# Patient Record
Sex: FEMALE | Race: WHITE | NOT HISPANIC OR LATINO
[De-identification: names, ages, dates, MRNs, and addresses within clinical notes are randomized per-mention and may not be internally consistent; named-entity substitution may affect disease eponyms.]

---

## 2017-01-04 ENCOUNTER — APPOINTMENT (OUTPATIENT)
Dept: COLORECTAL SURGERY | Facility: CLINIC | Age: 62
End: 2017-01-04

## 2017-01-09 ENCOUNTER — OTHER (OUTPATIENT)
Age: 62
End: 2017-01-09

## 2017-01-11 ENCOUNTER — APPOINTMENT (OUTPATIENT)
Dept: COLORECTAL SURGERY | Facility: CLINIC | Age: 62
End: 2017-01-11

## 2017-01-11 VITALS
DIASTOLIC BLOOD PRESSURE: 81 MMHG | SYSTOLIC BLOOD PRESSURE: 123 MMHG | BODY MASS INDEX: 25.52 KG/M2 | HEIGHT: 63 IN | WEIGHT: 144 LBS | TEMPERATURE: 97.5 F | HEART RATE: 84 BPM | RESPIRATION RATE: 14 BRPM

## 2017-01-24 ENCOUNTER — APPOINTMENT (OUTPATIENT)
Dept: FAMILY MEDICINE | Facility: CLINIC | Age: 62
End: 2017-01-24

## 2017-02-08 ENCOUNTER — APPOINTMENT (OUTPATIENT)
Dept: COLORECTAL SURGERY | Facility: CLINIC | Age: 62
End: 2017-02-08

## 2017-03-10 ENCOUNTER — APPOINTMENT (OUTPATIENT)
Dept: FAMILY MEDICINE | Facility: CLINIC | Age: 62
End: 2017-03-10

## 2017-03-10 VITALS
WEIGHT: 159 LBS | SYSTOLIC BLOOD PRESSURE: 125 MMHG | BODY MASS INDEX: 28.17 KG/M2 | HEART RATE: 70 BPM | DIASTOLIC BLOOD PRESSURE: 82 MMHG | HEIGHT: 63 IN

## 2017-03-10 DIAGNOSIS — K64.8 RESIDUAL HEMORRHOIDAL SKIN TAGS: ICD-10-CM

## 2017-03-10 DIAGNOSIS — K64.4 RESIDUAL HEMORRHOIDAL SKIN TAGS: ICD-10-CM

## 2017-06-20 ENCOUNTER — APPOINTMENT (OUTPATIENT)
Dept: FAMILY MEDICINE | Facility: CLINIC | Age: 62
End: 2017-06-20

## 2017-06-20 VITALS
WEIGHT: 149 LBS | BODY MASS INDEX: 26.4 KG/M2 | HEART RATE: 65 BPM | DIASTOLIC BLOOD PRESSURE: 70 MMHG | SYSTOLIC BLOOD PRESSURE: 120 MMHG | OXYGEN SATURATION: 98 % | HEIGHT: 63 IN

## 2017-06-20 DIAGNOSIS — H92.02 OTALGIA, LEFT EAR: ICD-10-CM

## 2017-06-22 LAB — BACTERIA UR CULT: NORMAL

## 2017-07-26 LAB
BILIRUB UR QL STRIP: NEGATIVE
GLUCOSE UR-MCNC: NEGATIVE
HCG UR QL: 0.2 EU/DL
HGB UR QL STRIP.AUTO: NORMAL
KETONES UR-MCNC: NEGATIVE
LEUKOCYTE ESTERASE UR QL STRIP: NORMAL
NITRITE UR QL STRIP: NEGATIVE
PH UR STRIP: 7
PROT UR STRIP-MCNC: NEGATIVE
SP GR UR STRIP: 1.02

## 2017-08-25 ENCOUNTER — OTHER (OUTPATIENT)
Age: 62
End: 2017-08-25

## 2017-09-12 ENCOUNTER — APPOINTMENT (OUTPATIENT)
Dept: FAMILY MEDICINE | Facility: CLINIC | Age: 62
End: 2017-09-12
Payer: MEDICARE

## 2017-09-12 VITALS
TEMPERATURE: 98.2 F | OXYGEN SATURATION: 99 % | WEIGHT: 145 LBS | HEIGHT: 63 IN | HEART RATE: 58 BPM | SYSTOLIC BLOOD PRESSURE: 120 MMHG | DIASTOLIC BLOOD PRESSURE: 70 MMHG | BODY MASS INDEX: 25.69 KG/M2

## 2017-09-12 PROCEDURE — 99214 OFFICE O/P EST MOD 30 MIN: CPT

## 2017-09-12 RX ORDER — CIPROFLOXACIN HYDROCHLORIDE 500 MG/1
500 TABLET, FILM COATED ORAL
Qty: 20 | Refills: 0 | Status: DISCONTINUED | COMMUNITY
Start: 2017-06-20 | End: 2017-09-12

## 2017-09-13 ENCOUNTER — MEDICATION RENEWAL (OUTPATIENT)
Age: 62
End: 2017-09-13

## 2017-09-15 ENCOUNTER — MEDICATION RENEWAL (OUTPATIENT)
Age: 62
End: 2017-09-15

## 2017-09-27 ENCOUNTER — MEDICATION RENEWAL (OUTPATIENT)
Age: 62
End: 2017-09-27

## 2017-11-06 ENCOUNTER — MEDICATION RENEWAL (OUTPATIENT)
Age: 62
End: 2017-11-06

## 2017-11-07 ENCOUNTER — RESULT REVIEW (OUTPATIENT)
Age: 62
End: 2017-11-07

## 2017-11-08 ENCOUNTER — OTHER (OUTPATIENT)
Age: 62
End: 2017-11-08

## 2017-11-15 ENCOUNTER — MEDICATION RENEWAL (OUTPATIENT)
Age: 62
End: 2017-11-15

## 2017-11-28 ENCOUNTER — APPOINTMENT (OUTPATIENT)
Dept: FAMILY MEDICINE | Facility: CLINIC | Age: 62
End: 2017-11-28
Payer: MEDICARE

## 2017-11-28 VITALS
SYSTOLIC BLOOD PRESSURE: 114 MMHG | WEIGHT: 146 LBS | HEIGHT: 63 IN | TEMPERATURE: 97.8 F | BODY MASS INDEX: 25.87 KG/M2 | OXYGEN SATURATION: 98 % | DIASTOLIC BLOOD PRESSURE: 70 MMHG | HEART RATE: 61 BPM

## 2017-11-28 PROCEDURE — 99214 OFFICE O/P EST MOD 30 MIN: CPT

## 2017-11-28 RX ORDER — AZITHROMYCIN 250 MG/1
250 TABLET, FILM COATED ORAL
Qty: 1 | Refills: 0 | Status: DISCONTINUED | COMMUNITY
Start: 2017-09-15 | End: 2017-11-28

## 2017-12-05 ENCOUNTER — MEDICATION RENEWAL (OUTPATIENT)
Age: 62
End: 2017-12-05

## 2018-01-19 ENCOUNTER — APPOINTMENT (OUTPATIENT)
Dept: FAMILY MEDICINE | Facility: CLINIC | Age: 63
End: 2018-01-19
Payer: MEDICARE

## 2018-01-19 VITALS
DIASTOLIC BLOOD PRESSURE: 70 MMHG | HEART RATE: 80 BPM | HEIGHT: 63 IN | WEIGHT: 148 LBS | BODY MASS INDEX: 26.22 KG/M2 | OXYGEN SATURATION: 98 % | SYSTOLIC BLOOD PRESSURE: 118 MMHG

## 2018-01-19 PROCEDURE — 99214 OFFICE O/P EST MOD 30 MIN: CPT | Mod: 25

## 2018-01-19 PROCEDURE — 90688 IIV4 VACCINE SPLT 0.5 ML IM: CPT

## 2018-01-19 PROCEDURE — G0008: CPT

## 2018-01-19 RX ORDER — AZITHROMYCIN 250 MG/1
250 TABLET, FILM COATED ORAL
Qty: 1 | Refills: 0 | Status: DISCONTINUED | COMMUNITY
Start: 2017-11-28 | End: 2018-01-19

## 2018-02-14 ENCOUNTER — TRANSCRIPTION ENCOUNTER (OUTPATIENT)
Age: 63
End: 2018-02-14

## 2018-04-10 ENCOUNTER — APPOINTMENT (OUTPATIENT)
Dept: FAMILY MEDICINE | Facility: CLINIC | Age: 63
End: 2018-04-10

## 2018-04-30 ENCOUNTER — MEDICATION RENEWAL (OUTPATIENT)
Age: 63
End: 2018-04-30

## 2018-05-15 ENCOUNTER — APPOINTMENT (OUTPATIENT)
Dept: FAMILY MEDICINE | Facility: CLINIC | Age: 63
End: 2018-05-15

## 2018-06-12 ENCOUNTER — APPOINTMENT (OUTPATIENT)
Dept: FAMILY MEDICINE | Facility: CLINIC | Age: 63
End: 2018-06-12
Payer: MEDICARE

## 2018-06-12 VITALS
HEART RATE: 72 BPM | OXYGEN SATURATION: 98 % | DIASTOLIC BLOOD PRESSURE: 70 MMHG | WEIGHT: 146 LBS | SYSTOLIC BLOOD PRESSURE: 116 MMHG | BODY MASS INDEX: 25.87 KG/M2 | HEIGHT: 63 IN

## 2018-06-12 DIAGNOSIS — Z86.69 PERSONAL HISTORY OF OTHER DISEASES OF THE NERVOUS SYSTEM AND SENSE ORGANS: ICD-10-CM

## 2018-06-12 PROCEDURE — 99214 OFFICE O/P EST MOD 30 MIN: CPT

## 2018-06-12 RX ORDER — ALBUTEROL SULFATE 90 UG/1
108 (90 BASE) AEROSOL, METERED RESPIRATORY (INHALATION)
Qty: 1 | Refills: 1 | Status: COMPLETED | COMMUNITY
Start: 2018-01-19 | End: 2018-06-12

## 2018-06-12 RX ORDER — MESALAMINE 1000 MG/1
1000 SUPPOSITORY RECTAL
Qty: 30 | Refills: 3 | Status: COMPLETED | COMMUNITY
Start: 2017-03-10 | End: 2018-06-12

## 2018-06-12 NOTE — PHYSICAL EXAM
[No Acute Distress] : no acute distress [Well Nourished] : well nourished [Well Developed] : well developed [Well-Appearing] : well-appearing [EOMI] : extraocular movements intact [Normal Oropharynx] : the oropharynx was normal [No JVD] : no jugular venous distention [Supple] : supple [No Lymphadenopathy] : no lymphadenopathy [No Respiratory Distress] : no respiratory distress  [Clear to Auscultation] : lungs were clear to auscultation bilaterally [No Accessory Muscle Use] : no accessory muscle use [Normal Rate] : normal rate  [Regular Rhythm] : with a regular rhythm [Normal S1, S2] : normal S1 and S2 [No Edema] : there was no peripheral edema [Soft] : abdomen soft [Non Tender] : non-tender [Non-distended] : non-distended [No HSM] : no HSM [No CVA Tenderness] : no CVA  tenderness [Grossly Normal Strength/Tone] : grossly normal strength/tone [No Rash] : no rash [Normal Gait] : normal gait [Coordination Grossly Intact] : coordination grossly intact [No Focal Deficits] : no focal deficits [Normal Affect] : the affect was normal [Normal Mood] : the mood was normal [Normal Insight/Judgement] : insight and judgment were intact

## 2018-06-12 NOTE — HISTORY OF PRESENT ILLNESS
[FreeTextEntry1] : follow up on Vit D def/Hypothyroidism  [de-identified] : 61 yo female presents to office presents to office for 3M checkup on Vit D def/Asthma/Migraine H/A  and Hypothyroidism.   pt feels well today no new complaints.  \par \par \par no CP/SOB at rest   +SOB c > 2 flights of stairs no dizziness palpitations no N/V/D +BM qd NL no bloody/black stools \par no urinary complaints \par \par

## 2018-06-29 ENCOUNTER — APPOINTMENT (OUTPATIENT)
Dept: FAMILY MEDICINE | Facility: CLINIC | Age: 63
End: 2018-06-29
Payer: MEDICARE

## 2018-06-29 VITALS
BODY MASS INDEX: 26.5 KG/M2 | TEMPERATURE: 98.1 F | DIASTOLIC BLOOD PRESSURE: 74 MMHG | WEIGHT: 144 LBS | SYSTOLIC BLOOD PRESSURE: 114 MMHG | HEIGHT: 62 IN | OXYGEN SATURATION: 98 % | HEART RATE: 91 BPM

## 2018-06-29 DIAGNOSIS — J18.0 BRONCHOPNEUMONIA, UNSPECIFIED ORGANISM: ICD-10-CM

## 2018-06-29 PROCEDURE — 99214 OFFICE O/P EST MOD 30 MIN: CPT

## 2018-06-29 RX ORDER — PREDNISONE 50 MG/1
50 TABLET ORAL
Qty: 5 | Refills: 0 | Status: COMPLETED | COMMUNITY
Start: 2018-02-14

## 2018-06-29 NOTE — REVIEW OF SYSTEMS
[Fatigue] : fatigue [Negative] : Heme/Lymph [FreeTextEntry2] : see HPI  [FreeTextEntry4] : see HPI  [FreeTextEntry6] : see HPI

## 2018-06-29 NOTE — PHYSICAL EXAM
[No Acute Distress] : no acute distress [Well Nourished] : well nourished [Well Developed] : well developed [EOMI] : extraocular movements intact [No JVD] : no jugular venous distention [Supple] : supple [No Lymphadenopathy] : no lymphadenopathy [Normal Rate] : normal rate  [Regular Rhythm] : with a regular rhythm [Normal S1, S2] : normal S1 and S2 [No Edema] : there was no peripheral edema [No CVA Tenderness] : no CVA  tenderness [Grossly Normal Strength/Tone] : grossly normal strength/tone [No Rash] : no rash [Normal Gait] : normal gait [Coordination Grossly Intact] : coordination grossly intact [No Focal Deficits] : no focal deficits [Normal Affect] : the affect was normal [Normal Mood] : the mood was normal [Normal Insight/Judgement] : insight and judgment were intact [de-identified] : +PND post pharynx  [de-identified] : +wheezes/rales RUL/RML

## 2018-06-29 NOTE — ASSESSMENT
[FreeTextEntry1] : pt states unable to tolerate oral corticosteroids secondary to trigger of migraine  \par \par cont Singulair 10mg hs \par Stressed need for q6-8h abuterol neb tx's \par \par see med orders \par \par see STAT radiology orders \par \par \par

## 2018-06-29 NOTE — HISTORY OF PRESENT ILLNESS
[FreeTextEntry8] : cough/congestion/body aches x2wks\par \par \par 2 week hx of loose cough, non-productive, +nasal congestion c clear rhinorrhea  no fever no chills +sweats +body aches +fatigue \par no sore throat no ear pain B/L \par \par self medicated c Cipro 500mg QD x 6 days  (pt states felt an improvement at that time approx "60%") that she completed 1 week ago \par \par

## 2018-07-13 ENCOUNTER — APPOINTMENT (OUTPATIENT)
Dept: FAMILY MEDICINE | Facility: CLINIC | Age: 63
End: 2018-07-13

## 2018-07-24 ENCOUNTER — MEDICATION RENEWAL (OUTPATIENT)
Age: 63
End: 2018-07-24

## 2018-08-07 ENCOUNTER — MEDICATION RENEWAL (OUTPATIENT)
Age: 63
End: 2018-08-07

## 2018-08-07 ENCOUNTER — APPOINTMENT (OUTPATIENT)
Dept: FAMILY MEDICINE | Facility: CLINIC | Age: 63
End: 2018-08-07
Payer: MEDICARE

## 2018-08-07 VITALS
SYSTOLIC BLOOD PRESSURE: 116 MMHG | BODY MASS INDEX: 26.13 KG/M2 | OXYGEN SATURATION: 98 % | WEIGHT: 142 LBS | DIASTOLIC BLOOD PRESSURE: 78 MMHG | HEIGHT: 62 IN | HEART RATE: 84 BPM

## 2018-08-07 DIAGNOSIS — T75.3XXA MOTION SICKNESS, INITIAL ENCOUNTER: ICD-10-CM

## 2018-08-07 PROCEDURE — 99214 OFFICE O/P EST MOD 30 MIN: CPT

## 2018-08-07 RX ORDER — CIPROFLOXACIN HYDROCHLORIDE 500 MG/1
500 TABLET, FILM COATED ORAL
Qty: 20 | Refills: 0 | Status: COMPLETED | COMMUNITY
Start: 2018-06-29 | End: 2018-08-07

## 2018-08-07 NOTE — PHYSICAL EXAM
[No Acute Distress] : no acute distress [Well Nourished] : well nourished [Well Developed] : well developed [EOMI] : extraocular movements intact [Normal Outer Ear/Nose] : the outer ears and nose were normal in appearance [Normal Oropharynx] : the oropharynx was normal [No JVD] : no jugular venous distention [Supple] : supple [No Lymphadenopathy] : no lymphadenopathy [No Respiratory Distress] : no respiratory distress  [Clear to Auscultation] : lungs were clear to auscultation bilaterally [No Accessory Muscle Use] : no accessory muscle use [Normal Rate] : normal rate  [Regular Rhythm] : with a regular rhythm [Normal S1, S2] : normal S1 and S2 [No Edema] : there was no peripheral edema [Soft] : abdomen soft [Non Tender] : non-tender [Non-distended] : non-distended [No HSM] : no HSM [Normal Posterior Cervical Nodes] : no posterior cervical lymphadenopathy [Normal Anterior Cervical Nodes] : no anterior cervical lymphadenopathy [No CVA Tenderness] : no CVA  tenderness [Grossly Normal Strength/Tone] : grossly normal strength/tone [No Rash] : no rash [Normal Gait] : normal gait [Coordination Grossly Intact] : coordination grossly intact [No Focal Deficits] : no focal deficits [Normal Affect] : the affect was normal [Normal Mood] : the mood was normal [Normal Insight/Judgement] : insight and judgment were intact

## 2018-08-07 NOTE — HISTORY OF PRESENT ILLNESS
[FreeTextEntry1] : Patient here to discuss medication  [de-identified] : 63 yo female scheduled to go on 7 day cruise on 8/18/18.  pt states has history of motion sickness, requests Sopalamine patch. \par pt feels well today no current complaints

## 2018-10-16 ENCOUNTER — APPOINTMENT (OUTPATIENT)
Dept: FAMILY MEDICINE | Facility: CLINIC | Age: 63
End: 2018-10-16

## 2018-11-02 ENCOUNTER — APPOINTMENT (OUTPATIENT)
Dept: FAMILY MEDICINE | Facility: CLINIC | Age: 63
End: 2018-11-02
Payer: MEDICARE

## 2018-11-02 VITALS
HEIGHT: 62 IN | OXYGEN SATURATION: 99 % | SYSTOLIC BLOOD PRESSURE: 118 MMHG | DIASTOLIC BLOOD PRESSURE: 72 MMHG | HEART RATE: 77 BPM | TEMPERATURE: 98.1 F | WEIGHT: 146 LBS | BODY MASS INDEX: 26.87 KG/M2

## 2018-11-02 DIAGNOSIS — R42 DIZZINESS AND GIDDINESS: ICD-10-CM

## 2018-11-02 DIAGNOSIS — H53.8 OTHER VISUAL DISTURBANCES: ICD-10-CM

## 2018-11-02 PROCEDURE — 90686 IIV4 VACC NO PRSV 0.5 ML IM: CPT

## 2018-11-02 PROCEDURE — G0008: CPT

## 2018-11-02 PROCEDURE — 99214 OFFICE O/P EST MOD 30 MIN: CPT | Mod: 25

## 2018-11-02 RX ORDER — CIPROFLOXACIN HYDROCHLORIDE 500 MG/1
500 TABLET, FILM COATED ORAL TWICE DAILY
Qty: 20 | Refills: 0 | Status: DISCONTINUED | COMMUNITY
Start: 2018-08-07 | End: 2018-11-02

## 2018-11-02 NOTE — PHYSICAL EXAM
[No Acute Distress] : no acute distress [Well Nourished] : well nourished [Well Developed] : well developed [Well-Appearing] : well-appearing [EOMI] : extraocular movements intact [Normal Outer Ear/Nose] : the outer ears and nose were normal in appearance [Supple] : supple [No Respiratory Distress] : no respiratory distress  [Clear to Auscultation] : lungs were clear to auscultation bilaterally [No Accessory Muscle Use] : no accessory muscle use [Normal Rate] : normal rate  [Regular Rhythm] : with a regular rhythm [Normal S1, S2] : normal S1 and S2 [No Edema] : there was no peripheral edema [Non Tender] : non-tender [Normal Posterior Cervical Nodes] : no posterior cervical lymphadenopathy [Normal Anterior Cervical Nodes] : no anterior cervical lymphadenopathy [No CVA Tenderness] : no CVA  tenderness [Grossly Normal Strength/Tone] : grossly normal strength/tone [No Rash] : no rash [Normal Gait] : normal gait [Coordination Grossly Intact] : coordination grossly intact [No Focal Deficits] : no focal deficits [Normal Affect] : the affect was normal [Normal Mood] : the mood was normal [Normal Insight/Judgement] : insight and judgment were intact

## 2018-11-02 NOTE — HISTORY OF PRESENT ILLNESS
[FreeTextEntry1] : follow up on anxiety/hypothyroidism/vit d def [de-identified] : 62 yo female presents to office for f/u on KATHERINE/Hypothyroidism/Vit D def.    pt reports CP c increased anxiety only,   reports increased migraine frequency "atleast 1x/week"  +blurred vision associated c H/A only +dizziness associated c H/A only,  increased frequency of vertigo like symptoms \par \par no change in bowel habits no change in urinary habits

## 2018-11-07 ENCOUNTER — MEDICATION RENEWAL (OUTPATIENT)
Age: 63
End: 2018-11-07

## 2018-11-16 ENCOUNTER — TRANSCRIPTION ENCOUNTER (OUTPATIENT)
Age: 63
End: 2018-11-16

## 2019-01-31 ENCOUNTER — RESULT CHARGE (OUTPATIENT)
Age: 64
End: 2019-01-31

## 2019-02-01 ENCOUNTER — NON-APPOINTMENT (OUTPATIENT)
Age: 64
End: 2019-02-01

## 2019-02-01 ENCOUNTER — APPOINTMENT (OUTPATIENT)
Dept: FAMILY MEDICINE | Facility: CLINIC | Age: 64
End: 2019-02-01
Payer: MEDICARE

## 2019-02-01 VITALS
WEIGHT: 148 LBS | HEIGHT: 62 IN | DIASTOLIC BLOOD PRESSURE: 80 MMHG | OXYGEN SATURATION: 80 % | HEART RATE: 78 BPM | BODY MASS INDEX: 27.23 KG/M2 | SYSTOLIC BLOOD PRESSURE: 122 MMHG

## 2019-02-01 DIAGNOSIS — Z00.00 ENCOUNTER FOR GENERAL ADULT MEDICAL EXAMINATION W/OUT ABNORMAL FINDINGS: ICD-10-CM

## 2019-02-01 DIAGNOSIS — K63.5 POLYP OF COLON: ICD-10-CM

## 2019-02-01 PROCEDURE — G0402 INITIAL PREVENTIVE EXAM: CPT

## 2019-02-01 PROCEDURE — G0403: CPT

## 2019-02-01 RX ORDER — SCOPOLAMINE 1.5 MG/1
1 PATCH, EXTENDED RELEASE TRANSDERMAL
Qty: 1 | Refills: 3 | Status: COMPLETED | COMMUNITY
Start: 2018-08-07 | End: 2019-02-01

## 2019-02-01 NOTE — HEALTH RISK ASSESSMENT
[Patient reported colonoscopy was abnormal] : Patient reported colonoscopy was abnormal [With Family] : lives with family [# of Members in Household ___] :  household currently consist of [unfilled] member(s) [On disability] : on disability [High School] : high school [] :  [# Of Children ___] : has [unfilled] children [Feels Safe at Home] : Feels safe at home [Fully functional (bathing, dressing, toileting, transferring, walking, feeding)] : Fully functional (bathing, dressing, toileting, transferring, walking, feeding) [Fully functional (using the telephone, shopping, preparing meals, housekeeping, doing laundry, using] : Fully functional and needs no help or supervision to perform IADLs (using the telephone, shopping, preparing meals, housekeeping, doing laundry, using transportation, managing medications and managing finances) [Reports changes in hearing] : Reports no changes in hearing [Reports changes in vision] : Reports no changes in vision [Reports changes in dental health] : Reports no changes in dental health [Smoke Detector] : smoke detector [Carbon Monoxide Detector] : carbon monoxide detector [Guns at Home] : no guns at home [Seat Belt] :  uses seat belt [Sunscreen] : uses sunscreen [PapSmearDate] : 01/16  [PapSmearComments] : [facility] [place]  [BoneDensityDate] : N/A  [BoneDensityComments] : pt c hx of spinal fusion unable to perforn DEXA scan  [ColonoscopyDate] : 01/16  [ColonoscopyComments] : +polyps  [Discussed at today's visit] : Advance Directives Discussed at today's visit [FreeTextEntry4] : pt states will discuss with family in near future

## 2019-02-01 NOTE — HISTORY OF PRESENT ILLNESS
[FreeTextEntry1] : Patient here for Annual Medicare Exam [de-identified] : Welcome to  Medicare Wellness Exam \par as above  feels well

## 2019-02-01 NOTE — ASSESSMENT
[FreeTextEntry1] : see med orders \par \par see lab orders \par \par EKG performed: SR at 60 bpm, normal axis, no ST/T changes  \par \par

## 2019-02-01 NOTE — PHYSICAL EXAM
[No Acute Distress] : no acute distress [Well Nourished] : well nourished [Well Developed] : well developed [Well-Appearing] : well-appearing [EOMI] : extraocular movements intact [Normal Outer Ear/Nose] : the outer ears and nose were normal in appearance [No JVD] : no jugular venous distention [Supple] : supple [No Lymphadenopathy] : no lymphadenopathy [No Respiratory Distress] : no respiratory distress  [Clear to Auscultation] : lungs were clear to auscultation bilaterally [No Accessory Muscle Use] : no accessory muscle use [Normal Rate] : normal rate  [Regular Rhythm] : with a regular rhythm [No Carotid Bruits] : no carotid bruits [No Abdominal Bruit] : a ~M bruit was not heard ~T in the abdomen [No Edema] : there was no peripheral edema [No Palpable Aorta] : no palpable aorta

## 2019-02-01 NOTE — HEALTH RISK ASSESSMENT
[Patient reported colonoscopy was abnormal] : Patient reported colonoscopy was abnormal [With Family] : lives with family [# of Members in Household ___] :  household currently consist of [unfilled] member(s) [On disability] : on disability [High School] : high school [] :  [# Of Children ___] : has [unfilled] children [Feels Safe at Home] : Feels safe at home [Fully functional (bathing, dressing, toileting, transferring, walking, feeding)] : Fully functional (bathing, dressing, toileting, transferring, walking, feeding) [Fully functional (using the telephone, shopping, preparing meals, housekeeping, doing laundry, using] : Fully functional and needs no help or supervision to perform IADLs (using the telephone, shopping, preparing meals, housekeeping, doing laundry, using transportation, managing medications and managing finances) [Reports changes in hearing] : Reports no changes in hearing [Reports changes in vision] : Reports no changes in vision [Reports changes in dental health] : Reports no changes in dental health [Smoke Detector] : smoke detector [Carbon Monoxide Detector] : carbon monoxide detector [Guns at Home] : no guns at home [Seat Belt] :  uses seat belt [Sunscreen] : uses sunscreen [PapSmearDate] : 01/16  [PapSmearComments] : [facility] [place]  [BoneDensityDate] : N/A  [BoneDensityComments] : pt c hx of spinal fusion unable to perforn DEXA scan  [ColonoscopyDate] : 01/16  [ColonoscopyComments] : +polyps  [Discussed at today's visit] : Advance Directives Discussed at today's visit [FreeTextEntry4] : pt states will discuss with family in near future

## 2019-02-01 NOTE — HISTORY OF PRESENT ILLNESS
[FreeTextEntry1] : Patient here for Annual Medicare Exam [de-identified] : Welcome to  Medicare Wellness Exam \par as above  feels well

## 2019-02-18 ENCOUNTER — OTHER (OUTPATIENT)
Age: 64
End: 2019-02-18

## 2019-03-15 ENCOUNTER — OTHER (OUTPATIENT)
Age: 64
End: 2019-03-15

## 2019-04-03 ENCOUNTER — APPOINTMENT (OUTPATIENT)
Dept: FAMILY MEDICINE | Facility: CLINIC | Age: 64
End: 2019-04-03
Payer: MEDICARE

## 2019-04-03 VITALS
HEIGHT: 62 IN | OXYGEN SATURATION: 99 % | SYSTOLIC BLOOD PRESSURE: 110 MMHG | DIASTOLIC BLOOD PRESSURE: 70 MMHG | TEMPERATURE: 97.9 F | WEIGHT: 146 LBS | HEART RATE: 71 BPM | BODY MASS INDEX: 26.87 KG/M2

## 2019-04-03 DIAGNOSIS — Z87.09 PERSONAL HISTORY OF OTHER DISEASES OF THE RESPIRATORY SYSTEM: ICD-10-CM

## 2019-04-03 PROCEDURE — 99214 OFFICE O/P EST MOD 30 MIN: CPT

## 2019-04-03 NOTE — ASSESSMENT
[FreeTextEntry1] : pt states unable to tolerate PO steroids,  strongly advised pt to take abx as rx'ed, cont Singulair 10mg hs, \par \par increase Symbicort 160/4.5 from 2 puffs QD to 2 puffs BID, and start Albuterol Neb Tx's 3x/day effective immediately\par \par see med orders \par \par

## 2019-04-03 NOTE — PHYSICAL EXAM
[No Acute Distress] : no acute distress [Well Nourished] : well nourished [Well Developed] : well developed [EOMI] : extraocular movements intact [No JVD] : no jugular venous distention [Normal Rate] : normal rate  [Regular Rhythm] : with a regular rhythm [Normal S1, S2] : normal S1 and S2 [No Edema] : there was no peripheral edema [Non-distended] : non-distended [No CVA Tenderness] : no CVA  tenderness [Grossly Normal Strength/Tone] : grossly normal strength/tone [No Rash] : no rash [Normal Gait] : normal gait [Coordination Grossly Intact] : coordination grossly intact [No Focal Deficits] : no focal deficits [Normal Affect] : the affect was normal [Normal Mood] : the mood was normal [Normal Insight/Judgement] : insight and judgment were intact [de-identified] : +PND post pharynx, minimally dull TMs B  [de-identified] : coarse BS  c diffuse wheezes/rales B/L

## 2019-04-03 NOTE — HISTORY OF PRESENT ILLNESS
[FreeTextEntry8] : patient c/o cold/ cough and Left ear pain \par \par 64 yo female c 1 week hx of loose cough, +sore throat, +L ear pain, +sinus/facial head pressure. \par no N/V/D no abd pain +sweats  +nasal congestion c clear rhinorrhea \par \par reports some relief c OTC Advil Cold & Sinus and Tessalon Perles

## 2019-04-04 ENCOUNTER — RX RENEWAL (OUTPATIENT)
Age: 64
End: 2019-04-04

## 2019-04-05 ENCOUNTER — OTHER (OUTPATIENT)
Age: 64
End: 2019-04-05

## 2019-04-18 ENCOUNTER — MEDICATION RENEWAL (OUTPATIENT)
Age: 64
End: 2019-04-18

## 2019-04-24 ENCOUNTER — RX RENEWAL (OUTPATIENT)
Age: 64
End: 2019-04-24

## 2019-07-09 ENCOUNTER — TRANSCRIPTION ENCOUNTER (OUTPATIENT)
Age: 64
End: 2019-07-09

## 2019-07-12 ENCOUNTER — NON-APPOINTMENT (OUTPATIENT)
Age: 64
End: 2019-07-12

## 2019-07-12 ENCOUNTER — MEDICATION RENEWAL (OUTPATIENT)
Age: 64
End: 2019-07-12

## 2019-07-12 ENCOUNTER — APPOINTMENT (OUTPATIENT)
Dept: FAMILY MEDICINE | Facility: CLINIC | Age: 64
End: 2019-07-12
Payer: MEDICARE

## 2019-07-12 VITALS
WEIGHT: 148 LBS | SYSTOLIC BLOOD PRESSURE: 112 MMHG | HEART RATE: 70 BPM | TEMPERATURE: 98.1 F | HEIGHT: 62 IN | DIASTOLIC BLOOD PRESSURE: 70 MMHG | BODY MASS INDEX: 27.23 KG/M2 | OXYGEN SATURATION: 97 %

## 2019-07-12 PROCEDURE — 93000 ELECTROCARDIOGRAM COMPLETE: CPT

## 2019-07-12 PROCEDURE — 99214 OFFICE O/P EST MOD 30 MIN: CPT | Mod: 25

## 2019-07-12 RX ORDER — AZITHROMYCIN 250 MG/1
250 TABLET, FILM COATED ORAL
Qty: 1 | Refills: 0 | Status: COMPLETED | COMMUNITY
Start: 2019-04-05 | End: 2019-07-12

## 2019-07-12 NOTE — HISTORY OF PRESENT ILLNESS
[FreeTextEntry1] : follow up on anxiety/hypothyroidism/vit d def, needs BW script [de-identified] : 64 yo female for f/u on KATHERINE/hypothyroidism/Vit D def/Asthma\par as above,  c/o PND,   Denies CP/SOB c walking from  parking lot to office entrance   +ve SOB c stair-climbing x 3 weeks,  no dizziness \par no palpitations \par \par no N/V/D +BM qd NL no bloody/black stools no urinary complaints

## 2019-07-12 NOTE — PHYSICAL EXAM
[No Acute Distress] : no acute distress [Well Nourished] : well nourished [Well-Appearing] : well-appearing [Well Developed] : well developed [No JVD] : no jugular venous distention [Normal Outer Ear/Nose] : the outer ears and nose were normal in appearance [EOMI] : extraocular movements intact [No Lymphadenopathy] : no lymphadenopathy [Supple] : supple [No Respiratory Distress] : no respiratory distress  [No Accessory Muscle Use] : no accessory muscle use [Regular Rhythm] : with a regular rhythm [Normal Rate] : normal rate  [Clear to Auscultation] : lungs were clear to auscultation bilaterally [Normal S1, S2] : normal S1 and S2 [No Carotid Bruits] : no carotid bruits [Non-distended] : non-distended [No CVA Tenderness] : no CVA  tenderness [No Edema] : there was no peripheral edema [No Rash] : no rash [Grossly Normal Strength/Tone] : grossly normal strength/tone [Coordination Grossly Intact] : coordination grossly intact [No Focal Deficits] : no focal deficits [Normal Gait] : normal gait [Normal Insight/Judgement] : insight and judgment were intact [Normal Mood] : the mood was normal [Normal Affect] : the affect was normal [de-identified] : +murmur

## 2019-07-12 NOTE — ASSESSMENT
[FreeTextEntry1] : Cardio consult c Dr. Malhotra strongly advised \par \par see lab orders \par \par see med orders \par \par EKG performed:  \par

## 2019-07-29 NOTE — REVIEW OF SYSTEMS
89F, PMHx hypothyroidism, hyperlipidemia, Osteoporosis, HTN, macular degeneration and glaucoma of the left eye presented to ED s/p fall. She stated she tripped and fell on the street, falling onto her L side. - HT, -LOC,  + ASA. She was c/o L arm, L hip/leg pain. She was found to have left superior and inferior pubic ramus fracture, left lateral epicondyle fracture, focal hematoma along the left inner pelvic sidewall and Thickening of the left urinary bladder wall. CT cysto showed  No extraluminal contrast to suggest bladder rupture.  Hemoglobin has been stable. Orhtopedics saw her and recomended: ace wrap to elbow, WBAT both UE, WBAT to pelvic fx with walker, follow as out patient. During hospital stay patient has had elevated creatinine to 1.8 which was treatex 89F, PMHx hypothyroidism, hyperlipidemia, Osteoporosis, HTN, macular degeneration and glaucoma of the left eye presented to ED s/p fall. She stated she tripped and fell on the street, falling onto her L side. - HT, -LOC,  + ASA. She was c/o L arm, L hip/leg pain. She was found to have left superior and inferior pubic ramus fracture, left lateral epicondyle fracture, focal hematoma along the left inner pelvic sidewall and Thickening of the left urinary bladder wall. CT cysto showed  No extraluminal contrast to suggest bladder rupture.  Hemoglobin has been stable. Orthopedics saw her and recommended: ace wrap to elbow, WBAT both UE, WBAT to pelvic fx with walker, follow as out patient. During hospital stay patient has had elevated creatinine to 1.8 which was treated    Case discussed with Dr. oHgue and trauma team. patient cleared from trauma and needs transfer to medicine for further work up of elevated creatinine  Spoke with attending Dr. Cooper for transfer of patient to her/his service. Aware and accepts  Patient signed out to medicine resident Dr. Jung covering 4B  F/u up on creatine, outpatient follow with orthopedics 89F, PMHx hypothyroidism, hyperlipidemia, Osteoporosis, HTN, macular degeneration and glaucoma of the left eye presented to ED s/p fall. She stated she tripped and fell on the street, falling onto her L side. - HT, -LOC,  + ASA. She was c/o L arm, L hip/leg pain. She was found to have left superior and inferior pubic ramus fracture, left lateral epicondyle fracture, focal hematoma along the left inner pelvic sidewall and Thickening of the left urinary bladder wall. CT cysto showed  No extraluminal contrast to suggest bladder rupture.  Hemoglobin has been stable. Orthopedics saw her and recommended: ace wrap to elbow, WBAT both UE, WBAT to pelvic fx with walker, follow as out patient. During hospital stay patient has had elevated creatinine to 1.8 which was treated with IVF but and went down to 1.4, and then went up again to 1.6    Case discussed with Dr. Hogue and trauma team. patient cleared from trauma and needs transfer to medicine for further work up of elevated creatinine  Spoke with attending Dr. Cooper for transfer of patient to her/his service. Aware and accepts  Patient signed out to medicine resident Dr. Jung covering 4B  F/u up on creatinine, outpatient follow with orthopedics [Headache] : headache [Dizziness] : dizziness [Negative] : Heme/Lymph [de-identified] : see HPI

## 2019-08-06 ENCOUNTER — RX RENEWAL (OUTPATIENT)
Age: 64
End: 2019-08-06

## 2019-09-04 ENCOUNTER — APPOINTMENT (OUTPATIENT)
Dept: COLORECTAL SURGERY | Facility: CLINIC | Age: 64
End: 2019-09-04
Payer: MEDICARE

## 2019-09-04 DIAGNOSIS — K64.5 PERIANAL VENOUS THROMBOSIS: ICD-10-CM

## 2019-09-04 PROCEDURE — 99214 OFFICE O/P EST MOD 30 MIN: CPT

## 2019-09-04 NOTE — HISTORY OF PRESENT ILLNESS
[FreeTextEntry1] : 63-year-old female who presents for followup. She has a history of thrombosed external hemorrhoid managed conservatively and this developed following her colonoscopy. She has a history of colon polyps on colonoscopy in 2016 and is due for another one but scared of pursuing this. She is on a daily stool softener and uses witch hazel which helps. No rectal bleeding. She has some lower abdominal pain which she feels is more in the abdominal wall and improves with abdominal wall support. She has upcoming procedure for kidney stones.\par

## 2019-09-04 NOTE — PHYSICAL EXAM
[Exam Deferred] : exam was deferred [Respiratory Effort] : normal respiratory effort [Calm] : calm [de-identified] : soft, non distended, non tender, RLQ incision and lower midline incision, no hernia [de-identified] : in no distress [de-identified] : normocephalic, atraumatic [de-identified] : moves all extremities [de-identified] : warm and dry [de-identified] : alert and oriented x3

## 2019-09-09 ENCOUNTER — RESULT REVIEW (OUTPATIENT)
Age: 64
End: 2019-09-09

## 2019-09-10 ENCOUNTER — APPOINTMENT (OUTPATIENT)
Dept: FAMILY MEDICINE | Facility: CLINIC | Age: 64
End: 2019-09-10
Payer: MEDICARE

## 2019-09-10 VITALS
DIASTOLIC BLOOD PRESSURE: 64 MMHG | BODY MASS INDEX: 26.5 KG/M2 | OXYGEN SATURATION: 98 % | HEART RATE: 68 BPM | HEIGHT: 62 IN | WEIGHT: 144 LBS | SYSTOLIC BLOOD PRESSURE: 102 MMHG

## 2019-09-10 PROCEDURE — 90670 PCV13 VACCINE IM: CPT

## 2019-09-10 PROCEDURE — G0009: CPT

## 2019-09-10 PROCEDURE — 99214 OFFICE O/P EST MOD 30 MIN: CPT | Mod: 25

## 2019-09-10 NOTE — HISTORY OF PRESENT ILLNESS
[FreeTextEntry1] : pt c/o pelvic/groin pain/pressure x 3 months pt saw GYN but all results came back (-)  [de-identified] : 64 yo female presents to office c c/o recurrent urinary frequency, urgency, and decreased urinary output.  pt states Urologist advised that her symptoms "are not from her kidneys.'  \par pt scheduled for ESWL on 10/7/19 c Dr. Solorio secondary to R renal calculi  at [facility] in [place].    pt UTD c Cardiac w/u, stress test performed today 9/10/19 results pending.  Echo to be scheduled  \par \par \par

## 2019-09-10 NOTE — PHYSICAL EXAM
[No Acute Distress] : no acute distress [Well Nourished] : well nourished [Well Developed] : well developed [Well-Appearing] : well-appearing [EOMI] : extraocular movements intact [Normal Outer Ear/Nose] : the outer ears and nose were normal in appearance [No JVD] : no jugular venous distention [No Respiratory Distress] : no respiratory distress  [Clear to Auscultation] : lungs were clear to auscultation bilaterally [No Accessory Muscle Use] : no accessory muscle use [Normal S1, S2] : normal S1 and S2 [Regular Rhythm] : with a regular rhythm [No Abdominal Bruit] : a ~M bruit was not heard ~T in the abdomen [No Edema] : there was no peripheral edema

## 2019-09-13 ENCOUNTER — APPOINTMENT (OUTPATIENT)
Dept: UROGYNECOLOGY | Facility: CLINIC | Age: 64
End: 2019-09-13
Payer: MEDICARE

## 2019-09-13 VITALS
DIASTOLIC BLOOD PRESSURE: 67 MMHG | WEIGHT: 144 LBS | TEMPERATURE: 97.8 F | HEART RATE: 63 BPM | OXYGEN SATURATION: 94 % | BODY MASS INDEX: 26.5 KG/M2 | SYSTOLIC BLOOD PRESSURE: 106 MMHG | HEIGHT: 62 IN

## 2019-09-13 DIAGNOSIS — Z83.1 FAMILY HISTORY OF OTHER INFECTIOUS AND PARASITIC DISEASES: ICD-10-CM

## 2019-09-13 DIAGNOSIS — Z86.39 PERSONAL HISTORY OF OTHER ENDOCRINE, NUTRITIONAL AND METABOLIC DISEASE: ICD-10-CM

## 2019-09-13 DIAGNOSIS — Z82.5 FAMILY HISTORY OF ASTHMA AND OTHER CHRONIC LOWER RESPIRATORY DISEASES: ICD-10-CM

## 2019-09-13 DIAGNOSIS — K59.00 CONSTIPATION, UNSPECIFIED: ICD-10-CM

## 2019-09-13 DIAGNOSIS — Z87.42 PERSONAL HISTORY OF OTHER DISEASES OF THE FEMALE GENITAL TRACT: ICD-10-CM

## 2019-09-13 DIAGNOSIS — Z84.1 FAMILY HISTORY OF DISORDERS OF KIDNEY AND URETER: ICD-10-CM

## 2019-09-13 DIAGNOSIS — R10.2 PELVIC AND PERINEAL PAIN: ICD-10-CM

## 2019-09-13 DIAGNOSIS — Z83.49 FAMILY HISTORY OF OTHER ENDOCRINE, NUTRITIONAL AND METABOLIC DISEASES: ICD-10-CM

## 2019-09-13 DIAGNOSIS — Z86.018 PERSONAL HISTORY OF OTHER BENIGN NEOPLASM: ICD-10-CM

## 2019-09-13 DIAGNOSIS — R32 UNSPECIFIED URINARY INCONTINENCE: ICD-10-CM

## 2019-09-13 DIAGNOSIS — N36.2 URETHRAL CARUNCLE: ICD-10-CM

## 2019-09-13 DIAGNOSIS — Z83.3 FAMILY HISTORY OF DIABETES MELLITUS: ICD-10-CM

## 2019-09-13 DIAGNOSIS — N39.46 MIXED INCONTINENCE: ICD-10-CM

## 2019-09-13 DIAGNOSIS — F17.210 NICOTINE DEPENDENCE, CIGARETTES, UNCOMPLICATED: ICD-10-CM

## 2019-09-13 DIAGNOSIS — R33.9 RETENTION OF URINE, UNSPECIFIED: ICD-10-CM

## 2019-09-13 PROCEDURE — 99204 OFFICE O/P NEW MOD 45 MIN: CPT

## 2019-09-13 PROCEDURE — 51701 INSERT BLADDER CATHETER: CPT

## 2019-09-13 NOTE — ASSESSMENT
[FreeTextEntry1] : Araceli is a pleasant 63 yo P5, PSHx includes C/S, KATIE/BSO, appendectomy, cholecystectomy, lithotripsy, presents with bothersome lower abdominal pressure, and less bothersome SANDRA. On exam, her empty supine CST was neg, and she did not have positive urethral hypermobility. Her straight-cath PVR volume was normal and was sent for UA, UCx, cytology. On pelvic exam, there were no appreciable masses or lesions. Cuff was intact and she had atrophic vaginal and urethral changes including a caruncle. Pelvic floor muscle contraction strength was present but weak. There was no levator or pelvic floor musculature banding, tightness, or tenderness. POPQ exam did not demonstrate clinically significant pelvic organ prolapse.\par \par I discussed that with lack of pelvic organ prolapse on exam, and lack of uterus / tubes / ovaries, unlikely pressure is urogyn in nature. Based on prior pelvic surgeries, I would recommend cysto to eval bladder to ensure no foreign body, but she already gets cystos per urology and is planning lithotripsy next month. I will f/o today's UA, UCx, cytol. She is having normal bowel movements without strainign with colace use, so I recommend she continue routine colonoscopy / CRS followup, and continues care with PMD and specialists prn. She understood.\par \par We discussed atrophic changes. Topical estrogen vs vaginal moisturizer discussed. R/B/A reviewed. SEs such as MI / stroke / VTE / estrog-dep cancer discussed, rare, and she would like to try - Rx sent to pharmacy.\par \par She is less bothered by SANDRA at present and would like to eval the abd pressure first and obtain her lithotripsy per urol on 10/7/19, then reassess. Nevertheless, SANDRA was reviewed briefly. The etiology of ETELVINA was discussed. Management options including observation, behavioral modifications, medication, pessary, Impressa insert, periurethral bulking via cystoscopy, and surgery with midurethral sling were reviewed. The etiology of OAB was discussed. Management options including observation, behavioral modifications (dietary changes, monitoring fluid intake, bladder training, timed voids, use of pads/protective garments), kegels, PT, medications, PTNS, SNS, and bladder Botox were all reviewed. She will observe for now.\par \par All questions answered.\par \par Plan:\par [] f/u UA, UCx, urine cytology\par [] start topical vaginal estrogen for atrophy, urethral caruncle, freq urge irritation\par [] RTO prn - to address SANDRA if and when desired\par

## 2019-09-13 NOTE — HISTORY OF PRESENT ILLNESS
[FreeTextEntry1] : About 9 months of diffuse lower abdominal pelvic pressure and pain, feels it near the anterior abd wall, not affected by positional changes, no aggravating or alleviating factors, constant, not related to bowel mvoemetns which she reports are normal - prior constipation is controlled with colace use now. No bulge or protrusion from the vagina, no vaginal pressure symptoms however +suprapubic pressure as noted above. Does not see a bulge from abd wall. No vaginal bleeding, sexually active, no issue besides dryness. Leakage of urine with cough sneeze and urgency, rare and small amounts, wears a liner for protection, present for years and less bothersome. No gross hematuria, no flank pain, known hx and current renal stones scheduled for next lithotripsy with urology on 10/7/19. Urology has done cysto, per patient normal wtihout pathology noted. \par \par Underwent abd sono in 2018 - bilateral kidneys normal without hydronephrosis, bilateral nonobstructing renal stones, PVR < 100ml, normal bilateral UO jets.\par \par PSH includes KATIE/BSO, , appendectomy, lithotripsy, cholecystectomy (midline vertical infraumbilical to suprapubic scar)\par Smoker

## 2019-09-13 NOTE — PROCEDURE
[Straight Catheterization] : insertion of a straight catheter [Urinary Retention] : urinary retention [Hematuria] : hematuria [Stress Incontinence] : stress incontinence [Urgent Incontinence] : urgent incontinence [Urinary Frequency] : urinary frequency [Patient] : the patient [___ Fr Straight Tip] : a [unfilled] in [place] straight tip catheter [None] : there were no complications with the catheter insertion [Clear] : clear [Culture] : culture [Cytology] : cytology [Urinalysis] : urinalysis [No Complications] : no complications [Tolerated Well] : the patient tolerated the procedure well [1] : 1 [Post procedure instructions and information given] : Post procedure instructions and information were given and reviewed with patient. [FreeTextEntry1] : catheterized to obtain uncontaminated specimen

## 2019-09-13 NOTE — PHYSICAL EXAM
[No Acute Distress] : in no acute distress [Oriented x3] : oriented to person, place, and time [No Edema] : ~T edema was not present [Symmetrical] : the neck was ~L symmetrical [Soft, Nontender] : the abdomen was soft and nontender [None] : no CVA tenderness [Warm and Dry] : was warm and dry to touch [Normal Gait] : gait was normal [Labia Majora] : were normal [Labia Minora] : were normal [Bartholin's Gland] : both Bartholin's glands were normal  [Atrophy] : atrophy [Normal] : was normal [No Bleeding] : there was no active vaginal bleeding [2] : 2 [Aa ____] : Aa [unfilled] [C ____] : C [unfilled] [Ba ____] : Ba [unfilled] [PB ____] : PB [unfilled] [GH ____] : GH [unfilled] [Ap ____] : Ap [unfilled] [TVL ____] : TVL  [unfilled] [Bp ____] : Bp [unfilled] [] : 0 [Absent] : absent [Soft] :  the cervix was soft [Post Void Residual ____ml] : post void residual via catheterization was [unfilled] ml [Exam Deferred] : was deferred [Cough] : no cough [Tenderness] : ~T no ~M abdominal tenderness observed [Distended] : not distended [Inguinal LAD] : no adenopathy was noted in the inguinal lymph nodes [FreeTextEntry3] : no urethral hyermobility, +atrophic, +caruncle [FreeTextEntry4] : narrowed atrophic introitus; cuff intact; no mass / lesion / cyst [de-identified] : CST neg

## 2019-09-13 NOTE — CHIEF COMPLAINT
[Questionnaire Received] : Patient questionnaire received [Poor Bladder Control] : poor bladder control [Urine Frequency] : urine frequency [Poor/Slow Urine Flow] : poor/slow urine flow

## 2019-09-13 NOTE — OB HISTORY
[Vaginal ___] : [unfilled] vaginal delivery(s) [ ___] : [unfilled]  section delivery(s) [Sexually Active] : sexually active [FreeTextEntry1] : largest baby 6 lbs

## 2019-09-16 ENCOUNTER — RESULT REVIEW (OUTPATIENT)
Age: 64
End: 2019-09-16

## 2019-09-16 LAB
APPEARANCE: CLEAR
BACTERIA UR CULT: NORMAL
BACTERIA: NEGATIVE
BILIRUBIN URINE: NEGATIVE
BLOOD URINE: ABNORMAL
COLOR: YELLOW
GLUCOSE QUALITATIVE U: NEGATIVE
HYALINE CASTS: 3 /LPF
KETONES URINE: NEGATIVE
LEUKOCYTE ESTERASE URINE: ABNORMAL
MICROSCOPIC-UA: NORMAL
NITRITE URINE: NEGATIVE
PH URINE: 6
PROTEIN URINE: ABNORMAL
RED BLOOD CELLS URINE: 6 /HPF
SPECIFIC GRAVITY URINE: 1.02
SQUAMOUS EPITHELIAL CELLS: 1 /HPF
UROBILINOGEN URINE: NORMAL
WHITE BLOOD CELLS URINE: 25 /HPF

## 2019-09-17 LAB — URINE CYTOLOGY: NORMAL

## 2019-09-20 ENCOUNTER — APPOINTMENT (OUTPATIENT)
Dept: OBGYN | Facility: CLINIC | Age: 64
End: 2019-09-20
Payer: MEDICARE

## 2019-09-20 VITALS
HEIGHT: 62 IN | BODY MASS INDEX: 26.87 KG/M2 | DIASTOLIC BLOOD PRESSURE: 70 MMHG | SYSTOLIC BLOOD PRESSURE: 120 MMHG | WEIGHT: 146 LBS

## 2019-09-20 LAB
BILIRUB UR QL STRIP: NORMAL
GLUCOSE UR-MCNC: NORMAL
HCG UR QL: 0.2 EU/DL
HGB UR QL STRIP.AUTO: NORMAL
KETONES UR-MCNC: NORMAL
LEUKOCYTE ESTERASE UR QL STRIP: NORMAL
NITRITE UR QL STRIP: NORMAL
PH UR STRIP: 5
PROT UR STRIP-MCNC: 30
SP GR UR STRIP: 1.02

## 2019-09-20 PROCEDURE — 81003 URINALYSIS AUTO W/O SCOPE: CPT | Mod: QW

## 2019-09-20 PROCEDURE — 99202 OFFICE O/P NEW SF 15 MIN: CPT

## 2019-09-20 RX ORDER — CHOLECALCIFEROL (VITAMIN D3) 50 MCG
50 MCG TABLET ORAL
Qty: 90 | Refills: 3 | Status: DISCONTINUED | COMMUNITY
Start: 2017-03-10 | End: 2019-09-20

## 2019-09-20 NOTE — HISTORY OF PRESENT ILLNESS
[Last Mammogram ___] : Last Mammogram was [unfilled] [Last Colonoscopy ___] : Last colonoscopy [unfilled] [Last Pap ___] : Last cervical pap smear was [unfilled] [Definite:  ___ (Date)] : the last menstrual period was [unfilled] [Sexually Active] : is sexually active [Male ___] : [unfilled] male

## 2019-09-20 NOTE — PHYSICAL EXAM
[Labia Majora] : labia major [Labia Minora] : labia minora [Normal] : clitoris [Adnexa Absent] : absent bilaterally [Absent] : was absent

## 2019-09-20 NOTE — REVIEW OF SYSTEMS
[Incontinence] : incontinence [Urgency] : urgency [Frequency] : frequency [Abdominal Pain] : abdominal pain [Headache] : headache [Sleep Disturbances] : sleep disturbances [Anxiety] : anxiety

## 2019-09-24 LAB — HPV HIGH+LOW RISK DNA PNL CVX: NOT DETECTED

## 2019-09-26 LAB — CYTOLOGY CVX/VAG DOC THIN PREP: ABNORMAL

## 2019-10-02 ENCOUNTER — APPOINTMENT (OUTPATIENT)
Dept: FAMILY MEDICINE | Facility: CLINIC | Age: 64
End: 2019-10-02
Payer: MEDICARE

## 2019-10-02 VITALS
WEIGHT: 145 LBS | BODY MASS INDEX: 26.68 KG/M2 | DIASTOLIC BLOOD PRESSURE: 70 MMHG | OXYGEN SATURATION: 98 % | SYSTOLIC BLOOD PRESSURE: 118 MMHG | HEART RATE: 70 BPM | HEIGHT: 62 IN

## 2019-10-02 VITALS — SYSTOLIC BLOOD PRESSURE: 105 MMHG | DIASTOLIC BLOOD PRESSURE: 65 MMHG

## 2019-10-02 PROCEDURE — 99214 OFFICE O/P EST MOD 30 MIN: CPT

## 2019-10-02 RX ORDER — BENZONATATE 200 MG/1
200 CAPSULE ORAL 3 TIMES DAILY
Qty: 30 | Refills: 1 | Status: DISCONTINUED | COMMUNITY
Start: 2019-04-05 | End: 2019-10-02

## 2019-10-02 RX ORDER — ESTRADIOL 0.1 MG/G
0.1 CREAM VAGINAL
Qty: 1 | Refills: 2 | Status: DISCONTINUED | COMMUNITY
Start: 2019-09-13 | End: 2019-10-02

## 2019-10-02 RX ORDER — IBUPROFEN 200 MG/1
TABLET, COATED ORAL
Refills: 0 | Status: COMPLETED | COMMUNITY
End: 2019-10-02

## 2019-10-02 NOTE — PHYSICAL EXAM
[No Acute Distress] : no acute distress [Well Nourished] : well nourished [Well Developed] : well developed [Well-Appearing] : well-appearing [EOMI] : extraocular movements intact [Normal Outer Ear/Nose] : the outer ears and nose were normal in appearance [No JVD] : no jugular venous distention [No Lymphadenopathy] : no lymphadenopathy [Supple] : supple [No Respiratory Distress] : no respiratory distress  [No Accessory Muscle Use] : no accessory muscle use [Clear to Auscultation] : lungs were clear to auscultation bilaterally [Normal Rate] : normal rate  [Regular Rhythm] : with a regular rhythm [Normal S1, S2] : normal S1 and S2 [No Carotid Bruits] : no carotid bruits [No Edema] : there was no peripheral edema [No Palpable Aorta] : no palpable aorta [Non-distended] : non-distended [No CVA Tenderness] : no CVA  tenderness [Grossly Normal Strength/Tone] : grossly normal strength/tone [No Rash] : no rash [Coordination Grossly Intact] : coordination grossly intact [No Focal Deficits] : no focal deficits [Normal Gait] : normal gait [Normal Affect] : the affect was normal [Normal Mood] : the mood was normal [Normal Insight/Judgement] : insight and judgment were intact

## 2019-11-08 ENCOUNTER — APPOINTMENT (OUTPATIENT)
Dept: FAMILY MEDICINE | Facility: CLINIC | Age: 64
End: 2019-11-08
Payer: MEDICARE

## 2019-11-08 VITALS
OXYGEN SATURATION: 97 % | HEART RATE: 65 BPM | SYSTOLIC BLOOD PRESSURE: 104 MMHG | WEIGHT: 151 LBS | BODY MASS INDEX: 27.79 KG/M2 | DIASTOLIC BLOOD PRESSURE: 64 MMHG | TEMPERATURE: 97.8 F | HEIGHT: 62 IN

## 2019-11-08 PROCEDURE — 99214 OFFICE O/P EST MOD 30 MIN: CPT | Mod: 25

## 2019-11-08 PROCEDURE — G0008: CPT

## 2019-11-08 PROCEDURE — 90686 IIV4 VACC NO PRSV 0.5 ML IM: CPT

## 2019-11-08 NOTE — PHYSICAL EXAM
[No Acute Distress] : no acute distress [Well Nourished] : well nourished [Well Developed] : well developed [EOMI] : extraocular movements intact [Well-Appearing] : well-appearing [No JVD] : no jugular venous distention [No Respiratory Distress] : no respiratory distress  [Normal Outer Ear/Nose] : the outer ears and nose were normal in appearance [Normal Rate] : normal rate  [No Accessory Muscle Use] : no accessory muscle use [Clear to Auscultation] : lungs were clear to auscultation bilaterally [Regular Rhythm] : with a regular rhythm [No Edema] : there was no peripheral edema [Normal S1, S2] : normal S1 and S2 [Normal Anterior Cervical Nodes] : no anterior cervical lymphadenopathy [Normal Posterior Cervical Nodes] : no posterior cervical lymphadenopathy [Non-distended] : non-distended [No CVA Tenderness] : no CVA  tenderness [Grossly Normal Strength/Tone] : grossly normal strength/tone [No Rash] : no rash [Coordination Grossly Intact] : coordination grossly intact [Normal Gait] : normal gait [No Focal Deficits] : no focal deficits [Normal Mood] : the mood was normal [Normal Affect] : the affect was normal [Normal Insight/Judgement] : insight and judgment were intact

## 2019-11-08 NOTE — ASSESSMENT
[FreeTextEntry1] : cont'd Urology f/u \par \par pt to reschedule Colposcopy post 12/4/19  \par \par see immunization orders
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
## 2019-11-11 ENCOUNTER — MED ADMIN CHARGE (OUTPATIENT)
Age: 64
End: 2019-11-11

## 2019-11-13 ENCOUNTER — EMERGENCY (EMERGENCY)
Facility: HOSPITAL | Age: 64
LOS: 1 days | Discharge: HOME | End: 2019-11-13
Attending: STUDENT IN AN ORGANIZED HEALTH CARE EDUCATION/TRAINING PROGRAM | Admitting: EMERGENCY MEDICINE
Payer: MEDICARE

## 2019-11-13 VITALS
HEIGHT: 60 IN | DIASTOLIC BLOOD PRESSURE: 63 MMHG | OXYGEN SATURATION: 100 % | WEIGHT: 145.95 LBS | SYSTOLIC BLOOD PRESSURE: 131 MMHG | HEART RATE: 71 BPM | RESPIRATION RATE: 18 BRPM | TEMPERATURE: 97 F

## 2019-11-13 VITALS
RESPIRATION RATE: 17 BRPM | DIASTOLIC BLOOD PRESSURE: 63 MMHG | OXYGEN SATURATION: 99 % | HEART RATE: 72 BPM | SYSTOLIC BLOOD PRESSURE: 135 MMHG | TEMPERATURE: 98 F

## 2019-11-13 LAB
ALBUMIN SERPL ELPH-MCNC: 3.7 G/DL — SIGNIFICANT CHANGE UP (ref 3.3–5)
ALP SERPL-CCNC: 88 U/L — SIGNIFICANT CHANGE UP (ref 40–120)
ALT FLD-CCNC: 24 U/L — SIGNIFICANT CHANGE UP (ref 12–78)
ANION GAP SERPL CALC-SCNC: 7 MMOL/L — SIGNIFICANT CHANGE UP (ref 5–17)
APPEARANCE UR: ABNORMAL
AST SERPL-CCNC: 20 U/L — SIGNIFICANT CHANGE UP (ref 15–37)
BACTERIA # UR AUTO: ABNORMAL
BASOPHILS # BLD AUTO: 0.04 K/UL — SIGNIFICANT CHANGE UP (ref 0–0.2)
BASOPHILS NFR BLD AUTO: 0.4 % — SIGNIFICANT CHANGE UP (ref 0–2)
BILIRUB SERPL-MCNC: 0.2 MG/DL — SIGNIFICANT CHANGE UP (ref 0.2–1.2)
BILIRUB UR-MCNC: NEGATIVE — SIGNIFICANT CHANGE UP
BUN SERPL-MCNC: 17 MG/DL — SIGNIFICANT CHANGE UP (ref 7–23)
CALCIUM SERPL-MCNC: 9.4 MG/DL — SIGNIFICANT CHANGE UP (ref 8.5–10.1)
CHLORIDE SERPL-SCNC: 110 MMOL/L — HIGH (ref 96–108)
CO2 SERPL-SCNC: 25 MMOL/L — SIGNIFICANT CHANGE UP (ref 22–31)
COLOR SPEC: YELLOW — SIGNIFICANT CHANGE UP
COMMENT - URINE: SIGNIFICANT CHANGE UP
CREAT SERPL-MCNC: 0.8 MG/DL — SIGNIFICANT CHANGE UP (ref 0.5–1.3)
DIFF PNL FLD: ABNORMAL
EOSINOPHIL # BLD AUTO: 0.04 K/UL — SIGNIFICANT CHANGE UP (ref 0–0.5)
EOSINOPHIL NFR BLD AUTO: 0.4 % — SIGNIFICANT CHANGE UP (ref 0–6)
EPI CELLS # UR: ABNORMAL
GLUCOSE SERPL-MCNC: 119 MG/DL — HIGH (ref 70–99)
GLUCOSE UR QL: NEGATIVE — SIGNIFICANT CHANGE UP
HCT VFR BLD CALC: 40.1 % — SIGNIFICANT CHANGE UP (ref 34.5–45)
HGB BLD-MCNC: 13.4 G/DL — SIGNIFICANT CHANGE UP (ref 11.5–15.5)
IMM GRANULOCYTES NFR BLD AUTO: 0.4 % — SIGNIFICANT CHANGE UP (ref 0–1.5)
KETONES UR-MCNC: ABNORMAL
LACTATE SERPL-SCNC: 1.6 MMOL/L — SIGNIFICANT CHANGE UP (ref 0.7–2)
LEUKOCYTE ESTERASE UR-ACNC: ABNORMAL
LYMPHOCYTES # BLD AUTO: 1.6 K/UL — SIGNIFICANT CHANGE UP (ref 1–3.3)
LYMPHOCYTES # BLD AUTO: 14 % — SIGNIFICANT CHANGE UP (ref 13–44)
MCHC RBC-ENTMCNC: 29.9 PG — SIGNIFICANT CHANGE UP (ref 27–34)
MCHC RBC-ENTMCNC: 33.4 GM/DL — SIGNIFICANT CHANGE UP (ref 32–36)
MCV RBC AUTO: 89.5 FL — SIGNIFICANT CHANGE UP (ref 80–100)
MONOCYTES # BLD AUTO: 0.4 K/UL — SIGNIFICANT CHANGE UP (ref 0–0.9)
MONOCYTES NFR BLD AUTO: 3.5 % — SIGNIFICANT CHANGE UP (ref 2–14)
NEUTROPHILS # BLD AUTO: 9.27 K/UL — HIGH (ref 1.8–7.4)
NEUTROPHILS NFR BLD AUTO: 81.3 % — HIGH (ref 43–77)
NITRITE UR-MCNC: NEGATIVE — SIGNIFICANT CHANGE UP
NRBC # BLD: 0 /100 WBCS — SIGNIFICANT CHANGE UP (ref 0–0)
PH UR: 6.5 — SIGNIFICANT CHANGE UP (ref 5–8)
PLATELET # BLD AUTO: 240 K/UL — SIGNIFICANT CHANGE UP (ref 150–400)
POTASSIUM SERPL-MCNC: 3.6 MMOL/L — SIGNIFICANT CHANGE UP (ref 3.5–5.3)
POTASSIUM SERPL-SCNC: 3.6 MMOL/L — SIGNIFICANT CHANGE UP (ref 3.5–5.3)
PROT SERPL-MCNC: 7.9 G/DL — SIGNIFICANT CHANGE UP (ref 6–8.3)
PROT UR-MCNC: 25 MG/DL
RBC # BLD: 4.48 M/UL — SIGNIFICANT CHANGE UP (ref 3.8–5.2)
RBC # FLD: 13.1 % — SIGNIFICANT CHANGE UP (ref 10.3–14.5)
RBC CASTS # UR COMP ASSIST: >50 /HPF (ref 0–4)
SODIUM SERPL-SCNC: 142 MMOL/L — SIGNIFICANT CHANGE UP (ref 135–145)
SP GR SPEC: 1.01 — SIGNIFICANT CHANGE UP (ref 1.01–1.02)
UROBILINOGEN FLD QL: NEGATIVE — SIGNIFICANT CHANGE UP
WBC # BLD: 11.39 K/UL — HIGH (ref 3.8–10.5)
WBC # FLD AUTO: 11.39 K/UL — HIGH (ref 3.8–10.5)
WBC UR QL: ABNORMAL

## 2019-11-13 PROCEDURE — 85027 COMPLETE CBC AUTOMATED: CPT

## 2019-11-13 PROCEDURE — 74176 CT ABD & PELVIS W/O CONTRAST: CPT

## 2019-11-13 PROCEDURE — 80053 COMPREHEN METABOLIC PANEL: CPT

## 2019-11-13 PROCEDURE — 96375 TX/PRO/DX INJ NEW DRUG ADDON: CPT

## 2019-11-13 PROCEDURE — 96374 THER/PROPH/DIAG INJ IV PUSH: CPT

## 2019-11-13 PROCEDURE — 74176 CT ABD & PELVIS W/O CONTRAST: CPT | Mod: 26

## 2019-11-13 PROCEDURE — 87086 URINE CULTURE/COLONY COUNT: CPT

## 2019-11-13 PROCEDURE — 83605 ASSAY OF LACTIC ACID: CPT

## 2019-11-13 PROCEDURE — 81001 URINALYSIS AUTO W/SCOPE: CPT

## 2019-11-13 PROCEDURE — 87040 BLOOD CULTURE FOR BACTERIA: CPT

## 2019-11-13 PROCEDURE — 96361 HYDRATE IV INFUSION ADD-ON: CPT

## 2019-11-13 PROCEDURE — 36415 COLL VENOUS BLD VENIPUNCTURE: CPT

## 2019-11-13 PROCEDURE — 96376 TX/PRO/DX INJ SAME DRUG ADON: CPT

## 2019-11-13 PROCEDURE — 99285 EMERGENCY DEPT VISIT HI MDM: CPT

## 2019-11-13 PROCEDURE — 99284 EMERGENCY DEPT VISIT MOD MDM: CPT | Mod: 25

## 2019-11-13 RX ORDER — HYDROMORPHONE HYDROCHLORIDE 2 MG/ML
0.5 INJECTION INTRAMUSCULAR; INTRAVENOUS; SUBCUTANEOUS ONCE
Refills: 0 | Status: DISCONTINUED | OUTPATIENT
Start: 2019-11-13 | End: 2019-11-13

## 2019-11-13 RX ORDER — TAMSULOSIN HYDROCHLORIDE 0.4 MG/1
1 CAPSULE ORAL
Qty: 5 | Refills: 0
Start: 2019-11-13 | End: 2019-11-17

## 2019-11-13 RX ORDER — CIPROFLOXACIN LACTATE 400MG/40ML
1 VIAL (ML) INTRAVENOUS
Qty: 14 | Refills: 0
Start: 2019-11-13 | End: 2019-11-19

## 2019-11-13 RX ORDER — SODIUM CHLORIDE 9 MG/ML
1000 INJECTION INTRAMUSCULAR; INTRAVENOUS; SUBCUTANEOUS ONCE
Refills: 0 | Status: COMPLETED | OUTPATIENT
Start: 2019-11-13 | End: 2019-11-13

## 2019-11-13 RX ORDER — ONDANSETRON 8 MG/1
4 TABLET, FILM COATED ORAL ONCE
Refills: 0 | Status: COMPLETED | OUTPATIENT
Start: 2019-11-13 | End: 2019-11-13

## 2019-11-13 RX ORDER — ONDANSETRON 8 MG/1
1 TABLET, FILM COATED ORAL
Qty: 20 | Refills: 0
Start: 2019-11-13 | End: 2019-11-17

## 2019-11-13 RX ORDER — IBUPROFEN 200 MG
1 TABLET ORAL
Qty: 15 | Refills: 0
Start: 2019-11-13 | End: 2019-11-17

## 2019-11-13 RX ADMIN — HYDROMORPHONE HYDROCHLORIDE 0.5 MILLIGRAM(S): 2 INJECTION INTRAMUSCULAR; INTRAVENOUS; SUBCUTANEOUS at 06:54

## 2019-11-13 RX ADMIN — HYDROMORPHONE HYDROCHLORIDE 0.5 MILLIGRAM(S): 2 INJECTION INTRAMUSCULAR; INTRAVENOUS; SUBCUTANEOUS at 06:39

## 2019-11-13 RX ADMIN — SODIUM CHLORIDE 1000 MILLILITER(S): 9 INJECTION INTRAMUSCULAR; INTRAVENOUS; SUBCUTANEOUS at 07:39

## 2019-11-13 RX ADMIN — SODIUM CHLORIDE 1000 MILLILITER(S): 9 INJECTION INTRAMUSCULAR; INTRAVENOUS; SUBCUTANEOUS at 06:39

## 2019-11-13 RX ADMIN — HYDROMORPHONE HYDROCHLORIDE 0.5 MILLIGRAM(S): 2 INJECTION INTRAMUSCULAR; INTRAVENOUS; SUBCUTANEOUS at 08:12

## 2019-11-13 RX ADMIN — ONDANSETRON 4 MILLIGRAM(S): 8 TABLET, FILM COATED ORAL at 06:38

## 2019-11-13 NOTE — ED PROVIDER NOTE - PATIENT PORTAL LINK FT
You can access the FollowMyHealth Patient Portal offered by [facility] by registering at the following website: [email or web address]. By joining [employer]’s FollowMyHealth portal, you will also be able to view your health information using other applications (apps) compatible with our system.

## 2019-11-13 NOTE — ED PROVIDER NOTE - CLINICAL SUMMARY MEDICAL DECISION MAKING FREE TEXT BOX
64 year old female with history of recent lithotripsy p/w right flank pain n/v/chills.  Labs, CT, analgesia, hydrate, consult urology

## 2019-11-13 NOTE — ED ADULT TRIAGE NOTE - CHIEF COMPLAINT QUOTE
Pt. complaining of right sided lower abdominal pain radiating to flank. Pt. reported lithotripsy 10/7/19 with Dr. Mcguire. Pt. complaining of nausea, vomiting, diarrhea and chills. Pt. denied fevers.

## 2019-11-13 NOTE — ED ADULT NURSE NOTE - OBJECTIVE STATEMENT
64 yr old female presents to the ED with c/o right sided abdominal pain. A+O x 4.  at the bedside. Recent (last month) Pt reports she had a lithotripsy done last month. + BS in all quadrants. Denies fever, headache, recent sick travel, CP, sob, changes in urinary/bowel patterns, hematuria. Pt reports chills, NV, and back pain radiating to the anterior abdomen.

## 2019-11-13 NOTE — ED ADULT NURSE NOTE - NSIMPLEMENTINTERV_GEN_ALL_ED
Implemented All Universal Safety Interventions:  [place] to call system. Call bell, personal items and telephone within reach. Instruct patient to call for assistance. Room bathroom lighting operational. Non-slip footwear when patient is off stretcher. Physically safe environment: no spills, clutter or unnecessary equipment. Stretcher in lowest position, wheels locked, appropriate side rails in place.

## 2019-11-13 NOTE — ED PROVIDER NOTE - NOTES
Awaiting return call If UA clean and pain controlled in ED, patient can likely go home.  If UA positive, please call back to discuss plan.  Keep patient NPO

## 2019-11-13 NOTE — ED ADULT NURSE REASSESSMENT NOTE - NS ED NURSE REASSESS COMMENT FT1
Report received from Tessie PU[facility] md aware of vitals. safety maintained. pt mentating at baseline. no change in neuro status.  pt placed in position of comfort, given warm blankets. safety maintained. will continue to monitor.

## 2019-11-13 NOTE — ED PROVIDER NOTE - OBJECTIVE STATEMENT
64 year old female with a history of asthma, kidney stones, migraines, hypothyroid presents with right flank pain.  Patient had a lithotripsy performed on 10/7 with Dr. Moreira.  Since then, she's been passing kidney stones.  3 days ago, she developed right flank and groin pain and felt like a stone was about to pass.  However, the stone has not passed and the pain has worsened.  At 6pm yesterday she began to vomit > 10 episodes and have chills.  She also reports dark urine, at times bloody.  No fever.  She has been taking Advil without relief.  PMD Dr. Yarely Jarrett, Urology Dr. Moreira

## 2019-11-13 NOTE — ED ADULT NURSE NOTE - PSH
Asthma     Delivery    History of Appendectomy    Kidney Calculus- Lithotripsy x 3    S/P Hysterectomy    S/P Laparoscopic Cholecystectomy    S/P Total Thyroidectomy

## 2019-11-13 NOTE — ED PROVIDER NOTE - GASTROINTESTINAL, MLM
Abdomen soft, right flank tenderness and  RLQ tenderness without rebound or guarding, +BS, +right CVA tenderness .

## 2019-11-13 NOTE — ED PROVIDER NOTE - DISCHARGE DATE
Problem: Communication  Goal: The ability to communicate needs accurately and effectively will improve  Outcome: PROGRESSING AS EXPECTED  Discussed POC and pt verbalizes understanding. IV antibiotics and pain management.     Problem: Pain Management  Goal: Pain level will decrease to patient's comfort goal  Outcome: PROGRESSING AS EXPECTED  Pain meds given per MAR. Discussed rest periods.       13-Nov-2019

## 2019-11-14 LAB
CULTURE RESULTS: SIGNIFICANT CHANGE UP
SPECIMEN SOURCE: SIGNIFICANT CHANGE UP

## 2019-11-18 LAB
CULTURE RESULTS: SIGNIFICANT CHANGE UP
CULTURE RESULTS: SIGNIFICANT CHANGE UP
SPECIMEN SOURCE: SIGNIFICANT CHANGE UP
SPECIMEN SOURCE: SIGNIFICANT CHANGE UP

## 2019-12-06 ENCOUNTER — OTHER (OUTPATIENT)
Age: 64
End: 2019-12-06

## 2019-12-06 DIAGNOSIS — Z87.442 PERSONAL HISTORY OF URINARY CALCULI: ICD-10-CM

## 2019-12-07 NOTE — HISTORY OF PRESENT ILLNESS
[No Pertinent Cardiac History] : no history of aortic stenosis, atrial fibrillation, coronary artery disease, recent myocardial infarction, or implantable device/pacemaker [Asthma] : asthma [COPD] : COPD [Smoker] : smoker [No Adverse Anesthesia Reaction] : no adverse anesthesia reaction in self or family member [(Patient denies any chest pain, claudication, dyspnea on exertion, orthopnea, palpitations or syncope)] : Patient denies any chest pain, claudication, dyspnea on exertion, orthopnea, palpitations or syncope [Aortic Stenosis] : no aortic stenosis [Atrial Fibrillation] : no atrial fibrillation [Implantable Device/Pacemaker] : no implantable device/pacemaker [Coronary Artery Disease] : no coronary artery disease [Recent Myocardial Infarction] : no recent myocardial infarction [Chronic Anticoagulation] : no chronic anticoagulation [Sleep Apnea] : no sleep apnea [FreeTextEntry1] :  [facility]  [Chronic Kidney Disease] : no chronic kidney disease [Diabetes] : no diabetes [FreeTextEntry3] : Dr. Javier Moreira [FreeTextEntry4] : 65 yo female presents to office for medical clearance.  Scheduled for ESWL on 10/7/19 c Dr. Moreira at [facility] in [place], [place]  [phone]\par and (F) [phone].   no f/c/s no CP/SOB c activity no dizziness no palpitations.  Cardiac clearance performed c Dr. Malhotra [phone]. \par no N/V/D no bloody/black stools \par no urinary discomfort at this time \par \par NO hx of adverse reaction to anesthesia \par POSITIVE Shellfish allergy  \par NO hx of blood transfusion  \par NO dentures    [FreeTextEntry2] : 10/7/19 [FreeTextEntry6] : +smoker 5-6 cigs/day \par mild COPD as per PA/Lat CXR 9/26/19

## 2019-12-07 NOTE — ASSESSMENT
[Patient Optimized for Surgery] : Patient optimized for surgery [As per surgery] : as per surgery [FreeTextEntry4] : Medically optimized for proposed procedure.   Cardiac clearance c Dr. Malhotra \par \par Addendum Note:  63 yo female medically optimized for proposed ureteral stent removal on 12/9/19 c Dr. Moreira based on above physical exam and \par labs performed on 12/6/19.

## 2019-12-29 ENCOUNTER — EMERGENCY (EMERGENCY)
Facility: HOSPITAL | Age: 64
LOS: 1 days | Discharge: HOME | End: 2019-12-29
Attending: EMERGENCY MEDICINE | Admitting: EMERGENCY MEDICINE
Payer: COMMERCIAL

## 2019-12-29 VITALS
TEMPERATURE: 98 F | WEIGHT: 145.06 LBS | HEIGHT: 60 IN | SYSTOLIC BLOOD PRESSURE: 124 MMHG | OXYGEN SATURATION: 98 % | HEART RATE: 67 BPM | RESPIRATION RATE: 16 BRPM | DIASTOLIC BLOOD PRESSURE: 73 MMHG

## 2019-12-29 VITALS
SYSTOLIC BLOOD PRESSURE: 112 MMHG | TEMPERATURE: 98 F | HEART RATE: 59 BPM | OXYGEN SATURATION: 99 % | RESPIRATION RATE: 16 BRPM | DIASTOLIC BLOOD PRESSURE: 71 MMHG

## 2019-12-29 PROCEDURE — 99284 EMERGENCY DEPT VISIT MOD MDM: CPT | Mod: 25

## 2019-12-29 PROCEDURE — 72040 X-RAY EXAM NECK SPINE 2-3 VW: CPT

## 2019-12-29 PROCEDURE — 72110 X-RAY EXAM L-2 SPINE 4/>VWS: CPT

## 2019-12-29 PROCEDURE — 72110 X-RAY EXAM L-2 SPINE 4/>VWS: CPT | Mod: 26

## 2019-12-29 PROCEDURE — 99283 EMERGENCY DEPT VISIT LOW MDM: CPT

## 2019-12-29 PROCEDURE — 71046 X-RAY EXAM CHEST 2 VIEWS: CPT | Mod: 26

## 2019-12-29 PROCEDURE — 71046 X-RAY EXAM CHEST 2 VIEWS: CPT

## 2019-12-29 PROCEDURE — 72040 X-RAY EXAM NECK SPINE 2-3 VW: CPT | Mod: 26
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
## 2019-12-29 NOTE — ED PROVIDER NOTE - PATIENT PORTAL LINK FT
You can access the FollowMyHealth Patient Portal offered by [facility] by registering at the following website: [email or web address]. By joining [employer]’s FollowMyHealth portal, you will also be able to view your health information using other applications (apps) compatible with our system.

## 2019-12-29 NOTE — ED PROVIDER NOTE - PHYSICAL EXAMINATION
· CONSTITUTIONAL: Well appearing, well nourished, awake, alert, oriented to person, place, time/situation and in no apparent distress. non-toxic, well appearing.   · ENMT: Airway patent, Nasal mucosa clear. Mouth with normal mucosa. Throat has no vesicles, no oropharyngeal exudates and uvula is midline. MM moist.  no external signs of head trauma.  · EYES: Clear bilaterally, pupils equal, round and reactive to light. Extra-ocular muscles intact.  · CARDIAC: Normal rate, regular rhythm.  Heart sounds S1, S2.  No murmurs, rubs or gallops.  · RESPIRATORY: Breath sounds clear and equal bilaterally. nl resp effort.  No Wheeze / Rhonci / Rales.  · GASTROINTESTINAL: Abdomen soft, non-tender, no guarding. non-distended. no hsm. no CVA tenderness. no acute signs of truncal trauma.  · GENITOURINARY:  Bladder: non-tender / non-distended  · MUSCULOSKELETAL: Spine appears normal, No midline spinal tenderness (Cervical, thoracic, Lumbar). Mild paraspinal lumbar and cervical tend. Range of motion in all extremities is not limited, no muscle or joint tenderness  · NEUROLOGICAL: Alert and oriented, no focal deficits, no motor or sensory deficits. Normal, non-focal detailed neurologic exam.  · SKIN: Skin normal color for race, warm, dry and intact. No evidence of rash.  · HEME LYMPH: No adenopathy or splenomegaly.

## 2019-12-29 NOTE — ED ADULT NURSE NOTE - NSIMPLEMENTINTERV_GEN_ALL_ED
Implemented All Universal Safety Interventions:  [place] to call system. Call bell, personal items and telephone within reach. Instruct patient to call for assistance. Room bathroom lighting operational. Non-slip footwear when patient is off stretcher. Physically safe environment: no spills, clutter or unnecessary equipment. Stretcher in lowest position, wheels locked, appropriate side rails in place.

## 2019-12-29 NOTE — ED PROVIDER NOTE - CARE PROVIDER_API CALL
Ofelia Miguel (DO)  Orthopaedic Surgery Surgery  [address], [place] [address]  Phone: [phone]  Fax: [phone]  Follow Up Time: Deuce Slade)  Orthopaedic Surgery  [address] [place] [address]  Phone: [phone]  Fax: [phone]  Follow Up Time:
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
## 2019-12-29 NOTE — ED PROVIDER NOTE - NSFOLLOWUPINSTRUCTIONS_ED_ALL_ED_FT
1) Follow-up with your Primary Medical Doctor. Call tomorrow for prompt follow-up.  2) Return to Emergency room for any worsening or persistent pain, shortness of breath, chest pains, abdominal pain, numbness, tingling, headaches, vomiting, visual changes, dizziness, weakness, fever, or any other concerning symptoms.  3) See attached instruction sheets for additional information, including information regarding signs and symptoms to look out for, reasons to seek immediate care and other important instructions.  4) Follow-up with Orthopedics, call in morning
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
## 2019-12-29 NOTE — ED PROVIDER NOTE - CHPI ED SYMPTOMS NEG
no decreased eating/drinking/no difficulty bearing weight/no dizziness/no headache/no disorientation/no laceration/no loss of consciousness
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
## 2020-01-31 ENCOUNTER — APPOINTMENT (OUTPATIENT)
Dept: FAMILY MEDICINE | Facility: CLINIC | Age: 65
End: 2020-01-31
Payer: MEDICARE

## 2020-01-31 VITALS
DIASTOLIC BLOOD PRESSURE: 70 MMHG | BODY MASS INDEX: 26.68 KG/M2 | SYSTOLIC BLOOD PRESSURE: 110 MMHG | HEIGHT: 62 IN | OXYGEN SATURATION: 97 % | WEIGHT: 145 LBS | HEART RATE: 77 BPM

## 2020-01-31 PROCEDURE — 99214 OFFICE O/P EST MOD 30 MIN: CPT

## 2020-01-31 NOTE — HISTORY OF PRESENT ILLNESS
[FreeTextEntry1] : follow up on vit d def/anxiety/dyslipidemia  [de-identified] : follow up on vit d def/anxiety/dyslipidemia \par \par as above feels well.  no current complaints

## 2020-01-31 NOTE — PHYSICAL EXAM
[No Acute Distress] : no acute distress [Well Nourished] : well nourished [Well Developed] : well developed [EOMI] : extraocular movements intact [Well-Appearing] : well-appearing [Normal Outer Ear/Nose] : the outer ears and nose were normal in appearance [No Lymphadenopathy] : no lymphadenopathy [No JVD] : no jugular venous distention [No Accessory Muscle Use] : no accessory muscle use [No Respiratory Distress] : no respiratory distress  [Supple] : supple [Clear to Auscultation] : lungs were clear to auscultation bilaterally [Normal Rate] : normal rate  [Regular Rhythm] : with a regular rhythm [Normal S1, S2] : normal S1 and S2 [Soft] : abdomen soft [No Edema] : there was no peripheral edema [No Carotid Bruits] : no carotid bruits [No Masses] : no abdominal mass palpated [Non-distended] : non-distended [Non Tender] : non-tender [No CVA Tenderness] : no CVA  tenderness [No HSM] : no HSM [Normal Bowel Sounds] : normal bowel sounds [No Rash] : no rash [Grossly Normal Strength/Tone] : grossly normal strength/tone [No Focal Deficits] : no focal deficits [Coordination Grossly Intact] : coordination grossly intact [Normal Gait] : normal gait [Normal Affect] : the affect was normal [Normal Mood] : the mood was normal [Normal Insight/Judgement] : insight and judgment were intact

## 2020-02-25 ENCOUNTER — APPOINTMENT (OUTPATIENT)
Dept: OBGYN | Facility: CLINIC | Age: 65
End: 2020-02-25
Payer: MEDICARE

## 2020-02-25 VITALS
WEIGHT: 144 LBS | HEIGHT: 60 IN | DIASTOLIC BLOOD PRESSURE: 70 MMHG | BODY MASS INDEX: 28.27 KG/M2 | SYSTOLIC BLOOD PRESSURE: 112 MMHG

## 2020-02-25 DIAGNOSIS — R87.612 LOW GRADE SQUAMOUS INTRAEPITHELIAL LESION ON CYTOLOGIC SMEAR OF CERVIX (LGSIL): ICD-10-CM

## 2020-02-25 DIAGNOSIS — N95.2 POSTMENOPAUSAL ATROPHIC VAGINITIS: ICD-10-CM

## 2020-02-25 PROCEDURE — 57421 EXAM/BIOPSY OF VAG W/SCOPE: CPT

## 2020-02-25 NOTE — PROCEDURE
[Colposcopy] : colposcopy [LGSIL] : low grade squamous intraepithelial lesion [Patient] : patient [Risks] : risks [Benefits] : benefits [Alternatives] : alternatives [Infection] : infection [Bleeding] : bleeding [Allergic Reaction] : allergic reaction [Consent Obtained] : written consent was obtained prior to the procedure [Normal Staining] : normal staining [No Abnormalities] : no abnormalities seen [Biopsies Taken: # ___] : [unfilled] biopsies taken of the cervix [Biopsy Locations ___ o'clock] : the biopsies were taken at [unfilled] o'clock [ECC Done] : Endocervical curettage was not performed. [John's] : John's solution [Direct Pressure] : direct pressure [Tolerated Well] : the patient tolerated the procedure well [No Complications] : there were no complications

## 2020-02-25 NOTE — HISTORY OF PRESENT ILLNESS
[Last Mammogram ___] : Last Mammogram was [unfilled] [Last Pap ___] : Last cervical pap smear was [unfilled] [Postmenopausal] : is postmenopausal [Total Preg ___] : [unfilled] [Pregnancy History] : pregnancy history: [Full Term ___] : [unfilled] [AB Spont ___] : miscarriages: [unfilled] [Living ___] : [unfilled] [Menarche Age: ____] : age at menarche was [unfilled] [Definite:  ___ (Date)] : the last menstrual period was [unfilled] [Sexually Active] : is sexually active [Monogamous] : is monogamous [Male ___] : [unfilled] male [Menstrual Problems] : reports normal menses [Contraception] : does not use contraception

## 2020-03-14 LAB — CORE LAB BIOPSY: NORMAL

## 2020-03-16 ENCOUNTER — TRANSCRIPTION ENCOUNTER (OUTPATIENT)
Age: 65
End: 2020-03-16

## 2020-04-01 ENCOUNTER — RX RENEWAL (OUTPATIENT)
Age: 65
End: 2020-04-01

## 2020-04-04 DIAGNOSIS — N89.0 MILD VAGINAL DYSPLASIA: ICD-10-CM

## 2020-05-06 ENCOUNTER — RX CHANGE (OUTPATIENT)
Age: 65
End: 2020-05-06

## 2020-05-26 ENCOUNTER — APPOINTMENT (OUTPATIENT)
Dept: FAMILY MEDICINE | Facility: CLINIC | Age: 65
End: 2020-05-26
Payer: MEDICARE

## 2020-05-26 ENCOUNTER — LABORATORY RESULT (OUTPATIENT)
Age: 65
End: 2020-05-26

## 2020-05-26 VITALS
OXYGEN SATURATION: 98 % | DIASTOLIC BLOOD PRESSURE: 70 MMHG | TEMPERATURE: 98.1 F | SYSTOLIC BLOOD PRESSURE: 102 MMHG | HEIGHT: 60 IN | WEIGHT: 145 LBS | BODY MASS INDEX: 28.47 KG/M2 | HEART RATE: 71 BPM

## 2020-05-26 PROCEDURE — 99214 OFFICE O/P EST MOD 30 MIN: CPT | Mod: 25

## 2020-05-26 PROCEDURE — 36415 COLL VENOUS BLD VENIPUNCTURE: CPT

## 2020-05-26 RX ORDER — SUMATRIPTAN AND NAPROXEN SODIUM 85; 500 MG/1; MG/1
85-500 TABLET, FILM COATED ORAL
Qty: 9 | Refills: 2 | Status: COMPLETED | COMMUNITY
Start: 2019-02-01 | End: 2020-05-26

## 2020-05-26 NOTE — PHYSICAL EXAM
[Well Nourished] : well nourished [Well Developed] : well developed [No Acute Distress] : no acute distress [EOMI] : extraocular movements intact [Well-Appearing] : well-appearing [Normal Outer Ear/Nose] : the outer ears and nose were normal in appearance [No Respiratory Distress] : no respiratory distress  [No JVD] : no jugular venous distention [No Accessory Muscle Use] : no accessory muscle use [Clear to Auscultation] : lungs were clear to auscultation bilaterally [Normal Rate] : normal rate  [Regular Rhythm] : with a regular rhythm [Normal S1, S2] : normal S1 and S2 [No Carotid Bruits] : no carotid bruits [No Edema] : there was no peripheral edema [Non Tender] : non-tender [Soft] : abdomen soft [No HSM] : no HSM [No Masses] : no abdominal mass palpated [Non-distended] : non-distended [Normal Posterior Cervical Nodes] : no posterior cervical lymphadenopathy [Normal Bowel Sounds] : normal bowel sounds [Normal Anterior Cervical Nodes] : no anterior cervical lymphadenopathy [Grossly Normal Strength/Tone] : grossly normal strength/tone [No Rash] : no rash [No CVA Tenderness] : no CVA  tenderness [Normal Gait] : normal gait [Coordination Grossly Intact] : coordination grossly intact [No Focal Deficits] : no focal deficits [Normal Mood] : the mood was normal [Normal Affect] : the affect was normal [Normal Insight/Judgement] : insight and judgment were intact

## 2020-05-26 NOTE — HISTORY OF PRESENT ILLNESS
[de-identified] : 65 yo female for 3M checkup on HLD/Asthma/KATHERINE/Hypothyroidism/Migraine H/A  (negative fast) \par \par Denies f/c/s Denies CP/SOB and/or chest tightness.  Denies loose stools  Denies loss of taste and/or smell  \par  [FreeTextEntry1] : pt f/u with HLD

## 2020-05-27 LAB
25(OH)D3 SERPL-MCNC: 36.2 NG/ML
ALBUMIN SERPL ELPH-MCNC: 4 G/DL
ALP BLD-CCNC: 84 U/L
ALT SERPL-CCNC: 16 U/L
ANION GAP SERPL CALC-SCNC: 12 MMOL/L
APPEARANCE: CLEAR
AST SERPL-CCNC: 16 U/L
BACTERIA: NEGATIVE
BASOPHILS # BLD AUTO: 0.04 K/UL
BASOPHILS NFR BLD AUTO: 0.5 %
BILIRUB SERPL-MCNC: <0.2 MG/DL
BILIRUBIN URINE: NEGATIVE
BLOOD URINE: NEGATIVE
BUN SERPL-MCNC: 16 MG/DL
CALCIUM SERPL-MCNC: 9.6 MG/DL
CHLORIDE SERPL-SCNC: 106 MMOL/L
CHOLEST SERPL-MCNC: 167 MG/DL
CHOLEST/HDLC SERPL: 3.8 RATIO
CO2 SERPL-SCNC: 25 MMOL/L
COLOR: NORMAL
CREAT SERPL-MCNC: 0.59 MG/DL
EOSINOPHIL # BLD AUTO: 0.08 K/UL
EOSINOPHIL NFR BLD AUTO: 0.9 %
ESTIMATED AVERAGE GLUCOSE: 105 MG/DL
GLUCOSE QUALITATIVE U: NEGATIVE
GLUCOSE SERPL-MCNC: 88 MG/DL
HBA1C MFR BLD HPLC: 5.3 %
HCT VFR BLD CALC: 43.1 %
HDLC SERPL-MCNC: 44 MG/DL
HGB BLD-MCNC: 13.6 G/DL
HYALINE CASTS: 3 /LPF
IMM GRANULOCYTES NFR BLD AUTO: 0.3 %
KETONES URINE: NEGATIVE
LDLC SERPL CALC-MCNC: 93 MG/DL
LEUKOCYTE ESTERASE URINE: NEGATIVE
LYMPHOCYTES # BLD AUTO: 2.61 K/UL
LYMPHOCYTES NFR BLD AUTO: 30 %
MAN DIFF?: NORMAL
MCHC RBC-ENTMCNC: 30.1 PG
MCHC RBC-ENTMCNC: 31.6 GM/DL
MCV RBC AUTO: 95.4 FL
MICROSCOPIC-UA: NORMAL
MONOCYTES # BLD AUTO: 0.45 K/UL
MONOCYTES NFR BLD AUTO: 5.2 %
NEUTROPHILS # BLD AUTO: 5.5 K/UL
NEUTROPHILS NFR BLD AUTO: 63.1 %
NITRITE URINE: NEGATIVE
PH URINE: 6.5
PLATELET # BLD AUTO: 241 K/UL
POTASSIUM SERPL-SCNC: 4.2 MMOL/L
PROT SERPL-MCNC: 7 G/DL
PROTEIN URINE: NEGATIVE
RBC # BLD: 4.52 M/UL
RBC # FLD: 13.4 %
RED BLOOD CELLS URINE: 1 /HPF
SODIUM SERPL-SCNC: 143 MMOL/L
SPECIFIC GRAVITY URINE: 1.02
SQUAMOUS EPITHELIAL CELLS: 2 /HPF
T3 SERPL-MCNC: 94 NG/DL
T4 SERPL-MCNC: 10.8 UG/DL
TRIGL SERPL-MCNC: 148 MG/DL
TSH SERPL-ACNC: 0.64 UIU/ML
URATE SERPL-MCNC: 2.9 MG/DL
UROBILINOGEN URINE: NORMAL
WBC # FLD AUTO: 8.71 K/UL
WHITE BLOOD CELLS URINE: 6 /HPF

## 2020-05-28 LAB — BACTERIA UR CULT: NORMAL

## 2020-07-08 ENCOUNTER — RX RENEWAL (OUTPATIENT)
Age: 65
End: 2020-07-08

## 2020-07-21 ENCOUNTER — NON-APPOINTMENT (OUTPATIENT)
Age: 65
End: 2020-07-21

## 2020-07-21 ENCOUNTER — APPOINTMENT (OUTPATIENT)
Dept: FAMILY MEDICINE | Facility: CLINIC | Age: 65
End: 2020-07-21
Payer: MEDICARE

## 2020-07-21 VITALS
WEIGHT: 146 LBS | BODY MASS INDEX: 28.66 KG/M2 | SYSTOLIC BLOOD PRESSURE: 112 MMHG | HEART RATE: 89 BPM | HEIGHT: 60 IN | TEMPERATURE: 98.2 F | OXYGEN SATURATION: 98 % | DIASTOLIC BLOOD PRESSURE: 70 MMHG

## 2020-07-21 DIAGNOSIS — N21.0 CALCULUS IN BLADDER: ICD-10-CM

## 2020-07-21 PROCEDURE — 99214 OFFICE O/P EST MOD 30 MIN: CPT | Mod: 25

## 2020-07-21 PROCEDURE — 93000 ELECTROCARDIOGRAM COMPLETE: CPT

## 2020-07-21 RX ORDER — ESTRADIOL 10 UG/1
10 TABLET, FILM COATED VAGINAL
Qty: 24 | Refills: 1 | Status: COMPLETED | COMMUNITY
Start: 2020-02-25 | End: 2020-07-21

## 2020-07-21 RX ORDER — ESTRADIOL 10 UG/1
10 TABLET, FILM COATED VAGINAL
Qty: 36 | Refills: 3 | Status: COMPLETED | COMMUNITY
Start: 2020-04-04 | End: 2020-07-21

## 2020-07-21 NOTE — PHYSICAL EXAM
[No Acute Distress] : no acute distress [Well Nourished] : well nourished [Well Developed] : well developed [Well-Appearing] : well-appearing [No JVD] : no jugular venous distention [No Respiratory Distress] : no respiratory distress  [Normal Outer Ear/Nose] : the outer ears and nose were normal in appearance [EOMI] : extraocular movements intact [No Accessory Muscle Use] : no accessory muscle use [Clear to Auscultation] : lungs were clear to auscultation bilaterally [Normal S1, S2] : normal S1 and S2 [Regular Rhythm] : with a regular rhythm [Normal Rate] : normal rate  [No CVA Tenderness] : no CVA  tenderness [No Carotid Bruits] : no carotid bruits [Non-distended] : non-distended [Grossly Normal Strength/Tone] : grossly normal strength/tone [No Rash] : no rash [Coordination Grossly Intact] : coordination grossly intact [Normal Gait] : normal gait [No Focal Deficits] : no focal deficits [Normal Affect] : the affect was normal [Normal Insight/Judgement] : insight and judgment were intact [Normal Mood] : the mood was normal

## 2020-07-21 NOTE — ASSESSMENT
[Patient Optimized for Surgery] : Patient optimized for surgery [ECG] : ECG [As per surgery] : as per surgery [FreeTextEntry4] : Medically optimized for proposed procedure.

## 2020-07-21 NOTE — RESULTS/DATA
[de-identified] : EKG  performed: SR at 87 bpm,. normal axis, no ST/T changes \par \par Cardiac clearance UTD 10/2019    Normal CTA of Coronaries and Normal Echo LV function

## 2020-07-21 NOTE — HISTORY OF PRESENT ILLNESS
[No Pertinent Cardiac History] : no history of aortic stenosis, atrial fibrillation, coronary artery disease, recent myocardial infarction, or implantable device/pacemaker [COPD] : COPD [Asthma] : asthma [Smoker] : smoker [No Adverse Anesthesia Reaction] : no adverse anesthesia reaction in self or family member [(Patient denies any chest pain, claudication, dyspnea on exertion, orthopnea, palpitations or syncope)] : Patient denies any chest pain, claudication, dyspnea on exertion, orthopnea, palpitations or syncope [Aortic Stenosis] : no aortic stenosis [Atrial Fibrillation] : no atrial fibrillation [Coronary Artery Disease] : no coronary artery disease [Recent Myocardial Infarction] : no recent myocardial infarction [Implantable Device/Pacemaker] : no implantable device/pacemaker [Sleep Apnea] : no sleep apnea [Chronic Anticoagulation] : no chronic anticoagulation [Chronic Kidney Disease] : no chronic kidney disease [FreeTextEntry1] : lithotripsy  [FreeTextEntry2] : 07/28/2020 [FreeTextEntry3] : Dr.Jeffrey Moreira  [FreeTextEntry4] : 65 yo female scheduled for ESWL on 7/28/20 secondary to bladder calculus at L UV junction c Dr. Moreira.   Denies f/c/s  Denies cough no SOB \par no CP or SOB c activity no dizziness no palpitations  no N/V/D +BM qd NL  no bloody/black stools  +ve dysuria secondary to bladder calculus \par \par NO hx of adverse reaction to anesthesia \par POSITIVE Shellfish allergy \par NO hx of blood tranfusion \par NO dentures  [FreeTextEntry6] : +ve smoker 5-6 cigs/day

## 2020-09-09 ENCOUNTER — MED ADMIN CHARGE (OUTPATIENT)
Age: 65
End: 2020-09-09

## 2020-09-09 ENCOUNTER — APPOINTMENT (OUTPATIENT)
Dept: FAMILY MEDICINE | Facility: CLINIC | Age: 65
End: 2020-09-09
Payer: MEDICARE

## 2020-09-09 VITALS
WEIGHT: 145 LBS | HEIGHT: 60 IN | HEART RATE: 85 BPM | BODY MASS INDEX: 28.47 KG/M2 | OXYGEN SATURATION: 98 % | DIASTOLIC BLOOD PRESSURE: 70 MMHG | SYSTOLIC BLOOD PRESSURE: 122 MMHG | TEMPERATURE: 98.5 F

## 2020-09-09 DIAGNOSIS — R05 COUGH: ICD-10-CM

## 2020-09-09 DIAGNOSIS — R51 HEADACHE: ICD-10-CM

## 2020-09-09 PROCEDURE — 90686 IIV4 VACC NO PRSV 0.5 ML IM: CPT

## 2020-09-09 PROCEDURE — G0009: CPT

## 2020-09-09 PROCEDURE — 90732 PPSV23 VACC 2 YRS+ SUBQ/IM: CPT

## 2020-09-09 PROCEDURE — 90472 IMMUNIZATION ADMIN EACH ADD: CPT

## 2020-09-09 PROCEDURE — 99214 OFFICE O/P EST MOD 30 MIN: CPT | Mod: 25

## 2020-09-09 NOTE — HISTORY OF PRESENT ILLNESS
[FreeTextEntry1] : pt. f/u with allergies  [de-identified] : 65 yo female c recurrent dry cough, more frequent in am over the last 2 weeks c intermittent wheeze.  Denies associated f/c/s \par +ve smoker  4-5 cigs/day .     Denies CP and/or chest pressure     Reports relief c OTC Sinus Tabs and Tessalon Perles 200mg qd- qod

## 2020-09-09 NOTE — PHYSICAL EXAM
[No Acute Distress] : no acute distress [Well Nourished] : well nourished [Well-Appearing] : well-appearing [Well Developed] : well developed [Normal Outer Ear/Nose] : the outer ears and nose were normal in appearance [EOMI] : extraocular movements intact [No JVD] : no jugular venous distention [No Respiratory Distress] : no respiratory distress  [No Accessory Muscle Use] : no accessory muscle use [Clear to Auscultation] : lungs were clear to auscultation bilaterally [Regular Rhythm] : with a regular rhythm [Normal Rate] : normal rate  [No Carotid Bruits] : no carotid bruits [Normal S1, S2] : normal S1 and S2 [No Edema] : there was no peripheral edema [Non-distended] : non-distended [Normal Posterior Cervical Nodes] : no posterior cervical lymphadenopathy [No CVA Tenderness] : no CVA  tenderness [Normal Anterior Cervical Nodes] : no anterior cervical lymphadenopathy [Grossly Normal Strength/Tone] : grossly normal strength/tone [Coordination Grossly Intact] : coordination grossly intact [No Rash] : no rash [No Focal Deficits] : no focal deficits [Normal Affect] : the affect was normal [Normal Gait] : normal gait [Normal Insight/Judgement] : insight and judgment were intact [Normal Mood] : the mood was normal

## 2020-09-22 ENCOUNTER — RX RENEWAL (OUTPATIENT)
Age: 65
End: 2020-09-22

## 2020-11-02 ENCOUNTER — APPOINTMENT (OUTPATIENT)
Dept: FAMILY MEDICINE | Facility: CLINIC | Age: 65
End: 2020-11-02
Payer: MEDICARE

## 2020-11-02 VITALS
DIASTOLIC BLOOD PRESSURE: 78 MMHG | TEMPERATURE: 98 F | HEART RATE: 64 BPM | WEIGHT: 145 LBS | OXYGEN SATURATION: 96 % | BODY MASS INDEX: 28.47 KG/M2 | SYSTOLIC BLOOD PRESSURE: 110 MMHG | HEIGHT: 60 IN

## 2020-11-02 DIAGNOSIS — M54.5 LOW BACK PAIN: ICD-10-CM

## 2020-11-02 DIAGNOSIS — G89.29 LOW BACK PAIN: ICD-10-CM

## 2020-11-02 PROCEDURE — 99072 ADDL SUPL MATRL&STAF TM PHE: CPT

## 2020-11-02 PROCEDURE — 99214 OFFICE O/P EST MOD 30 MIN: CPT

## 2020-11-02 RX ORDER — PHENAZOPYRIDINE HYDROCHLORIDE 200 MG/1
200 TABLET ORAL
Qty: 30 | Refills: 0 | Status: COMPLETED | COMMUNITY
Start: 2020-09-22

## 2020-11-02 RX ORDER — NITROFURANTOIN (MONOHYDRATE/MACROCRYSTALS) 25; 75 MG/1; MG/1
100 CAPSULE ORAL
Qty: 14 | Refills: 0 | Status: COMPLETED | COMMUNITY
Start: 2020-09-22

## 2020-11-02 NOTE — HISTORY OF PRESENT ILLNESS
[FreeTextEntry1] : Follow up on bed bug bites and c/o wheezing for 2 weeks [de-identified] : 64 yo  female for eval  secondary to > 1M hx of intermittent wheezing and runny nose  c clear d/c.    +ve mild frontal sinus congestion \par \par no fever no sweats  +ve intermittent chills    mild "tickle" cough  occurring intermittently at best    intermittent L ear discomfort  \par \par \par pt also is s/p bed bug infection initially suffered on 9/13/20.   Today still c intermittent pruritic sensation \par \par

## 2020-11-02 NOTE — PHYSICAL EXAM
[No Acute Distress] : no acute distress [Well Nourished] : well nourished [Well Developed] : well developed [Well-Appearing] : well-appearing [EOMI] : extraocular movements intact [Normal Outer Ear/Nose] : the outer ears and nose were normal in appearance [No JVD] : no jugular venous distention [No Lymphadenopathy] : no lymphadenopathy [Supple] : supple [No Respiratory Distress] : no respiratory distress  [No Accessory Muscle Use] : no accessory muscle use [Clear to Auscultation] : lungs were clear to auscultation bilaterally [Normal Rate] : normal rate  [Normal S1, S2] : normal S1 and S2 [No Carotid Bruits] : no carotid bruits [No Edema] : there was no peripheral edema [Non-distended] : non-distended [No CVA Tenderness] : no CVA  tenderness [Grossly Normal Strength/Tone] : grossly normal strength/tone [Coordination Grossly Intact] : coordination grossly intact [No Focal Deficits] : no focal deficits [Normal Gait] : normal gait [Normal Affect] : the affect was normal [Normal Mood] : the mood was normal [Normal Insight/Judgement] : insight and judgment were intact [de-identified] : +ve sporadic < 5cm hyperpigmented macular lesions LE B/L, UE B/L,  mid upper back, and upper anterior neck

## 2020-11-17 ENCOUNTER — APPOINTMENT (OUTPATIENT)
Dept: OBGYN | Facility: CLINIC | Age: 65
End: 2020-11-17
Payer: MEDICARE

## 2020-11-17 VITALS
DIASTOLIC BLOOD PRESSURE: 64 MMHG | WEIGHT: 145 LBS | HEIGHT: 60 IN | SYSTOLIC BLOOD PRESSURE: 110 MMHG | TEMPERATURE: 97.3 F | BODY MASS INDEX: 28.47 KG/M2

## 2020-11-17 DIAGNOSIS — R10.31 RIGHT LOWER QUADRANT PAIN: ICD-10-CM

## 2020-11-17 LAB
BILIRUB UR QL STRIP: NORMAL
GLUCOSE UR-MCNC: NORMAL
HCG UR QL: 0.2 EU/DL
HGB UR QL STRIP.AUTO: ABNORMAL
KETONES UR-MCNC: NORMAL
LEUKOCYTE ESTERASE UR QL STRIP: NORMAL
NITRITE UR QL STRIP: NORMAL
PH UR STRIP: 5.5
PROT UR STRIP-MCNC: NORMAL
SP GR UR STRIP: 1.03

## 2020-11-17 PROCEDURE — 81003 URINALYSIS AUTO W/O SCOPE: CPT | Mod: QW

## 2020-11-17 PROCEDURE — 99213 OFFICE O/P EST LOW 20 MIN: CPT

## 2020-11-17 NOTE — REVIEW OF SYSTEMS
[Patient Intake Form Reviewed] : Patient intake form was reviewed [Negative] : Heme/Lymph [Abdominal Pain] : abdominal pain [Urgency] : urgency

## 2020-11-17 NOTE — PHYSICAL EXAM
[Appropriately responsive] : appropriately responsive [Alert] : alert [No Acute Distress] : no acute distress [Soft] : soft [Non-distended] : non-distended [No Mass] : no mass [Oriented x3] : oriented x3 [Vulvar Atrophy] : vulvar atrophy [Labia Majora] : normal [Labia Minora] : normal [Atrophy] : atrophy [Pink Rugae] : pink rugae [Discharge] : a  ~M vaginal discharge was present [Scant] : scant [White] : white [Thick] : thick [No Bleeding] : There was no active vaginal bleeding [Absent] : absent [Uterine Adnexae] : absent [FreeTextEntry7] : Abd: Soft, non-distended. Mild TTP in suprapubic and RLQ. No rebound tenderness. No guarding. No acute abdomen. [Foul Smelling] : not foul smelling [FreeTextEntry4] : Vaginal cuff- normal appearing. [FreeTextEntry6] : Suprapubic tenderness to palpation on bimanual exam.

## 2020-11-17 NOTE — DISCUSSION/SUMMARY
[FreeTextEntry1] : -Vaginal discharge- Affirm collected to r/o vaginitis. Likely candidiasis. Patient desires to be treated prior to receiving results. Rx Terazol x 3 days was prescribed.\par \par -Urinary frequency- U/A appears negative for UTI. Microscopic hematuria noted- she has h/o nephrolithiasis. U/A and urine culture ordered.\par \par -Intermittent RLQ pain x 2 months- On exam today, mild TTP in suprapubic and RLQ regions. No acute abdomen. She has h/o KATIE and BSO. Recommended that she follow up with PCP to r/o non-GYN etiologies for her pain.\par \par -Follow up within 1 month for annual GYN Exam.

## 2020-11-17 NOTE — REASON FOR VISIT
[Acute] : an acute visit for [Pelvic Pain] : pelvic pain [Urinary Complaints] : urinary complaints [Vulvar/Vaginal Complaint] : vulvar/vaginal complaint [FreeTextEntry2] : vaginal pain and discomfort

## 2020-11-17 NOTE — HISTORY OF PRESENT ILLNESS
[Patient reported mammogram was normal] : Patient reported mammogram was normal [Patient reported PAP Smear was abnormal] : Patient reported PAP Smear was abnormal [N] : Patient does not use contraception [Y] : Positive pregnancy history [Localized] : localized [Vagina] : vagina [Chronic] : chronic [Menarche Age: ____] : age at menarche was [unfilled] [Currently In Menopause] : currently in menopause [Currently Active] : currently active [Men] : men [Vaginal] : vaginal [No] : No [Mammogramdate] : 09/26/19 [PapSmeardate] : 09/20/19 LSIL +HPV [TextBox_31] : HARPER 02/25/20 [LMPDate] : 1998 [PGxTotal] : 6 [[facility] : 5 [PGHxABSpont] : 1 [FreeTextEntry1] : 1998

## 2020-11-19 ENCOUNTER — NON-APPOINTMENT (OUTPATIENT)
Age: 65
End: 2020-11-19

## 2020-11-19 LAB
APPEARANCE: CLEAR
BACTERIA UR CULT: NORMAL
BACTERIA: NEGATIVE
BILIRUBIN URINE: NEGATIVE
BLOOD URINE: NEGATIVE
CANDIDA VAG CYTO: DETECTED
COLOR: YELLOW
G VAGINALIS+PREV SP MTYP VAG QL MICRO: NOT DETECTED
GLUCOSE QUALITATIVE U: NEGATIVE
HYALINE CASTS: 3 /LPF
KETONES URINE: NEGATIVE
LEUKOCYTE ESTERASE URINE: NEGATIVE
MICROSCOPIC-UA: NORMAL
NITRITE URINE: NEGATIVE
PH URINE: 5.5
PROTEIN URINE: NEGATIVE
RED BLOOD CELLS URINE: 1 /HPF
SPECIFIC GRAVITY URINE: 1.02
SQUAMOUS EPITHELIAL CELLS: 1 /HPF
T VAGINALIS VAG QL WET PREP: NOT DETECTED
UROBILINOGEN URINE: NORMAL
WHITE BLOOD CELLS URINE: 4 /HPF

## 2020-12-09 ENCOUNTER — APPOINTMENT (OUTPATIENT)
Dept: FAMILY MEDICINE | Facility: CLINIC | Age: 65
End: 2020-12-09

## 2020-12-16 ENCOUNTER — APPOINTMENT (OUTPATIENT)
Dept: OBGYN | Facility: CLINIC | Age: 65
End: 2020-12-16
Payer: MEDICARE

## 2020-12-16 VITALS
HEIGHT: 60 IN | DIASTOLIC BLOOD PRESSURE: 60 MMHG | SYSTOLIC BLOOD PRESSURE: 110 MMHG | BODY MASS INDEX: 28.66 KG/M2 | WEIGHT: 146 LBS

## 2020-12-16 DIAGNOSIS — Z78.9 OTHER SPECIFIED HEALTH STATUS: ICD-10-CM

## 2020-12-16 DIAGNOSIS — Z01.419 ENCOUNTER FOR GYNECOLOGICAL EXAMINATION (GENERAL) (ROUTINE) W/OUT ABNORMAL FINDINGS: ICD-10-CM

## 2020-12-16 DIAGNOSIS — Z80.3 FAMILY HISTORY OF MALIGNANT NEOPLASM OF BREAST: ICD-10-CM

## 2020-12-16 PROCEDURE — XXXXX: CPT

## 2020-12-16 RX ORDER — CETIRIZINE HYDROCHLORIDE 10 MG/1
TABLET, FILM COATED ORAL
Refills: 0 | Status: ACTIVE | COMMUNITY

## 2020-12-16 RX ORDER — PERMETHRIN 50 MG/G
5 CREAM TOPICAL
Qty: 1 | Refills: 1 | Status: DISCONTINUED | COMMUNITY
Start: 2020-10-02 | End: 2020-12-16

## 2020-12-16 RX ORDER — ALBUTEROL SULFATE 90 UG/1
108 (90 BASE) AEROSOL, METERED RESPIRATORY (INHALATION)
Qty: 1 | Refills: 1 | Status: ACTIVE | COMMUNITY
Start: 2018-06-12

## 2020-12-16 RX ORDER — ALBUTEROL SULFATE 2.5 MG/3ML
(2.5 MG/3ML) SOLUTION RESPIRATORY (INHALATION)
Qty: 75 | Refills: 0 | Status: ACTIVE | COMMUNITY
Start: 2018-02-15

## 2020-12-16 RX ORDER — TERCONAZOLE 8 MG/G
0.8 CREAM VAGINAL
Qty: 1 | Refills: 0 | Status: DISCONTINUED | COMMUNITY
Start: 2020-11-17 | End: 2020-12-16

## 2020-12-16 NOTE — PHYSICAL EXAM
[Appropriately responsive] : appropriately responsive [Alert] : alert [No Acute Distress] : no acute distress [Soft] : soft [Non-tender] : non-tender [Non-distended] : non-distended [No Mass] : no mass [Oriented x3] : oriented x3 [Examination Of The Breasts] : a normal appearance [Normal] : normal [No Discharge] : no discharge [No Masses] : no breast masses were palpable [Vulvar Atrophy] : vulvar atrophy [Labia Majora] : normal [Labia Minora] : normal [Atrophy] : atrophy [Pink Rugae] : pink rugae [No Bleeding] : There was no active vaginal bleeding [Absent] : absent [Uterine Adnexae] : absent [No Tenderness] : no tenderness [FreeTextEntry9] : FOBT negative

## 2020-12-16 NOTE — HISTORY OF PRESENT ILLNESS
[Patient reported mammogram was normal] : Patient reported mammogram was normal [Patient reported PAP Smear was abnormal] : Patient reported PAP Smear was abnormal [LMP unknown] : LMP unknown [N] : Patient does not use contraception [Y] : Patient is sexually active [Monogamous (Male Partner)] : is monogamous with a male partner [Currently Active] : currently active [Men] : men [Vaginal] : vaginal [No] : No [FreeTextEntry1] : Ms. ROBERTO presents for her annual well woman visit. \par \par She is doing well. She denies having any abdominal pain, vaginal bleeding, or vaginal discharge. \par \par She has h/o KATIE and BSO in 1992 for "pre-cancerous" cells of the uterus. She also had h/o VAIN 1 on vaginal colposcopy in 2/2020.\par \par She has been having intermittent vaginal discomfort/itching since 9/2020. She was last diagnosed with  vaginal candidiasis in 11/2020.\par \par She desires a breast examination. No breast complaints. [Mammogramdate] : 09/26/19 [PapSmeardate] : 09/20/19 [PGHxTotal] : 5 [[facility] 4 [[facility]] : 4 [PGHxABSpont] : 1

## 2020-12-16 NOTE — HISTORY OF PRESENT ILLNESS
[Patient reported mammogram was normal] : Patient reported mammogram was normal [Patient reported PAP Smear was abnormal] : Patient reported PAP Smear was abnormal [LMP unknown] : LMP unknown [N] : Patient does not use contraception [Y] : Patient is sexually active [Monogamous (Male Partner)] : is monogamous with a male partner [Currently Active] : currently active [Men] : men [Vaginal] : vaginal [No] : No [FreeTextEntry1] : Ms. ROBERTO presents for her annual well woman visit. \par \par She is doing well. She denies having any abdominal pain, vaginal bleeding, or vaginal discharge. \par \par She has h/o KATIE and BSO in 1992 for "pre-cancerous" cells of the uterus. She also had h/o VAIN 1 on vaginal colposcopy in 2/2020.\par \par She has been having intermittent vaginal discomfort/itching since 9/2020. She was last diagnosed with  vaginal candidiasis in 11/2020.\par \par She desires a breast examination. No breast complaints. [Mammogramdate] : 09/26/19 [PapSmeardate] : 09/20/19 [PGHxTotal] : 5 [[facility]] : 4 [[facility]] : 4 [PGHxABSpont] : 1

## 2020-12-21 PROBLEM — Z87.09 HISTORY OF ACUTE SINUSITIS: Status: RESOLVED | Noted: 2018-08-07 | Resolved: 2020-12-21

## 2021-01-05 ENCOUNTER — NON-APPOINTMENT (OUTPATIENT)
Age: 66
End: 2021-01-05

## 2021-01-05 LAB
CYTOLOGY CVX/VAG DOC THIN PREP: ABNORMAL
HPV HIGH+LOW RISK DNA PNL CVX: NOT DETECTED

## 2021-02-17 ENCOUNTER — APPOINTMENT (OUTPATIENT)
Dept: OBGYN | Facility: CLINIC | Age: 66
End: 2021-02-17
Payer: MEDICARE

## 2021-02-17 VITALS
TEMPERATURE: 97.2 F | BODY MASS INDEX: 28.86 KG/M2 | WEIGHT: 147 LBS | SYSTOLIC BLOOD PRESSURE: 122 MMHG | HEIGHT: 60 IN | DIASTOLIC BLOOD PRESSURE: 84 MMHG

## 2021-02-17 LAB
BILIRUB UR QL STRIP: NORMAL
CLARITY UR: CLEAR
COLLECTION METHOD: NORMAL
GLUCOSE UR-MCNC: NORMAL
HCG UR QL: 0.2 EU/DL
HCG UR QL: NEGATIVE
HGB UR QL STRIP.AUTO: NORMAL
KETONES UR-MCNC: NORMAL
LEUKOCYTE ESTERASE UR QL STRIP: NORMAL
NITRITE UR QL STRIP: NORMAL
PH UR STRIP: 6
PROT UR STRIP-MCNC: NORMAL
QUALITY CONTROL: YES
SP GR UR STRIP: 1.02

## 2021-02-17 PROCEDURE — 57421 EXAM/BIOPSY OF VAG W/SCOPE: CPT

## 2021-02-17 PROCEDURE — 99072 ADDL SUPL MATRL&STAF TM PHE: CPT

## 2021-02-17 PROCEDURE — 81003 URINALYSIS AUTO W/O SCOPE: CPT | Mod: QW

## 2021-02-17 RX ORDER — NAPROXEN 500 MG/1
500 TABLET ORAL
Qty: 60 | Refills: 3 | Status: DISCONTINUED | COMMUNITY
End: 2021-02-17

## 2021-03-02 ENCOUNTER — APPOINTMENT (OUTPATIENT)
Dept: FAMILY MEDICINE | Facility: CLINIC | Age: 66
End: 2021-03-02
Payer: MEDICARE

## 2021-03-02 VITALS
HEART RATE: 73 BPM | HEIGHT: 60 IN | BODY MASS INDEX: 29.25 KG/M2 | WEIGHT: 149 LBS | TEMPERATURE: 97.8 F | DIASTOLIC BLOOD PRESSURE: 68 MMHG | SYSTOLIC BLOOD PRESSURE: 118 MMHG | OXYGEN SATURATION: 98 %

## 2021-03-02 DIAGNOSIS — M76.31 ILIOTIBIAL BAND SYNDROME, RIGHT LEG: ICD-10-CM

## 2021-03-02 DIAGNOSIS — R09.82 POSTNASAL DRIP: ICD-10-CM

## 2021-03-02 PROCEDURE — 99214 OFFICE O/P EST MOD 30 MIN: CPT

## 2021-03-02 PROCEDURE — 99072 ADDL SUPL MATRL&STAF TM PHE: CPT

## 2021-03-02 RX ORDER — AZELASTINE HYDROCHLORIDE AND FLUTICASONE PROPIONATE 137; 50 UG/1; UG/1
137-50 SPRAY, METERED NASAL
Qty: 1 | Refills: 2 | Status: ACTIVE | COMMUNITY
Start: 2021-03-02 | End: 1900-01-01

## 2021-03-02 NOTE — PHYSICAL EXAM
[No Acute Distress] : no acute distress [Well Nourished] : well nourished [Well Developed] : well developed [Well-Appearing] : well-appearing [EOMI] : extraocular movements intact [Normal Outer Ear/Nose] : the outer ears and nose were normal in appearance [No JVD] : no jugular venous distention [No Respiratory Distress] : no respiratory distress  [No Accessory Muscle Use] : no accessory muscle use [Clear to Auscultation] : lungs were clear to auscultation bilaterally [Normal Rate] : normal rate  [Regular Rhythm] : with a regular rhythm [Normal S1, S2] : normal S1 and S2 [No Carotid Bruits] : no carotid bruits [No Edema] : there was no peripheral edema [No CVA Tenderness] : no CVA  tenderness [No Rash] : no rash [Coordination Grossly Intact] : coordination grossly intact [No Focal Deficits] : no focal deficits [Normal Gait] : normal gait [Normal Affect] : the affect was normal [Normal Mood] : the mood was normal [Normal Insight/Judgement] : insight and judgment were intact [de-identified] : +ve tenderness along R IT band

## 2021-03-02 NOTE — PLAN
[FreeTextEntry1] : d/c Zyrtec, cont Singulair  \par OTC Allegra 180mg qd  \par see med orders \par \par see referral \par \par Recommend COVID-19 vaccination secondary to hx of Asthma and mild COPD.

## 2021-03-02 NOTE — HISTORY OF PRESENT ILLNESS
[FreeTextEntry1] : Pt c/o PND for 2 months [de-identified] : 64 yo female c  6 week hx of PND, voice hoarseness.  \par as above despite OTC Zyrtec and Rx Singulair.  OTC Advil Cold &Sinus and symptoms persist.  +ve nasal congestion \par pt will NOT use nasal sprays

## 2021-03-03 ENCOUNTER — NON-APPOINTMENT (OUTPATIENT)
Age: 66
End: 2021-03-03

## 2021-03-03 LAB — CORE LAB BIOPSY: NORMAL

## 2021-03-08 ENCOUNTER — RX RENEWAL (OUTPATIENT)
Age: 66
End: 2021-03-08

## 2021-05-03 ENCOUNTER — RX RENEWAL (OUTPATIENT)
Age: 66
End: 2021-05-03

## 2021-06-22 ENCOUNTER — APPOINTMENT (OUTPATIENT)
Dept: FAMILY MEDICINE | Facility: CLINIC | Age: 66
End: 2021-06-22

## 2021-06-28 ENCOUNTER — APPOINTMENT (OUTPATIENT)
Dept: FAMILY MEDICINE | Facility: CLINIC | Age: 66
End: 2021-06-28

## 2021-07-03 NOTE — DISCUSSION/SUMMARY
[FreeTextEntry1] : -Colposcopy done today for pap: ASCUS, HPV HR negative. She had h/o VAIN 1 on prior colposcopy.  Procedure details, r/b/a were discussed. Consent was signed. Please see procedure details above.\par \par -Pelvic rest x 2 weeks discussed. Post procedure expectations and call parameters were reviewed.\par \par -Follow up in 2 weeks.

## 2021-07-03 NOTE — HISTORY OF PRESENT ILLNESS
[postmenopausal] : postmenopausal [N] : Patient does not use contraception [Y] : Patient is sexually active [Menarche Age: ____] : age at menarche was [unfilled] [Post-Menopause, No Sxs] : post-menopausal, currently without symptoms [Currently Active] : currently active [Men] : men [Vaginal] : vaginal [No] : No [Patient refuses STI testing] : Patient refuses STI testing [TextBox_4] : PT STATES COMING IN FOR COLPO [Mammogramdate] : 9/26/19 [TextBox_19] : BR2 [PapSmeardate] : 12/16/2020 [ColonoscopyDate] : 1/28/2016 [TextBox_31] : ASC-US [HPVDate] : 12/16/2020 [TextBox_78] : NEG [LMPDate] : 1992 [PGHxTotal] : 5 [[facility]] : 4 [[facility]] : 4 [PGHxABSpont] : 1 [TextBox_6] : 1992 [TextBox_9] : 12 [FreeTextEntry1] : 1992

## 2021-07-03 NOTE — END OF VISIT
[FreeTextEntry3] : I, Leny Sainz, solely acted as scribe for Dr. Gabrielle Melvin on 02/17/2021.\par All medical entries made by the Scribe were at my, Dr. Melvin's, direction and personally dictated by me on 02/17/2021. I have reviewed the chart and agree that the record accurately reflects my personal performance of the history, physical exam, assessment and plan. I have also personally directed, reviewed, and agreed with the chart.

## 2021-07-03 NOTE — PROCEDURE
[Colposcopy] : Colposcopy  [Consent Obtained] : Consent obtained [Risks] : risks [Benefits] : benefits [Alternatives] : alternatives [Patient] : patient [Infection] : infection [Bleeding] : bleeding [Allergic Reaction] : allergic reaction [ASCUS] : ASCUS [Lesion] : lesion seen [Biopsy] : biopsy taken [Hemostasis Obtained] : Hemostasis obtained [Tolerated Well] : the patient tolerated the procedure well [Time out performed] : Pre-procedure time out performed.  Patient's name, date of birth and procedure confirmed. [ECC Performed] : ECC not performed [No Abnormalities] : no abnormalities [de-identified] : h/o VAIN 1 on prior colposcopy [de-identified] : Acetowhite area noted at 1 o'clock of vagina after application of Acetic acid. No staining area after application of Lugol's solution. [de-identified] : 1 o'clock of vagina [de-identified] : with direct pressure.

## 2021-08-05 ENCOUNTER — APPOINTMENT (OUTPATIENT)
Dept: FAMILY MEDICINE | Facility: CLINIC | Age: 66
End: 2021-08-05
Payer: MEDICARE

## 2021-08-05 VITALS
WEIGHT: 152 LBS | BODY MASS INDEX: 29.84 KG/M2 | TEMPERATURE: 98.4 F | DIASTOLIC BLOOD PRESSURE: 68 MMHG | HEIGHT: 60 IN | SYSTOLIC BLOOD PRESSURE: 122 MMHG | OXYGEN SATURATION: 98 % | HEART RATE: 78 BPM

## 2021-08-05 VITALS — DIASTOLIC BLOOD PRESSURE: 65 MMHG | SYSTOLIC BLOOD PRESSURE: 92 MMHG

## 2021-08-05 DIAGNOSIS — M25.50 PAIN IN UNSPECIFIED JOINT: ICD-10-CM

## 2021-08-05 PROCEDURE — 99214 OFFICE O/P EST MOD 30 MIN: CPT

## 2021-08-05 NOTE — HISTORY OF PRESENT ILLNESS
[FreeTextEntry1] : F/u appt. for anxiety/HLD/hypothyroidism  [de-identified] : 64 yo female for f/u on Hypothyroidism, KATHERINE, and Asthma \par \par as above,  no CP/SOB c activity no dizziness no palpitations \par no N/V/D +BM qd NL no bloody/black stools no urinary complaints

## 2021-09-14 ENCOUNTER — APPOINTMENT (OUTPATIENT)
Dept: FAMILY MEDICINE | Facility: CLINIC | Age: 66
End: 2021-09-14
Payer: MEDICARE

## 2021-09-14 VITALS
BODY MASS INDEX: 29.84 KG/M2 | WEIGHT: 152 LBS | OXYGEN SATURATION: 96 % | SYSTOLIC BLOOD PRESSURE: 100 MMHG | HEART RATE: 61 BPM | TEMPERATURE: 98.2 F | DIASTOLIC BLOOD PRESSURE: 62 MMHG | HEIGHT: 60 IN

## 2021-09-14 DIAGNOSIS — R70.0 ELEVATED ERYTHROCYTE SEDIMENTATION RATE: ICD-10-CM

## 2021-09-14 PROCEDURE — G0008: CPT

## 2021-09-14 PROCEDURE — 90662 IIV NO PRSV INCREASED AG IM: CPT

## 2021-09-14 PROCEDURE — 99214 OFFICE O/P EST MOD 30 MIN: CPT | Mod: 25

## 2021-09-14 NOTE — PLAN
[FreeTextEntry1] : start OTC Vit D 2K U daily  \par repeat 2M\par \par elevated ESR repeat 2M \par \par see immunization orders

## 2021-09-14 NOTE — PHYSICAL EXAM
[No Acute Distress] : no acute distress [Well Nourished] : well nourished [Well Developed] : well developed [Well-Appearing] : well-appearing [EOMI] : extraocular movements intact [Normal Outer Ear/Nose] : the outer ears and nose were normal in appearance [No JVD] : no jugular venous distention [No Respiratory Distress] : no respiratory distress  [No Accessory Muscle Use] : no accessory muscle use [Clear to Auscultation] : lungs were clear to auscultation bilaterally [Normal Rate] : normal rate  [Regular Rhythm] : with a regular rhythm [Normal S1, S2] : normal S1 and S2 [No Carotid Bruits] : no carotid bruits [No Edema] : there was no peripheral edema [Soft] : abdomen soft [Non Tender] : non-tender [Non-distended] : non-distended [No HSM] : no HSM [Normal Bowel Sounds] : normal bowel sounds [No CVA Tenderness] : no CVA  tenderness [Grossly Normal Strength/Tone] : grossly normal strength/tone [No Rash] : no rash [Coordination Grossly Intact] : coordination grossly intact [No Focal Deficits] : no focal deficits [Normal Gait] : normal gait [Normal Affect] : the affect was normal [Normal Mood] : the mood was normal [Normal Insight/Judgement] : insight and judgment were intact

## 2021-09-14 NOTE — HISTORY OF PRESENT ILLNESS
[FreeTextEntry1] : Follow HLD/Hypothyroid/ Anxiety [de-identified] : 67 yo female for f/u on HLD/Vit D def. \par \par as above  feels well \par \par Request flu vaccine \par \par Denies f/c/s

## 2021-10-05 ENCOUNTER — APPOINTMENT (OUTPATIENT)
Dept: FAMILY MEDICINE | Facility: CLINIC | Age: 66
End: 2021-10-05
Payer: MEDICARE

## 2021-10-05 ENCOUNTER — NON-APPOINTMENT (OUTPATIENT)
Age: 66
End: 2021-10-05

## 2021-10-05 VITALS
HEART RATE: 64 BPM | DIASTOLIC BLOOD PRESSURE: 70 MMHG | WEIGHT: 152 LBS | OXYGEN SATURATION: 95 % | TEMPERATURE: 98.2 F | SYSTOLIC BLOOD PRESSURE: 118 MMHG | HEIGHT: 60 IN | BODY MASS INDEX: 29.84 KG/M2

## 2021-10-05 VITALS — DIASTOLIC BLOOD PRESSURE: 65 MMHG | SYSTOLIC BLOOD PRESSURE: 102 MMHG

## 2021-10-05 PROCEDURE — 99215 OFFICE O/P EST HI 40 MIN: CPT | Mod: 25

## 2021-10-05 PROCEDURE — 93000 ELECTROCARDIOGRAM COMPLETE: CPT

## 2021-10-05 NOTE — PLAN
[FreeTextEntry1] : EKG performed:  SR at 72 bpm,  normal axis,  no acute ST/T changes \par \par CXR UTD 9/29/21:  NAD

## 2021-10-05 NOTE — ASSESSMENT
[Patient Optimized for Surgery] : Patient optimized for surgery [As per surgery] : as per surgery [FreeTextEntry4] : Medically optimized for proposed procedure. \par \par (F) [phone]\par (F) [phone]

## 2021-10-05 NOTE — HISTORY OF PRESENT ILLNESS
[No Pertinent Cardiac History] : no history of aortic stenosis, atrial fibrillation, coronary artery disease, recent myocardial infarction, or implantable device/pacemaker [Aortic Stenosis] : no aortic stenosis [Atrial Fibrillation] : no atrial fibrillation [Coronary Artery Disease] : no coronary artery disease [Recent Myocardial Infarction] : no recent myocardial infarction [Implantable Device/Pacemaker] : no implantable device/pacemaker [Asthma] : asthma [COPD] : COPD [Sleep Apnea] : no sleep apnea [Smoker] : smoker [Chronic Anticoagulation] : no chronic anticoagulation [Chronic Kidney Disease] : no chronic kidney disease [Diabetes] : no diabetes [(Patient denies any chest pain, claudication, dyspnea on exertion, orthopnea, palpitations or syncope)] : Patient denies any chest pain, claudication, dyspnea on exertion, orthopnea, palpitations or syncope [FreeTextEntry1] : ESWL  L sided  [FreeTextEntry2] : 10/18/21 [FreeTextEntry3] : Dr. Moreira [FreeTextEntry4] : 65 yo female presents to office for medical clearance.  Scheduled for ESWL L sided secondary to multiple L renal calculi at [facility] in [facility] Dr. Moreira on 10/18/21. \par \par Denies f/c/s no cough sense of smell/taste intact \par \par  no CP/SOB c activity no dizziness no  palpitations no N/V/D +BM qd NL no bloody/black stools \par no acute change in bowel  habits   STABLE chronic urinary frequency   Denies mekhi hematuria\par \par ***pt reports increased vomiting associated c anesthesia***\par ***+ve  allergy to shellfish/latex/iodine*** \par NO hx of Blood Transfusion \par ABO Blood Type= A positive \par NO dentures

## 2021-10-13 DIAGNOSIS — M79.661 PAIN IN RIGHT LOWER LEG: ICD-10-CM

## 2021-11-29 ENCOUNTER — APPOINTMENT (OUTPATIENT)
Dept: FAMILY MEDICINE | Facility: CLINIC | Age: 66
End: 2021-11-29
Payer: MEDICARE

## 2021-11-29 VITALS
WEIGHT: 152 LBS | HEART RATE: 73 BPM | DIASTOLIC BLOOD PRESSURE: 70 MMHG | TEMPERATURE: 98.2 F | HEIGHT: 60 IN | SYSTOLIC BLOOD PRESSURE: 108 MMHG | OXYGEN SATURATION: 98 % | BODY MASS INDEX: 29.84 KG/M2

## 2021-11-29 PROCEDURE — 99214 OFFICE O/P EST MOD 30 MIN: CPT

## 2022-03-23 ENCOUNTER — APPOINTMENT (OUTPATIENT)
Dept: FAMILY MEDICINE | Facility: CLINIC | Age: 67
End: 2022-03-23
Payer: MEDICARE

## 2022-03-23 VITALS
SYSTOLIC BLOOD PRESSURE: 116 MMHG | HEART RATE: 63 BPM | TEMPERATURE: 97.3 F | WEIGHT: 150 LBS | BODY MASS INDEX: 29.45 KG/M2 | OXYGEN SATURATION: 98 % | HEIGHT: 60 IN | DIASTOLIC BLOOD PRESSURE: 74 MMHG

## 2022-03-23 VITALS — SYSTOLIC BLOOD PRESSURE: 106 MMHG | DIASTOLIC BLOOD PRESSURE: 70 MMHG

## 2022-03-23 DIAGNOSIS — Z92.29 PERSONAL HISTORY OF OTHER DRUG THERAPY: ICD-10-CM

## 2022-03-23 PROCEDURE — 99214 OFFICE O/P EST MOD 30 MIN: CPT

## 2022-03-23 RX ORDER — BENZONATATE 200 MG/1
200 CAPSULE ORAL 3 TIMES DAILY
Qty: 30 | Refills: 1 | Status: COMPLETED | COMMUNITY
Start: 2018-06-18 | End: 2022-03-23

## 2022-03-23 NOTE — PHYSICAL EXAM
[No Acute Distress] : no acute distress [Well Nourished] : well nourished [Well Developed] : well developed [Well-Appearing] : well-appearing [PERRL] : pupils equal round and reactive to light [EOMI] : extraocular movements intact [Normal Outer Ear/Nose] : the outer ears and nose were normal in appearance [No JVD] : no jugular venous distention [No Respiratory Distress] : no respiratory distress  [No Accessory Muscle Use] : no accessory muscle use [Clear to Auscultation] : lungs were clear to auscultation bilaterally [Normal Rate] : normal rate  [Regular Rhythm] : with a regular rhythm [Normal S1, S2] : normal S1 and S2 [No Carotid Bruits] : no carotid bruits [No Edema] : there was no peripheral edema [Soft] : abdomen soft [Non Tender] : non-tender [Non-distended] : non-distended [No Masses] : no abdominal mass palpated [No HSM] : no HSM [Normal Bowel Sounds] : normal bowel sounds [No CVA Tenderness] : no CVA  tenderness [Grossly Normal Strength/Tone] : grossly normal strength/tone [No Rash] : no rash [Coordination Grossly Intact] : coordination grossly intact [No Focal Deficits] : no focal deficits [Normal Gait] : normal gait [Normal Affect] : the affect was normal [Normal Mood] : the mood was normal [Normal Insight/Judgement] : insight and judgment were intact

## 2022-03-23 NOTE — HISTORY OF PRESENT ILLNESS
[FreeTextEntry1] : Pt is here is here for anxiety, HLD and hypothyroidism.\par Pt requested blood work Rx. [de-identified] : 65 yo female for f/u on HLD/KATHERINE/Hypothyroidism \par \par as above,  no CP/SOB c activity, reports Vertigo like symptoms x 5 days, admits to poor hydration  no palpitations \par no N/V/D +BM qd NL no bloody/black stools\par no urinary complaints  \par \par Denies f/c no cough sense of smell/taste intact \par \par pt reports night sweats x nearly 2M warranting change of clothes   \par Denies associated CP and/or SOB    Denies associated palpitations no H/A

## 2022-03-23 NOTE — PLAN
[FreeTextEntry1] : see lab orders  \par \par cont current meds  \par \par increase hydration!!!!!! \par decrease caffeine intake

## 2022-05-02 ENCOUNTER — APPOINTMENT (OUTPATIENT)
Dept: FAMILY MEDICINE | Facility: CLINIC | Age: 67
End: 2022-05-02
Payer: MEDICARE

## 2022-05-02 ENCOUNTER — NON-APPOINTMENT (OUTPATIENT)
Age: 67
End: 2022-05-02

## 2022-05-02 VITALS
WEIGHT: 151 LBS | BODY MASS INDEX: 29.64 KG/M2 | TEMPERATURE: 97.1 F | DIASTOLIC BLOOD PRESSURE: 68 MMHG | HEIGHT: 60 IN | OXYGEN SATURATION: 99 % | HEART RATE: 70 BPM | SYSTOLIC BLOOD PRESSURE: 110 MMHG

## 2022-05-02 DIAGNOSIS — N20.0 CALCULUS OF KIDNEY: ICD-10-CM

## 2022-05-02 PROCEDURE — 99215 OFFICE O/P EST HI 40 MIN: CPT

## 2022-05-02 NOTE — PHYSICAL EXAM
[No Acute Distress] : no acute distress [Well Nourished] : well nourished [Well Developed] : well developed [Well-Appearing] : well-appearing [EOMI] : extraocular movements intact [Normal Outer Ear/Nose] : the outer ears and nose were normal in appearance [No JVD] : no jugular venous distention [No Respiratory Distress] : no respiratory distress  [No Accessory Muscle Use] : no accessory muscle use [Clear to Auscultation] : lungs were clear to auscultation bilaterally [Normal Rate] : normal rate  [Regular Rhythm] : with a regular rhythm [Normal S1, S2] : normal S1 and S2 [No Carotid Bruits] : no carotid bruits [No Edema] : there was no peripheral edema [Soft] : abdomen soft [Non Tender] : non-tender [Non-distended] : non-distended [Normal Bowel Sounds] : normal bowel sounds [Grossly Normal Strength/Tone] : grossly normal strength/tone [No Rash] : no rash [Coordination Grossly Intact] : coordination grossly intact [No Focal Deficits] : no focal deficits [Normal Gait] : normal gait [Normal Affect] : the affect was normal [Normal Mood] : the mood was normal [Normal Insight/Judgement] : insight and judgment were intact

## 2022-05-03 NOTE — ASSESSMENT
[As per surgery] : as per surgery [FreeTextEntry4] : Medically optimized for proposed procedure pending PST labs \par \par Addendum Note: Medically optimized for proposed procedure

## 2022-05-03 NOTE — PLAN
[FreeTextEntry1] : EKG performed: SR at 61 bpm,  normal axis, no ST/T changes \par \par await PST labs

## 2022-05-03 NOTE — HISTORY OF PRESENT ILLNESS
[No Pertinent Cardiac History] : no history of aortic stenosis, atrial fibrillation, coronary artery disease, recent myocardial infarction, or implantable device/pacemaker [Asthma] : asthma [COPD] : COPD [Smoker] : smoker [(Patient denies any chest pain, claudication, dyspnea on exertion, orthopnea, palpitations or syncope)] : Patient denies any chest pain, claudication, dyspnea on exertion, orthopnea, palpitations or syncope [Sleep Apnea] : no sleep apnea [Chronic Anticoagulation] : no chronic anticoagulation [Chronic Kidney Disease] : no chronic kidney disease [Diabetes] : no diabetes [FreeTextEntry1] : Lithotripsy [FreeTextEntry2] : 05/04/22 [FreeTextEntry3] : Dr. Malachi Moreira ([place])  [FreeTextEntry4] : 67 yo female for medical clearance.   Pt scheduled for ESWL on 5/4/22 c Dr. Moreira at [facility] in [place].\par (F) [phone]\par \par no CP/SOB c activity no change in chronic STABLE intermittent no palpitations \par +ve Nausea secondary to Renal Colic   (Hx of Renal Calculi B/L (3 stones on L and 4 stones on R) no diarrhea no bloody/black stools \par +ve pressure/burning on urination\par \par no fever +ve chills secondary to  related pain \par \par *** pt reports increased vomiting  associated c anesthesia*** \par +ve allergy to shellfish/latex/iodine \par NO hx of Blood Transfusion \par ABO Blood Type= A positive \par NO dentures

## 2022-05-13 ENCOUNTER — INPATIENT (INPATIENT)
Facility: HOSPITAL | Age: 67
LOS: 4 days | Discharge: HOME | DRG: 392 | End: 2022-05-18
Attending: HOSPITALIST | Admitting: FAMILY MEDICINE
Payer: MEDICARE

## 2022-05-13 VITALS
SYSTOLIC BLOOD PRESSURE: 122 MMHG | HEIGHT: 60 IN | OXYGEN SATURATION: 98 % | HEART RATE: 83 BPM | RESPIRATION RATE: 18 BRPM | WEIGHT: 149.91 LBS | TEMPERATURE: 97 F | DIASTOLIC BLOOD PRESSURE: 71 MMHG

## 2022-05-13 DIAGNOSIS — K57.92 DIVERTICULITIS OF INTESTINE, PART UNSPECIFIED, WITHOUT PERFORATION OR ABSCESS WITHOUT BLEEDING: ICD-10-CM

## 2022-05-13 DIAGNOSIS — F41.9 ANXIETY DISORDER, UNSPECIFIED: ICD-10-CM

## 2022-05-13 DIAGNOSIS — R73.9 HYPERGLYCEMIA, UNSPECIFIED: ICD-10-CM

## 2022-05-13 DIAGNOSIS — Z29.9 ENCOUNTER FOR PROPHYLACTIC MEASURES, UNSPECIFIED: ICD-10-CM

## 2022-05-13 DIAGNOSIS — R93.89 ABNORMAL FINDINGS ON DIAGNOSTIC IMAGING OF OTHER SPECIFIED BODY STRUCTURES: ICD-10-CM

## 2022-05-13 DIAGNOSIS — E03.9 HYPOTHYROIDISM, UNSPECIFIED: ICD-10-CM

## 2022-05-13 DIAGNOSIS — N20.0 CALCULUS OF KIDNEY: ICD-10-CM

## 2022-05-13 DIAGNOSIS — J45.909 UNSPECIFIED ASTHMA, UNCOMPLICATED: ICD-10-CM

## 2022-05-13 DIAGNOSIS — Z98.1 ARTHRODESIS STATUS: Chronic | ICD-10-CM

## 2022-05-13 LAB
ALBUMIN SERPL ELPH-MCNC: 3.5 G/DL — SIGNIFICANT CHANGE UP (ref 3.3–5)
ALP SERPL-CCNC: 74 U/L — SIGNIFICANT CHANGE UP (ref 40–120)
ALT FLD-CCNC: 28 U/L — SIGNIFICANT CHANGE UP (ref 12–78)
ANION GAP SERPL CALC-SCNC: 6 MMOL/L — SIGNIFICANT CHANGE UP (ref 5–17)
APPEARANCE UR: CLEAR — SIGNIFICANT CHANGE UP
APTT BLD: 31.5 SEC — SIGNIFICANT CHANGE UP (ref 27.5–35.5)
AST SERPL-CCNC: 14 U/L — LOW (ref 15–37)
BACTERIA # UR AUTO: NEGATIVE — SIGNIFICANT CHANGE UP
BASOPHILS # BLD AUTO: 0.05 K/UL — SIGNIFICANT CHANGE UP (ref 0–0.2)
BASOPHILS NFR BLD AUTO: 0.4 % — SIGNIFICANT CHANGE UP (ref 0–2)
BILIRUB SERPL-MCNC: 0.2 MG/DL — SIGNIFICANT CHANGE UP (ref 0.2–1.2)
BILIRUB UR-MCNC: NEGATIVE — SIGNIFICANT CHANGE UP
BUN SERPL-MCNC: 21 MG/DL — SIGNIFICANT CHANGE UP (ref 7–23)
CALCIUM SERPL-MCNC: 9.2 MG/DL — SIGNIFICANT CHANGE UP (ref 8.5–10.1)
CHLORIDE SERPL-SCNC: 112 MMOL/L — HIGH (ref 96–108)
CO2 SERPL-SCNC: 24 MMOL/L — SIGNIFICANT CHANGE UP (ref 22–31)
COLOR SPEC: YELLOW — SIGNIFICANT CHANGE UP
CREAT SERPL-MCNC: 0.67 MG/DL — SIGNIFICANT CHANGE UP (ref 0.5–1.3)
DIFF PNL FLD: ABNORMAL
EGFR: 97 ML/MIN/1.73M2 — SIGNIFICANT CHANGE UP
EOSINOPHIL # BLD AUTO: 0.08 K/UL — SIGNIFICANT CHANGE UP (ref 0–0.5)
EOSINOPHIL NFR BLD AUTO: 0.6 % — SIGNIFICANT CHANGE UP (ref 0–6)
EPI CELLS # UR: SIGNIFICANT CHANGE UP
GLUCOSE SERPL-MCNC: 104 MG/DL — HIGH (ref 70–99)
GLUCOSE UR QL: NEGATIVE — SIGNIFICANT CHANGE UP
HCT VFR BLD CALC: 40.5 % — SIGNIFICANT CHANGE UP (ref 34.5–45)
HGB BLD-MCNC: 12.9 G/DL — SIGNIFICANT CHANGE UP (ref 11.5–15.5)
HYALINE CASTS # UR AUTO: NEGATIVE — SIGNIFICANT CHANGE UP
IMM GRANULOCYTES NFR BLD AUTO: 0.6 % — SIGNIFICANT CHANGE UP (ref 0–1.5)
INR BLD: 0.94 RATIO — SIGNIFICANT CHANGE UP (ref 0.88–1.16)
KETONES UR-MCNC: NEGATIVE — SIGNIFICANT CHANGE UP
LEUKOCYTE ESTERASE UR-ACNC: NEGATIVE — SIGNIFICANT CHANGE UP
LYMPHOCYTES # BLD AUTO: 1.82 K/UL — SIGNIFICANT CHANGE UP (ref 1–3.3)
LYMPHOCYTES # BLD AUTO: 13.6 % — SIGNIFICANT CHANGE UP (ref 13–44)
MCHC RBC-ENTMCNC: 29.4 PG — SIGNIFICANT CHANGE UP (ref 27–34)
MCHC RBC-ENTMCNC: 31.9 GM/DL — LOW (ref 32–36)
MCV RBC AUTO: 92.3 FL — SIGNIFICANT CHANGE UP (ref 80–100)
MONOCYTES # BLD AUTO: 0.54 K/UL — SIGNIFICANT CHANGE UP (ref 0–0.9)
MONOCYTES NFR BLD AUTO: 4 % — SIGNIFICANT CHANGE UP (ref 2–14)
NEUTROPHILS # BLD AUTO: 10.82 K/UL — HIGH (ref 1.8–7.4)
NEUTROPHILS NFR BLD AUTO: 80.8 % — HIGH (ref 43–77)
NITRITE UR-MCNC: NEGATIVE — SIGNIFICANT CHANGE UP
NRBC # BLD: 0 /100 WBCS — SIGNIFICANT CHANGE UP (ref 0–0)
PH UR: 6 — SIGNIFICANT CHANGE UP (ref 5–8)
PLATELET # BLD AUTO: 280 K/UL — SIGNIFICANT CHANGE UP (ref 150–400)
POTASSIUM SERPL-MCNC: 3.8 MMOL/L — SIGNIFICANT CHANGE UP (ref 3.5–5.3)
POTASSIUM SERPL-SCNC: 3.8 MMOL/L — SIGNIFICANT CHANGE UP (ref 3.5–5.3)
PROT SERPL-MCNC: 7.9 G/DL — SIGNIFICANT CHANGE UP (ref 6–8.3)
PROT UR-MCNC: 15
PROTHROM AB SERPL-ACNC: 11 SEC — SIGNIFICANT CHANGE UP (ref 10.5–13.4)
RBC # BLD: 4.39 M/UL — SIGNIFICANT CHANGE UP (ref 3.8–5.2)
RBC # FLD: 13.6 % — SIGNIFICANT CHANGE UP (ref 10.3–14.5)
RBC CASTS # UR COMP ASSIST: ABNORMAL /HPF (ref 0–4)
SARS-COV-2 RNA SPEC QL NAA+PROBE: SIGNIFICANT CHANGE UP
SODIUM SERPL-SCNC: 142 MMOL/L — SIGNIFICANT CHANGE UP (ref 135–145)
SP GR SPEC: 1.02 — SIGNIFICANT CHANGE UP (ref 1.01–1.02)
UROBILINOGEN FLD QL: NEGATIVE — SIGNIFICANT CHANGE UP
WBC # BLD: 13.39 K/UL — HIGH (ref 3.8–10.5)
WBC # FLD AUTO: 13.39 K/UL — HIGH (ref 3.8–10.5)
WBC UR QL: SIGNIFICANT CHANGE UP

## 2022-05-13 PROCEDURE — 99285 EMERGENCY DEPT VISIT HI MDM: CPT

## 2022-05-13 PROCEDURE — 74176 CT ABD & PELVIS W/O CONTRAST: CPT | Mod: 26,MA

## 2022-05-13 PROCEDURE — 99223 1ST HOSP IP/OBS HIGH 75: CPT | Mod: GC

## 2022-05-13 RX ORDER — HYDROMORPHONE HYDROCHLORIDE 2 MG/ML
0.5 INJECTION INTRAMUSCULAR; INTRAVENOUS; SUBCUTANEOUS EVERY 4 HOURS
Refills: 0 | Status: DISCONTINUED | OUTPATIENT
Start: 2022-05-13 | End: 2022-05-18

## 2022-05-13 RX ORDER — ENOXAPARIN SODIUM 100 MG/ML
40 INJECTION SUBCUTANEOUS EVERY 24 HOURS
Refills: 0 | Status: DISCONTINUED | OUTPATIENT
Start: 2022-05-13 | End: 2022-05-18

## 2022-05-13 RX ORDER — HYDROMORPHONE HYDROCHLORIDE 2 MG/ML
0.5 INJECTION INTRAMUSCULAR; INTRAVENOUS; SUBCUTANEOUS ONCE
Refills: 0 | Status: DISCONTINUED | OUTPATIENT
Start: 2022-05-13 | End: 2022-05-13

## 2022-05-13 RX ORDER — SODIUM CHLORIDE 9 MG/ML
1000 INJECTION INTRAMUSCULAR; INTRAVENOUS; SUBCUTANEOUS
Refills: 0 | Status: DISCONTINUED | OUTPATIENT
Start: 2022-05-13 | End: 2022-05-13

## 2022-05-13 RX ORDER — METRONIDAZOLE 500 MG
500 TABLET ORAL ONCE
Refills: 0 | Status: COMPLETED | OUTPATIENT
Start: 2022-05-13 | End: 2022-05-13

## 2022-05-13 RX ORDER — SODIUM CHLORIDE 9 MG/ML
1000 INJECTION INTRAMUSCULAR; INTRAVENOUS; SUBCUTANEOUS
Refills: 0 | Status: DISCONTINUED | OUTPATIENT
Start: 2022-05-13 | End: 2022-05-14

## 2022-05-13 RX ORDER — MONTELUKAST 4 MG/1
0 TABLET, CHEWABLE ORAL
Qty: 0 | Refills: 0 | DISCHARGE

## 2022-05-13 RX ORDER — CIPROFLOXACIN LACTATE 400MG/40ML
400 VIAL (ML) INTRAVENOUS EVERY 12 HOURS
Refills: 0 | Status: DISCONTINUED | OUTPATIENT
Start: 2022-05-13 | End: 2022-05-14

## 2022-05-13 RX ORDER — LANOLIN ALCOHOL/MO/W.PET/CERES
3 CREAM (GRAM) TOPICAL AT BEDTIME
Refills: 0 | Status: DISCONTINUED | OUTPATIENT
Start: 2022-05-13 | End: 2022-05-18

## 2022-05-13 RX ORDER — ALBUTEROL 90 UG/1
0 AEROSOL, METERED ORAL
Qty: 0 | Refills: 0 | DISCHARGE

## 2022-05-13 RX ORDER — LEVOTHYROXINE SODIUM 125 MCG
137 TABLET ORAL DAILY
Refills: 0 | Status: DISCONTINUED | OUTPATIENT
Start: 2022-05-13 | End: 2022-05-18

## 2022-05-13 RX ORDER — TAMSULOSIN HYDROCHLORIDE 0.4 MG/1
0.4 CAPSULE ORAL AT BEDTIME
Refills: 0 | Status: DISCONTINUED | OUTPATIENT
Start: 2022-05-13 | End: 2022-05-13

## 2022-05-13 RX ORDER — BUDESONIDE AND FORMOTEROL FUMARATE DIHYDRATE 160; 4.5 UG/1; UG/1
2 AEROSOL RESPIRATORY (INHALATION)
Refills: 0 | Status: DISCONTINUED | OUTPATIENT
Start: 2022-05-13 | End: 2022-05-18

## 2022-05-13 RX ORDER — ALBUTEROL 90 UG/1
1 AEROSOL, METERED ORAL EVERY 6 HOURS
Refills: 0 | Status: DISCONTINUED | OUTPATIENT
Start: 2022-05-13 | End: 2022-05-18

## 2022-05-13 RX ORDER — TAMSULOSIN HYDROCHLORIDE 0.4 MG/1
0.4 CAPSULE ORAL AT BEDTIME
Refills: 0 | Status: DISCONTINUED | OUTPATIENT
Start: 2022-05-13 | End: 2022-05-18

## 2022-05-13 RX ORDER — METRONIDAZOLE 500 MG
500 TABLET ORAL EVERY 8 HOURS
Refills: 0 | Status: DISCONTINUED | OUTPATIENT
Start: 2022-05-13 | End: 2022-05-17

## 2022-05-13 RX ORDER — METOCLOPRAMIDE HCL 10 MG
10 TABLET ORAL ONCE
Refills: 0 | Status: COMPLETED | OUTPATIENT
Start: 2022-05-13 | End: 2022-05-13

## 2022-05-13 RX ORDER — ACETAMINOPHEN 500 MG
650 TABLET ORAL EVERY 6 HOURS
Refills: 0 | Status: DISCONTINUED | OUTPATIENT
Start: 2022-05-13 | End: 2022-05-18

## 2022-05-13 RX ORDER — MONTELUKAST 4 MG/1
10 TABLET, CHEWABLE ORAL DAILY
Refills: 0 | Status: DISCONTINUED | OUTPATIENT
Start: 2022-05-13 | End: 2022-05-18

## 2022-05-13 RX ORDER — CLONAZEPAM 1 MG
1 TABLET ORAL AT BEDTIME
Refills: 0 | Status: DISCONTINUED | OUTPATIENT
Start: 2022-05-13 | End: 2022-05-15

## 2022-05-13 RX ORDER — LEVOTHYROXINE SODIUM 125 MCG
0 TABLET ORAL
Qty: 0 | Refills: 0 | DISCHARGE

## 2022-05-13 RX ORDER — ACETAMINOPHEN 500 MG
1000 TABLET ORAL ONCE
Refills: 0 | Status: COMPLETED | OUTPATIENT
Start: 2022-05-13 | End: 2022-05-13

## 2022-05-13 RX ORDER — ONDANSETRON 8 MG/1
4 TABLET, FILM COATED ORAL ONCE
Refills: 0 | Status: COMPLETED | OUTPATIENT
Start: 2022-05-13 | End: 2022-05-13

## 2022-05-13 RX ORDER — CETIRIZINE HYDROCHLORIDE 10 MG/1
0 TABLET ORAL
Qty: 0 | Refills: 0 | DISCHARGE

## 2022-05-13 RX ORDER — SODIUM CHLORIDE 9 MG/ML
1000 INJECTION INTRAMUSCULAR; INTRAVENOUS; SUBCUTANEOUS ONCE
Refills: 0 | Status: COMPLETED | OUTPATIENT
Start: 2022-05-13 | End: 2022-05-13

## 2022-05-13 RX ORDER — SIMETHICONE 80 MG/1
80 TABLET, CHEWABLE ORAL ONCE
Refills: 0 | Status: COMPLETED | OUTPATIENT
Start: 2022-05-13 | End: 2022-05-13

## 2022-05-13 RX ORDER — METRONIDAZOLE 500 MG
500 TABLET ORAL EVERY 6 HOURS
Refills: 0 | Status: DISCONTINUED | OUTPATIENT
Start: 2022-05-13 | End: 2022-05-13

## 2022-05-13 RX ORDER — HYDROMORPHONE HYDROCHLORIDE 2 MG/ML
0.25 INJECTION INTRAMUSCULAR; INTRAVENOUS; SUBCUTANEOUS EVERY 4 HOURS
Refills: 0 | Status: DISCONTINUED | OUTPATIENT
Start: 2022-05-13 | End: 2022-05-18

## 2022-05-13 RX ORDER — ONDANSETRON 8 MG/1
4 TABLET, FILM COATED ORAL EVERY 4 HOURS
Refills: 0 | Status: DISCONTINUED | OUTPATIENT
Start: 2022-05-13 | End: 2022-05-18

## 2022-05-13 RX ORDER — CIPROFLOXACIN LACTATE 400MG/40ML
400 VIAL (ML) INTRAVENOUS ONCE
Refills: 0 | Status: COMPLETED | OUTPATIENT
Start: 2022-05-13 | End: 2022-05-13

## 2022-05-13 RX ORDER — BUDESONIDE AND FORMOTEROL FUMARATE DIHYDRATE 160; 4.5 UG/1; UG/1
0 AEROSOL RESPIRATORY (INHALATION)
Qty: 0 | Refills: 0 | DISCHARGE

## 2022-05-13 RX ADMIN — Medication 10 MILLIGRAM(S): at 14:15

## 2022-05-13 RX ADMIN — HYDROMORPHONE HYDROCHLORIDE 0.5 MILLIGRAM(S): 2 INJECTION INTRAMUSCULAR; INTRAVENOUS; SUBCUTANEOUS at 19:45

## 2022-05-13 RX ADMIN — HYDROMORPHONE HYDROCHLORIDE 0.5 MILLIGRAM(S): 2 INJECTION INTRAMUSCULAR; INTRAVENOUS; SUBCUTANEOUS at 11:56

## 2022-05-13 RX ADMIN — SODIUM CHLORIDE 1000 MILLILITER(S): 9 INJECTION INTRAMUSCULAR; INTRAVENOUS; SUBCUTANEOUS at 09:25

## 2022-05-13 RX ADMIN — Medication 100 MILLIGRAM(S): at 11:00

## 2022-05-13 RX ADMIN — ENOXAPARIN SODIUM 40 MILLIGRAM(S): 100 INJECTION SUBCUTANEOUS at 16:42

## 2022-05-13 RX ADMIN — HYDROMORPHONE HYDROCHLORIDE 0.25 MILLIGRAM(S): 2 INJECTION INTRAMUSCULAR; INTRAVENOUS; SUBCUTANEOUS at 00:17

## 2022-05-13 RX ADMIN — SODIUM CHLORIDE 100 MILLILITER(S): 9 INJECTION INTRAMUSCULAR; INTRAVENOUS; SUBCUTANEOUS at 16:48

## 2022-05-13 RX ADMIN — Medication 1000 MILLIGRAM(S): at 14:05

## 2022-05-13 RX ADMIN — HYDROMORPHONE HYDROCHLORIDE 0.5 MILLIGRAM(S): 2 INJECTION INTRAMUSCULAR; INTRAVENOUS; SUBCUTANEOUS at 08:40

## 2022-05-13 RX ADMIN — Medication 200 MILLIGRAM(S): at 12:00

## 2022-05-13 RX ADMIN — SODIUM CHLORIDE 1000 MILLILITER(S): 9 INJECTION INTRAMUSCULAR; INTRAVENOUS; SUBCUTANEOUS at 08:25

## 2022-05-13 RX ADMIN — Medication 400 MILLIGRAM(S): at 13:48

## 2022-05-13 RX ADMIN — Medication 400 MILLIGRAM(S): at 13:00

## 2022-05-13 RX ADMIN — BUDESONIDE AND FORMOTEROL FUMARATE DIHYDRATE 2 PUFF(S): 160; 4.5 AEROSOL RESPIRATORY (INHALATION) at 19:14

## 2022-05-13 RX ADMIN — HYDROMORPHONE HYDROCHLORIDE 0.5 MILLIGRAM(S): 2 INJECTION INTRAMUSCULAR; INTRAVENOUS; SUBCUTANEOUS at 11:00

## 2022-05-13 RX ADMIN — Medication 1000 MILLIGRAM(S): at 16:39

## 2022-05-13 RX ADMIN — SODIUM CHLORIDE 100 MILLILITER(S): 9 INJECTION INTRAMUSCULAR; INTRAVENOUS; SUBCUTANEOUS at 19:47

## 2022-05-13 RX ADMIN — Medication 500 MILLIGRAM(S): at 12:00

## 2022-05-13 RX ADMIN — SIMETHICONE 80 MILLIGRAM(S): 80 TABLET, CHEWABLE ORAL at 21:30

## 2022-05-13 RX ADMIN — HYDROMORPHONE HYDROCHLORIDE 0.5 MILLIGRAM(S): 2 INJECTION INTRAMUSCULAR; INTRAVENOUS; SUBCUTANEOUS at 11:03

## 2022-05-13 RX ADMIN — ONDANSETRON 4 MILLIGRAM(S): 8 TABLET, FILM COATED ORAL at 08:30

## 2022-05-13 RX ADMIN — ONDANSETRON 4 MILLIGRAM(S): 8 TABLET, FILM COATED ORAL at 19:51

## 2022-05-13 NOTE — H&P ADULT - PROBLEM SELECTOR PROBLEM 1
"  MOVEMENT DISORDERS CLINIC NEW CONSULT NOTE    PCP/Referring Provider: Aaareferral Self  No address on file  Date of Service: 12/17/2020    Chief Complaint: Parkinson's     HPI: Diego Oliver is a R HANDED 69 y.o. male with a medical issues significant for history of hepatitis C, cardiomyopathy, paroxysmal SVT s/p ICD and pacemaker, chronic diastolic heart failure, HLD, mitral valve disorder, VIKAS, hypothyroidism, and HTN who presents for parkinson's disease. Accompanied by his wife Glory today. Previously followed by Dr. Huerta at [facility]. Diagnosed with PD middle of may 2020. First noticed tremors about 10 years ago, both hands. Started as postural, action and rest. Started gradually. Also noticed a head tremor about 10 years. Started with heart issues 2009, was put on amiodarone at that time, caused thyroid issues. No longer on amiodarone. Very seldom alcohol intake but when he does, he does notice that his tremor improves.   Originally treated with klonopin 0.5 mg BID. A couple of months later they put him on primidone. (this was started about 10 years ago) Originally put on 100 mg BID, then bradley wanted to discontinue due to thinking that his tremor was for PDism. [place] like this helped his tremor for a while. Tremor started getting really bad in May and saw Bradley for the first time. Started to wean down primidone, and started azilect. Did not feel like it helped, got worse with it. Stopped azilect. Increased klonopin to 1 mg BID, has not really helped tremor. Started ropinirole which he did not feel like helped his tremor. On "even" days he was on 6 mg of requip QD and on "odd" days he was on 4 mg of requip QD, and then was told to stop cold turkey. Stopped it 1st of December.   Had Raven scan in 8/2020. In may noticed that it was worse resting, feels like he tremors all the time. Balance issues also started in several years ago. Fell off of a ladder last year. No falls since then. No shuffling. Feels slower " when walking. Feels like he wobbles sometimes. Decreased L arm swing. Doing some furniture surfing. Sleeping a lot during the day. Has trouble sleeping at night. Up every 2 hours to urinate. Has some muscle pulling in his neck for about 3-4 years. Some tension headaches. Tenderness in the left side of his head, causes blurry vision, and pain in jaw in the last few days. Jaw quivering/mouth tremor.   Feels stiff. Decreased right arm swing, broke his right collar bone a couple of years ago. Takes 25 mg of benadryl every morning for allergies. Has done this for several years.     Retired NICU nurse.     Medication history:  - currently on klonopin 1 mg BID   - no longer on primidone or requip or azilect.     Neuroleptic exposure:  - none     Family History: brother with head tremor after head trauma otherwise no family history of tremor or parkinson's     Past medical, family, and social history reviewed as documented in chart with pertinent positive medical, family, and social history detailed in HPI.      PD Review of Symptoms:  Anosmia: lost many years ago   Dysarthria/Hypophonia: soft spoken   Dysphagia/Sialorrhea: some trouble swallowing with left jaw pain, some drooling but not consistently  Depression: yes, some apathy   Cognitive slowing: issues with comprehension, and some short term memory issues   Hallucinations: had hallucinations with the ropinirole, prior to that none.  None since he stopped it.   Impulsivity: very antoine, gets aggravated very easily. Wife says he goes from 0 to 100. New for him. Has been getting progressive worse since cardiac issues began.   Obsessions/Compulsions: more shopping, impulsivity with shopping also. Has had OCD for many years. Buying a lot of subscriptions to things and doesn't remember doing it.   Urinary changes: frequency, no incontinence   Constipation: yes, chronic. On miralax   Orthostasis: yes, before he started the ropinirole and still happening now that is off of it.    Erectile Dysfunction: none   Falls: none  Freezing: none  Micrographia: yes  Sleep issues:  -DEEPA: none  -RBD: none     Review of Systems:   Review of Systems   Constitutional: Negative for chills, fever and malaise/fatigue.   HENT: Negative for hearing loss.    Eyes: Negative for blurred vision and double vision.   Respiratory: Negative for cough, shortness of breath and stridor.    Cardiovascular: Negative for chest pain and leg swelling.   Gastrointestinal: Negative for constipation, diarrhea and nausea.   Genitourinary: Negative for frequency and urgency.   Musculoskeletal: Negative for falls.   Skin: Negative for itching and rash.   Neurological: Positive for tremors. Negative for dizziness, loss of consciousness and weakness.   Psychiatric/Behavioral: Negative for hallucinations and memory loss.         Current Medications:  Outpatient Encounter Medications as of 12/17/2020   Medication Sig Dispense Refill    atorvastatin (LIPITOR) 20 MG tablet Take 20 mg by mouth once daily.      clonazePAM (KLONOPIN) 1 MG tablet Take 1 mg by mouth 2 (two) times a day.      diphenhydrAMINE (SOMINEX) 25 mg tablet Take 25 mg by mouth nightly as needed for Insomnia.      escitalopram oxalate (LEXAPRO) 10 MG tablet Take 10 mg by mouth once daily.      furosemide (LASIX) 20 MG tablet Take 20 mg by mouth once daily.      levothyroxine (LEVOXYL) 100 MCG tablet Take 100 mcg by mouth once daily.      melatonin 10 mg Tab Take 10 mg by mouth as needed.      metoprolol succinate (TOPROL-XL) 100 MG 24 hr tablet Take 100 mg by mouth once daily.      XARELTO 20 mg Tab Take 20 mg by mouth once daily.      zolpidem (AMBIEN) 10 mg Tab Take 5 mg by mouth nightly as needed.      carbidopa-levodopa  mg (SINEMET)  mg per tablet Take 1 tablet by mouth 3 (three) times daily. 90 tablet 11    [START ON 12/25/2020] clonazePAM (KLONOPIN) 1 MG tablet Take 1 tablet (1 mg total) by mouth every evening. 30 tablet 1     No  Acute diverticulitis facility-administered encounter medications on file as of 12/17/2020.        Past Medical History:  Patient Active Problem List   Diagnosis    Counseling regarding goals of care    Tremor    Blurry vision, bilateral    Numbness and tingling of both feet    Cardiomyopathy, unspecified    Chronic diastolic heart failure    VIKAS (generalized anxiety disorder)    Hypothyroidism    Mixed hyperlipidemia    Neck pain    Paroxysmal atrial fibrillation    Paroxysmal SVT (supraventricular tachycardia)    Presence of automatic (implantable) cardiac defibrillator    Tension type headache       Past Surgical History:  No past surgical history on file.    Current Living Situation: at home with wife in [place].     Social:  Social History     Socioeconomic History    Marital status:      Spouse name: Not on file    Number of children: Not on file    Years of education: Not on file    Highest education level: Not on file   Occupational History    Not on file   Social Needs    Financial resource strain: Not on file    Food insecurity     Worry: Not on file     Inability: Not on file    Transportation needs     Medical: Not on file     Non-medical: Not on file   Tobacco Use    Smoking status: Never Smoker    Smokeless tobacco: Never Used   Substance and Sexual Activity    Alcohol use: Never     Frequency: Never    Drug use: Not on file    Sexual activity: Not on file   Lifestyle    Physical activity     Days per week: Not on file     Minutes per session: Not on file    Stress: Not on file   Relationships    Social connections     Talks on phone: Not on file     Gets together: Not on file     Attends [employer] service: Not on file     Active member of club or organization: Not on file     Attends meetings of clubs or organizations: Not on file     Relationship status: Not on file   Other Topics Concern    Not on file   Social History Narrative    Not on file       Family History:  No family  "history on file.    PHYSICAL:  /65   Pulse 77   Ht 6' 1" (1.854 m)   Wt 93.4 kg (206 lb)   BMI 27.18 kg/m²     General Medical Examination:  General: Good hygiene, appropriate appearance.  HEENT: Normocephalic, atraumatic.   Neck: Supple.   Chest: Unlabored breathing.   CV: Symmetric pulses.   Ext: No clubbing, cyanosis, or edema.     Mental Status:  Mood/Affect: Appropriate/congruent, mild hypomimia  Level of consciousness: Awake, alert.  Orientation: Oriented to person, place, time and situation.  Language: No Dysarthria, very mild hypophonia     Cranial nerves:  I: Not tested  II: PERRL, VFF to counting  III, IV, VI: EOMI with conjugate gaze and no nystagmus on end gaze  V: Facial sensation intact and symmetric over the bilateral V1-V3  VII: Facial muscle activation intact and symmetric over the bilateral upper and lower face  VIII: Hearing intact in the b/l ears and symmetrical to finger rub  IX, X, XII: TUP midline - no atrophy or fasiculations  X: SCMs and shoulder shrug full strength b/l and symmetric    Motor:   -UE: 5/5 deltoids; 5/5 biceps, triceps; 5/5 wrist flexors, extensors; 5/5 interosseous; 5/5   -LEs: 4/5 hip flexion, extension; 4/5 knee flexion, extension; 4/5 ankle flexion, extension      DTRs:  ? Biceps Triceps Brachioradialis Knee   Left 2+ 2+ 2+ 2+, spreads   Right 2+ 2+ 2+ 2+, spreads   -Plantar reflex: negative   -Alvarez's reflex: negative    ? Finger taps Finger flicks EVERETT Heel taps   Left  +  +  +  +   Right  +  +  +  +     Neck tone:  nl  ? Arm Leg   Left  nl  nl   Right  nl  nl     Sensation:   -Light touch: Intact and symmetric in the bilateral upper and lower extremities.  -Vibration: Intact and symmetric in the bilateral upper extremities. Decreased vibratory sense to the knees in bilateral lower extremities.       Coordination:   -Finger to nose: nl      Gait:  -Arises from chair without use of hands.  -Moderate anterior stoop  -Casual gait is: wide based  -Stride " length: normal  -Arm Swing: decreased R arm sing   -Turnin-2 step turn       Tremor Exam   Arms extended Arms in wing position, fingers almost touching Re-emergent Arms extended wrists extended Intention Resting Kinetic   Left ?+ ?+ ? ? ? ?+, intermittent, 5-6 hz frequency  ?   Right ?+ ?+ ? ? ? ?+, intermittent, 5-6 hz frequency  ?   Head tremor: NEG  Leg tremor: NEG     Archimedes Spirals   Left ?   Right ?           Laboratory Data:  none    Imaging:  None     Assessment/Plan:   Problem List Items Addressed This Visit        Neuro    Tremor - Primary    Overview     Postural tremor x 10+ years, EtOH responsive   Now with resting tremor, decreased R arm swing and bradykinesia  No rigidity          Current Assessment & Plan     Currently on clonazepam 1 mg BID, rec'd only taking at night time due to excessive day time sleepiness. Counseled patient on possibility of discontinuing in the future unless it is masking RBD. Counseled on not abruptly stopping this medication, it HAS to be tapered down.   Failed ropinirole in the past, caused hallucinations and impulse control disorder --- further supports this being PDism  ET conversion to PDism? Resting tremor was higher frequency than what would be expected in PDism but he was anxious during our exam, could have played a part in the tremor as well.   Trial of carbidopa/levodopa   Will plan to review Raven scan  Follow up in one month            Relevant Medications    carbidopa-levodopa  mg (SINEMET)  mg per tablet    clonazePAM (KLONOPIN) 1 MG tablet (Start on 2020)       Ophtho    Blurry vision, bilateral    Current Assessment & Plan     Worsening bilateral blurry vision  Ref to ophto  Continue to monitor          Relevant Orders    Ambulatory consult to Ophthalmology       Other    Counseling regarding goals of care    Numbness and tingling of both feet    Current Assessment & Plan     Peripheral neuropathy labs in the future   Discussed  possibility of gabapentin for tremor and neuropathy  Continue to monitor          Relevant Orders    CBC Auto Differential    Folate    Methylmalonic Acid, Serum    Comprehensive Metabolic Panel    RPR    Sedimentation rate    TSH    Vitamin B1    Vitamin B12              Follow-up: in 1 month       Collaborating Physician, Dr. Hodges, was available to staff today's encounter. Any change to plan along with cosign to appear in the EMR.       I spent 60 minutes with the patient; more than 50% was in counseling about   - results of data, diagnosis, and recommendations stated above  - the prognosis of parkinson's  - etiologies of tremor   - risks and benefits of medications and their side effects   - importance of diet and exercise         Rhianna Ruiz PA-C   [facility]  Department of Neurology  Movement Disorders

## 2022-05-13 NOTE — ED PROVIDER NOTE - CARE PLAN
Principal Discharge DX:	Diverticulitis  Secondary Diagnosis:	Kidney stone  Secondary Diagnosis:	Intractable abdominal pain  Secondary Diagnosis:	Intractable nausea and vomiting   1

## 2022-05-13 NOTE — ED PROVIDER NOTE - NSICDXPASTSURGICALHX_GEN_ALL_CORE_FT
PAST SURGICAL HISTORY:  Asthma      Delivery     History of Appendectomy     Kidney Calculus- Lithotripsy x 3     S/P Hysterectomy     S/P Laparoscopic Cholecystectomy     S/P Total Thyroidectomy

## 2022-05-13 NOTE — ED PROVIDER NOTE - NSICDXPASTMEDICALHX_GEN_ALL_CORE_FT
PAST MEDICAL HISTORY:  Anxiety     Fibromyalgia     Hypothyroidism     Kidney Calculi     Migraines     Obesity

## 2022-05-13 NOTE — ED PROVIDER NOTE - NS ED ROS FT
Constitutional: + chills.  Neurological: No reported acute headache.  Eyes: No reported new vision changes.   Ears, Nose, Mouth, Throat: No reported acute sore throat.  Cardiovascular: No reported current chest pain.  Respiratory: No reported new shortness of breath.  Gastrointestinal: + nausea, abdominal pain, vomiting.  Genitourinary: + dysuria.  Musculoskeletal: No reported acute extremity pain.  Integumentary (skin and/or breast): No reported new rash.

## 2022-05-13 NOTE — H&P ADULT - NSHPSOCIALHISTORY_GEN_ALL_CORE
Lives in home with , able to ambulate without assistive device, but occasionally requires assistance with ADLs/IADLs from  due to chronic back pain  Denies alcohol or recreational drug use, smokes 5 cigarettes daily for past 15 years  Vaccinated for COVID x 3 ([employer]) Lives in home with , able to ambulate without assistive device, but occasionally requires assistance with ADLs/IADLs from  due to chronic back pain  Denies alcohol or recreational drug use, smokes 5 cigarettes daily for past 15 years  Vaccinated for COVID x 3 ([employer])    No family hx of sudden cardiac death

## 2022-05-13 NOTE — H&P ADULT - PROBLEM SELECTOR PLAN 6
DVT ppx: Lovenox 40 mg daily Pt with "Left hepatic lobe cystic lesion measures 1.9 cm, previously 1.2 cm on 11/13/2019" and "1 cm nodular density in the left upper quadrant adjacent to the stomach and left adrenal gland, tubular in orientation on sagittal images, favored to represent a focal splenic artery aneurysm, stable in size since 2019  - Pt notified of imaging findings  - Will likely need f/u MRI outpatient on a non-emergent basis for further characterization Pt with "Left hepatic lobe cystic lesion measures 1.9 cm, previously 1.2 cm on 11/13/2019" and "1 cm nodular density in the left upper quadrant adjacent to the stomach and left adrenal gland, tubular in orientation on sagittal images, favored to represent a focal splenic artery aneurysm, stable in size since 2019  - Pt notified of imaging findings  - Patient informed she will need MRI outpatient on a non-emergent basis for further characterization Pt with "Left hepatic lobe cystic lesion measures 1.9 cm, previously 1.2 cm on 11/13/2019" and "1 cm nodular density in the left upper quadrant adjacent to the stomach and left adrenal gland, tubular in orientation on sagittal images, favored to represent a focal splenic artery aneurysm, stable in size since 2019  - Pt notified of imaging findings  - Patient informed she will need MRI outpatient on a non-emergent basis for further characterization  -Subcentimeter dense focus of the left kidney on image 26-27 series 2 could reflect area of mineralization   or hemorrhagic/proteinaceous cyst. Follow up with Dr. Moreira

## 2022-05-13 NOTE — ED ADULT NURSE NOTE - OBJECTIVE STATEMENT
Received the patient in the Er. Patient is alert and oriented. Skin warm and dry. C/O Left flank pain. H/O Lithotripsy done last week.

## 2022-05-13 NOTE — ED PROVIDER NOTE - PHYSICAL EXAMINATION
Vital signs as available reviewed.  General:  Uncomfortable due to pain and vomiting.  Head:  Normocephalic, atraumatic.  Eyes:  Conjunctiva pink, no icterus.  Cardiovascular:  Regular rate, no obvious murmur.  Respiratory:  Clear to auscultation, good air entry bilaterally.  Abdomen:  Soft, + tenderness to palpation left flank and RLQ.  Musculoskeletal:  No obvious deformity.  Neurologic: Alert and oriented, moving all extremities.  Skin:  Warm and dry.

## 2022-05-13 NOTE — ED PROVIDER NOTE - OBJECTIVE STATEMENT
66 F history asthma, kidney stones here complaining of abdominal pain. has some yesterday, but was mild. became severe today at 4 am. associated with chills and nausea, vomiting, urinary pressure and burning. tried ibuprofen at home. Had lithotripsy ~ 1 week ago with Dr. Moreira. PMD KATHERINE Monteiro.

## 2022-05-13 NOTE — H&P ADULT - CONVERSATION DETAILS
Diagnosis and treatment plan d/w patient  Pt states that if she is unable to make medical decisions for herself, she would like her  Schuyler Velasquez ([phone]) to make medical decisions on his behalf  Pt wishes to be full code Diagnosis and treatment plan d/w patient  Pt states that if she is unable to make medical decisions for herself, she would like her  Schuyler Velasquez ([phone]) to make medical decisions on his behalf  Pt wishes to be full code    Patients code status was discussed with patient at bedside. Goals of care were discussed with patient including the importance of having an advance directive and Health Care agent. Goals of care discussed included need for possible intubation and CPR if patient clinically deteriorates. Patient was informed about the role of a MOLST form.   17 minutes were spent discussing advanced care planning and this was separate from management of patients medical problems.

## 2022-05-13 NOTE — ED ADULT NURSE NOTE - CHIEF COMPLAINT QUOTE
SKYLER OSUNA ; 09/03/2020 ; NPP Neurology [facility]  SKYLER OSUNA ; 09/10/2020 ; NPP Neuro [address] [place] [address] SKYLER OSUNA ; 09/03/2020 ; NPP Neurology [address] [facility]  SKYLER OSUNA ; 09/10/2020 ; NPP Neuro [address] [place] [address] SKYLER OSUNA ; 09/03/2020 ; NPP Neurology [address] [facility]  SKYLER OSUNA ; 09/10/2020 ; NPP Neuro [address] [place] [address] SKYLER OSUNA ; 09/03/2020 ; NPP Neurology [facility]  SKYLER OSUNA ; 09/10/2020 ; NPP Neuro [address] [place] [address] left flank pain nausea s/p lithotripsy last week

## 2022-05-13 NOTE — ED PROVIDER NOTE - CLINICAL SUMMARY MEDICAL DECISION MAKING FREE TEXT BOX
here with abdominal pain, found to have kidney stone and diverticulitis. no UTI. recurrent pain, nausea, vomiting. will give antibiotics and admit.

## 2022-05-13 NOTE — H&P ADULT - NSHPPHYSICALEXAM_GEN_ALL_CORE
T(C): 36.1 (05-13-22 @ 08:14), Max: 36.1 (05-13-22 @ 08:14)  HR: 84 (05-13-22 @ 12:45) (83 - 84)  BP: 120/60 (05-13-22 @ 12:45) (120/60 - 122/71)  RR: 18 (05-13-22 @ 08:14) (18 - 18)  SpO2: 98% (05-13-22 @ 08:14) (98% - 98%)    GENERAL: patient appears well, no acute distress, appropriate, pleasant  EYES: sclera clear, no exudates  ENMT: oropharynx clear without erythema, no exudates, moist mucous membranes  NECK: supple, soft, no thyromegaly noted  LUNGS: good air entry bilaterally, clear to auscultation, symmetric breath sounds, no wheezing or rhonchi appreciated  HEART: soft S1/S2, regular rate and rhythm, no murmurs noted, no lower extremity edema  GASTROINTESTINAL: abdomen is soft, nondistended, normoactive bowel sounds, no palpable masses; B/l LQ abd tenderness anf b/l flank tenderness; No rebound tenderness, guarding, or rigidity  INTEGUMENT: good skin turgor, warm, dry, normal color for race.  MUSCULOSKELETAL: no clubbing or cyanosis, no obvious deformity  NEUROLOGIC: awake, alert, oriented x3, good muscle tone in 4 extremities, no obvious sensory deficits  PSYCHIATRIC: mood is good, affect is congruent, linear and logical thought process  HEME/LYMPH: no obvious ecchymosis or petechiae

## 2022-05-13 NOTE — H&P ADULT - PROBLEM SELECTOR PLAN 1
Pt presenting with 1 day of acute LQ abd pain, N/V, inability to tolerate PO  - CT scan showing acute uncomplicated sigmoid diverticulitis, mild leukocytosis but pt does not meet sepsis criteria  - Given IV Cipro/Flagyl in ED, continue IV for now while patient is NPO, can transition to PO once pt is on a diet  - NPO except meds for now; started IV NS @ 100 cc/hr x 48 hours, re-evaluate need for fluids pending clinical course and tolerance for PO diet  - Pain control with Tylenol 650 mg for mild, Dilaudid 0.25 mg q4h for moderate, and 0.5 mg q4h for severe   - Zofran prn for nausea  - Monitor abd exams, WBC count, fever curve Pt presenting with 1 day of acute LQ abd pain, N/V, inability to tolerate PO  - CT scan showing acute uncomplicated sigmoid diverticulitis, mild leukocytosis but pt does not meet sepsis criteria  - Given IV Cipro/Flagyl in ED, continue IV for now while patient is NPO, can transition to PO once pt is on a diet  - NPO except meds for now; started IV NS @ 100 cc/hr x 20 hours, re-evaluate need for fluids pending clinical course and tolerance for PO diet  - Pain control with Tylenol 650 mg for mild, Dilaudid 0.25 mg q4h for moderate, and 0.5 mg q4h for severe   - Zofran prn for nausea  - Monitor abd exams, WBC count, fever curve

## 2022-05-13 NOTE — H&P ADULT - ASSESSMENT
67 y/o F with a PMHx of anxiety, hypothyroidism, asthma, kidney stones (s/p lithotripsy on 5/4), who presents with 1 day of lower quadrant abd pain and associated flank pain, found to have acute uncomplicated sigmoid diverticulitis on CT scan, with concurrent b/l non-obstructing renal stones and R ureteral stone, admit for IV fluids and abx.

## 2022-05-13 NOTE — H&P ADULT - PROBLEM SELECTOR PLAN 3
Continue home albuterol inhaler 1 puff q6h prn, home Symbicort, and home Tessalon 200 mg TID prn for cough  - Pt tolerating RA, monitor respiratory status and routine pulse ox while inpatient

## 2022-05-13 NOTE — H&P ADULT - HISTORY OF PRESENT ILLNESS
65 y/o F with a PMHx of anxiety, hypothyroidism, asthma, kidney stones (s/p lithotripsy on 5/4), who presents with 1 day of lower quadrant abd pain and associated flank pain. Pt has a hx of recurrent renal stones, and has had several lithotripsies with Dr. Moreira, the most recent being on the left side on 5/4/22, with a right lithotripsy scheduled for 5/23. Pain started last night, and acutely worsened this morning at 4 AM, associated with nausea and over 10 episodes of NBNB vomiting, and inability to tolerate PO. Pt has chills, but denies any fevers, chest pain, palpitations, SOB, URI sx, change in bowel habits, hematochezia, or LE pain/swelling. Pt does have some dysuria, which is typical for when she gets kidney stones. Pt was recently on nitrofurantoin prior to lithotripsy. Pain is currently 8/10. Her last colonoscopy was 7 years ago, and she has never had diverticulosis/diverticulitis before.    ED course:  VS T 97F, HR 84, /60, RR 18, SpO2 98% on RA  labs significant for mild leukocytosis, glucose 280 on BMP, UA negative  CT A/P showing acute uncomplicated sigmoid diverticulitis, 4 mm mid-distal R ureteral calculus, b/l 3 mm non-obstructing renal calculi, no hydronephrosis  Incidental finding of 1 cm focal nodularity in RUQ c/w focal splenic artery aneurysm, stable since 2019  Given IV Tylenol, IV Dilaudid 0.5 mg x 2, Zofran 4 mg x 1, Reglan 10 mg x 1 in ED, 1 L NS bolus, IV Cipro and Flagyl x 1 67 y/o F with a PMHx of anxiety, hypothyroidism, asthma, kidney stones (s/p lithotripsy on 5/4), who presents with 1 day of lower quadrant abd pain and associated flank pain. Pt has a hx of recurrent renal stones, and has had several lithotripsies with Dr. Moreira, the most recent being on the left side on 5/4/22, with a right lithotripsy scheduled for 5/23. Pain started last night, and acutely worsened this morning at 4 AM, associated with nausea and over 10 episodes of NBNB vomiting, and inability to tolerate PO. Pt has chills, but denies any fevers, chest pain, palpitations, SOB, URI sx, change in bowel habits, hematochezia, or LE pain/swelling. Pt does have some dysuria, which is typical for when she gets kidney stones. Pt was recently on nitrofurantoin prior to lithotripsy. Pain is currently 8/10. Her last colonoscopy was 7 years ago, and she has never had diverticulosis/diverticulitis before.    ED course:  VS T 97F, HR 84, /60, RR 18, SpO2 98% on RA  labs significant for mild leukocytosis, UA negative  CT A/P showing acute uncomplicated sigmoid diverticulitis, 4 mm mid-distal R ureteral calculus, b/l 3 mm non-obstructing renal calculi, no hydronephrosis  Incidental finding of 1 cm focal nodularity in RUQ c/w focal splenic artery aneurysm, stable since 2019  Given IV Tylenol, IV Dilaudid 0.5 mg x 2, Zofran 4 mg x 1, Reglan 10 mg x 1 in ED, 1 L NS bolus, IV Cipro and Flagyl x 1 67 y/o F with a PMHx of anxiety, hypothyroidism, asthma, kidney stones (s/p lithotripsy on 5/4/22), who presents with 1 day of lower quadrant abd pain and associated flank pain. Pt has a hx of recurrent renal stones, and has had several lithotripsies with Dr. Moreira, the most recent being on the left side on 5/4/22, with a right lithotripsy scheduled for 5/23. Pain started last night, and acutely worsened this morning at 4 AM, associated with nausea and over 10 episodes of NBNB vomiting, and inability to tolerate PO. Pt has chills, but denies any fevers, chest pain, palpitations, SOB, URI sx, change in bowel habits, hematochezia, or LE pain/swelling. Pt does have some dysuria, which is typical for when she gets kidney stones. Pt was recently on nitrofurantoin prior to lithotripsy. Pain is currently 8/10. Her last colonoscopy was 7 years ago, and she has never had diverticulosis/diverticulitis before.    ED course:  VS T 97F, HR 84, /60, RR 18, SpO2 98% on RA  labs significant for mild leukocytosis, UA negative  CT A/P showing acute uncomplicated sigmoid diverticulitis, 4 mm mid-distal R ureteral calculus, b/l 3 mm non-obstructing renal calculi, no hydronephrosis  Incidental finding of 1 cm focal nodularity in RUQ c/w focal splenic artery aneurysm, stable since 2019  Given IV Tylenol, IV Dilaudid 0.5 mg x 2, Zofran 4 mg x 1, Reglan 10 mg x 1 in ED, 1 L NS bolus, IV Cipro and Flagyl x 1

## 2022-05-13 NOTE — H&P ADULT - PROBLEM SELECTOR PLAN 2
Pt with hx of recurrent kidney stones, follows with Dr. Moreira outpatient  - Recent lithotripsy for L-sided stone on 5/4, scheduled for R-sided lithotripsy on 5/23  - Will manage conservatively for now with IVF, pain nausea control as above  - Continue home Flomax 0.4 mg at bedtime  - UA negative but pt has some dysuria- likely 2/2 stones, but pt is on proper abx regimen to cover for UTI   - Strain urine Pt with hx of recurrent kidney stones, follows with Dr. Moreira outpatient  - Recent lithotripsy for L-sided stone on 5/4, scheduled for R-sided lithotripsy on 5/23  - Will manage conservatively for now with IVF, pain nausea control as above  - Continue home Flomax 0.4 mg at bedtime  - UA negative but pt has some dysuria and hematuria- likely 2/2 stones, but pt is on proper abx regimen to cover for UTI   - Strain urine  -f/u with urology next week as outpatient

## 2022-05-13 NOTE — H&P ADULT - ATTENDING COMMENTS
67 y/o F with a PMHx of anxiety, hypothyroidism, asthma, kidney stones (s/p lithotripsy on 5/4), who presents with 1 day of lower quadrant abd pain and associated flank pain, found to have acute uncomplicated sigmoid diverticulitis on CT scan, with concurrent b/l non-obstructing renal stones and R ureteral stone, admit for IV fluids and abx.    HPI as above. 67 y/o F with a PMHx of anxiety, hypothyroidism, asthma, kidney stones (s/p lithotripsy on 5/4), who presents with 1 day of lower quadrant abd pain and associated flank pain, found to have acute uncomplicated sigmoid diverticulitis on CT scan, with concurrent b/l non-obstructing renal stones and R ureteral stone, admit for IV fluids and abx.    HPI as above.    T(C): 36.1 (05-13-22 @ 08:14), Max: 36.1 (05-13-22 @ 08:14)  HR: 84 (05-13-22 @ 12:45) (83 - 84)  BP: 120/60 (05-13-22 @ 12:45) (120/60 - 122/71)  RR: 18 (05-13-22 @ 08:14) (18 - 18)  SpO2: 98% (05-13-22 @ 08:14) (98% - 98%)  Wt(kg): --    Physical Exam:   GENERAL: well-groomed, well-developed, NAD  HEENT: head NC/AT; EOM intact, PERRLA, conjunctiva & sclera clear; hearing grossly intact, moist mucous membranes  NECK: supple, no JVD  RESPIRATORY: CTA B/L, no wheezing, rales, rhonchi or rubs  CARDIOVASCULAR: S1&S2, RRR, no murmurs or gallops  ABDOMEN: soft, non-tender, non-distended, + Bowel sounds x4 quadrants, no guarding, rebound or rigidity  Back: No CVA tenderness b/l  MUSCULOSKELETAL:  no clubbing, cyanosis or edema of all 4 extremities  LYMPH: no cervical lymphadenopathy  VASCULAR: Radial pulses 2+ bilaterally, no varicose veins   SKIN: warm and dry, color normal  NEUROLOGIC: AA&O X3, CN2-12 intact w/ no focal deficits, no sensory loss, motor Strength 5/5 in UE & LE B/L  Psych: Normal mood and affect, normal behavior    Plan:   Acute uncomplicated diverticulitis: does not meet sepsis criteria.   -Keep NPO  -continue Cipro and Flagyl  -advance diet as tolerated  -IVF as ordered.   -patient informed of incidental findings and need for follow up with PCP within 1 month of hospital discharge.   -she was encouraged to f/u with her GI doctor to discuss need for a repeat colonoscopy in 8 weeks after resolution of her diverticulitis.     dvt ppx: lovenox

## 2022-05-13 NOTE — H&P ADULT - NSHPREVIEWOFSYSTEMS_GEN_ALL_CORE
CONSTITUTIONAL: denies fever, fatigue, weakness; + Chills  HEENT: denies blurred vision, sore throat  SKIN: denies new lesions, rash  CARDIOVASCULAR: denies chest pain, chest pressure, palpitations  RESPIRATORY: denies shortness of breath, sputum production  GASTROINTESTINAL: denies diarrhea, abdominal pain, melena, hematochezia; + Nausea, vomiting, decreased appetite  GENITOURINARY: denies discharge; + Dysuria, b/l flank pain  NEUROLOGICAL: denies numbness, headache, focal weakness  MUSCULOSKELETAL: denies new joint pain, muscle aches  HEMATOLOGIC: denies gross bleeding, bruising  LYMPHATICS: denies enlarged lymph nodes, extremity swelling  PSYCHIATRIC: denies recent changes in anxiety, depression  ENDOCRINOLOGIC: denies sweating, cold or heat intolerance

## 2022-05-13 NOTE — H&P ADULT - NSICDXPASTSURGICALHX_GEN_ALL_CORE_FT
PAST SURGICAL HISTORY:  Asthma      Delivery     History of Appendectomy     History of lumbar spinal fusion     Kidney Calculus- Lithotripsy x 3     S/P Hysterectomy     S/P Laparoscopic Cholecystectomy     S/P Total Thyroidectomy

## 2022-05-13 NOTE — ED PROVIDER NOTE - PROGRESS NOTE DETAILS
found to have diverticulitis and passing kidney stone. patient with recurrent pain, nausea, vomiting. Will admit for antibiotics, and meds.

## 2022-05-14 DIAGNOSIS — R74.01 ELEVATION OF LEVELS OF LIVER TRANSAMINASE LEVELS: ICD-10-CM

## 2022-05-14 LAB
A1C WITH ESTIMATED AVERAGE GLUCOSE RESULT: 5.3 % — SIGNIFICANT CHANGE UP (ref 4–5.6)
ALBUMIN SERPL ELPH-MCNC: 3.2 G/DL — LOW (ref 3.3–5)
ALP SERPL-CCNC: 128 U/L — HIGH (ref 40–120)
ALT FLD-CCNC: 275 U/L — HIGH (ref 12–78)
ANION GAP SERPL CALC-SCNC: 7 MMOL/L — SIGNIFICANT CHANGE UP (ref 5–17)
AST SERPL-CCNC: 161 U/L — HIGH (ref 15–37)
BASOPHILS # BLD AUTO: 0.01 K/UL — SIGNIFICANT CHANGE UP (ref 0–0.2)
BASOPHILS NFR BLD AUTO: 0.1 % — SIGNIFICANT CHANGE UP (ref 0–2)
BILIRUB SERPL-MCNC: 0.4 MG/DL — SIGNIFICANT CHANGE UP (ref 0.2–1.2)
BUN SERPL-MCNC: 9 MG/DL — SIGNIFICANT CHANGE UP (ref 7–23)
CALCIUM SERPL-MCNC: 8.7 MG/DL — SIGNIFICANT CHANGE UP (ref 8.5–10.1)
CHLORIDE SERPL-SCNC: 111 MMOL/L — HIGH (ref 96–108)
CO2 SERPL-SCNC: 24 MMOL/L — SIGNIFICANT CHANGE UP (ref 22–31)
CREAT SERPL-MCNC: 0.56 MG/DL — SIGNIFICANT CHANGE UP (ref 0.5–1.3)
CULTURE RESULTS: SIGNIFICANT CHANGE UP
EGFR: 101 ML/MIN/1.73M2 — SIGNIFICANT CHANGE UP
EOSINOPHIL # BLD AUTO: 0.02 K/UL — SIGNIFICANT CHANGE UP (ref 0–0.5)
EOSINOPHIL NFR BLD AUTO: 0.2 % — SIGNIFICANT CHANGE UP (ref 0–6)
ESTIMATED AVERAGE GLUCOSE: 105 MG/DL — SIGNIFICANT CHANGE UP (ref 68–114)
GLUCOSE SERPL-MCNC: 133 MG/DL — HIGH (ref 70–99)
HCT VFR BLD CALC: 36.2 % — SIGNIFICANT CHANGE UP (ref 34.5–45)
HCV AB S/CO SERPL IA: 0.15 S/CO — SIGNIFICANT CHANGE UP (ref 0–0.99)
HCV AB SERPL-IMP: SIGNIFICANT CHANGE UP
HGB BLD-MCNC: 12 G/DL — SIGNIFICANT CHANGE UP (ref 11.5–15.5)
IMM GRANULOCYTES NFR BLD AUTO: 0.2 % — SIGNIFICANT CHANGE UP (ref 0–1.5)
LYMPHOCYTES # BLD AUTO: 0.8 K/UL — LOW (ref 1–3.3)
LYMPHOCYTES # BLD AUTO: 9.1 % — LOW (ref 13–44)
MCHC RBC-ENTMCNC: 30 PG — SIGNIFICANT CHANGE UP (ref 27–34)
MCHC RBC-ENTMCNC: 33.1 GM/DL — SIGNIFICANT CHANGE UP (ref 32–36)
MCV RBC AUTO: 90.5 FL — SIGNIFICANT CHANGE UP (ref 80–100)
MONOCYTES # BLD AUTO: 0.26 K/UL — SIGNIFICANT CHANGE UP (ref 0–0.9)
MONOCYTES NFR BLD AUTO: 2.9 % — SIGNIFICANT CHANGE UP (ref 2–14)
NEUTROPHILS # BLD AUTO: 7.71 K/UL — HIGH (ref 1.8–7.4)
NEUTROPHILS NFR BLD AUTO: 87.5 % — HIGH (ref 43–77)
NRBC # BLD: 0 /100 WBCS — SIGNIFICANT CHANGE UP (ref 0–0)
PLATELET # BLD AUTO: 208 K/UL — SIGNIFICANT CHANGE UP (ref 150–400)
POTASSIUM SERPL-MCNC: 3.4 MMOL/L — LOW (ref 3.5–5.3)
POTASSIUM SERPL-SCNC: 3.4 MMOL/L — LOW (ref 3.5–5.3)
PROT SERPL-MCNC: 7.2 G/DL — SIGNIFICANT CHANGE UP (ref 6–8.3)
RBC # BLD: 4 M/UL — SIGNIFICANT CHANGE UP (ref 3.8–5.2)
RBC # FLD: 13.4 % — SIGNIFICANT CHANGE UP (ref 10.3–14.5)
SODIUM SERPL-SCNC: 142 MMOL/L — SIGNIFICANT CHANGE UP (ref 135–145)
SPECIMEN SOURCE: SIGNIFICANT CHANGE UP
WBC # BLD: 8.82 K/UL — SIGNIFICANT CHANGE UP (ref 3.8–10.5)
WBC # FLD AUTO: 8.82 K/UL — SIGNIFICANT CHANGE UP (ref 3.8–10.5)

## 2022-05-14 PROCEDURE — 76705 ECHO EXAM OF ABDOMEN: CPT | Mod: 26

## 2022-05-14 PROCEDURE — 99233 SBSQ HOSP IP/OBS HIGH 50: CPT | Mod: GC

## 2022-05-14 PROCEDURE — 99223 1ST HOSP IP/OBS HIGH 75: CPT

## 2022-05-14 RX ORDER — GENTAMICIN SULFATE 40 MG/ML
270 VIAL (ML) INJECTION ONCE
Refills: 0 | Status: COMPLETED | OUTPATIENT
Start: 2022-05-14 | End: 2022-05-14

## 2022-05-14 RX ORDER — METOCLOPRAMIDE HCL 10 MG
10 TABLET ORAL ONCE
Refills: 0 | Status: COMPLETED | OUTPATIENT
Start: 2022-05-14 | End: 2022-05-14

## 2022-05-14 RX ORDER — CEFTRIAXONE 500 MG/1
INJECTION, POWDER, FOR SOLUTION INTRAMUSCULAR; INTRAVENOUS
Refills: 0 | Status: DISCONTINUED | OUTPATIENT
Start: 2022-05-14 | End: 2022-05-17

## 2022-05-14 RX ORDER — SODIUM CHLORIDE 9 MG/ML
1000 INJECTION, SOLUTION INTRAVENOUS
Refills: 0 | Status: DISCONTINUED | OUTPATIENT
Start: 2022-05-14 | End: 2022-05-15

## 2022-05-14 RX ORDER — CEFTRIAXONE 500 MG/1
1000 INJECTION, POWDER, FOR SOLUTION INTRAMUSCULAR; INTRAVENOUS EVERY 24 HOURS
Refills: 0 | Status: DISCONTINUED | OUTPATIENT
Start: 2022-05-15 | End: 2022-05-17

## 2022-05-14 RX ORDER — ONDANSETRON 8 MG/1
4 TABLET, FILM COATED ORAL ONCE
Refills: 0 | Status: COMPLETED | OUTPATIENT
Start: 2022-05-14 | End: 2022-05-14

## 2022-05-14 RX ORDER — POTASSIUM CHLORIDE 20 MEQ
40 PACKET (EA) ORAL ONCE
Refills: 0 | Status: COMPLETED | OUTPATIENT
Start: 2022-05-14 | End: 2022-05-14

## 2022-05-14 RX ORDER — CEFTRIAXONE 500 MG/1
1000 INJECTION, POWDER, FOR SOLUTION INTRAMUSCULAR; INTRAVENOUS ONCE
Refills: 0 | Status: COMPLETED | OUTPATIENT
Start: 2022-05-14 | End: 2022-05-14

## 2022-05-14 RX ORDER — FAMOTIDINE 10 MG/ML
20 INJECTION INTRAVENOUS ONCE
Refills: 0 | Status: COMPLETED | OUTPATIENT
Start: 2022-05-14 | End: 2022-05-14

## 2022-05-14 RX ORDER — CALCIUM CARBONATE 500(1250)
1 TABLET ORAL ONCE
Refills: 0 | Status: COMPLETED | OUTPATIENT
Start: 2022-05-14 | End: 2022-05-14

## 2022-05-14 RX ADMIN — Medication 200 MILLIGRAM(S): at 00:16

## 2022-05-14 RX ADMIN — Medication 200 MILLIGRAM(S): at 15:55

## 2022-05-14 RX ADMIN — Medication 137 MICROGRAM(S): at 06:07

## 2022-05-14 RX ADMIN — ONDANSETRON 4 MILLIGRAM(S): 8 TABLET, FILM COATED ORAL at 11:55

## 2022-05-14 RX ADMIN — Medication 100 MILLIGRAM(S): at 09:21

## 2022-05-14 RX ADMIN — TAMSULOSIN HYDROCHLORIDE 0.4 MILLIGRAM(S): 0.4 CAPSULE ORAL at 00:17

## 2022-05-14 RX ADMIN — Medication 200 MILLIGRAM(S): at 14:47

## 2022-05-14 RX ADMIN — Medication 100 MILLIGRAM(S): at 00:08

## 2022-05-14 RX ADMIN — HYDROMORPHONE HYDROCHLORIDE 0.25 MILLIGRAM(S): 2 INJECTION INTRAMUSCULAR; INTRAVENOUS; SUBCUTANEOUS at 00:17

## 2022-05-14 RX ADMIN — Medication 100 MILLIGRAM(S): at 22:29

## 2022-05-14 RX ADMIN — ONDANSETRON 4 MILLIGRAM(S): 8 TABLET, FILM COATED ORAL at 04:20

## 2022-05-14 RX ADMIN — HYDROMORPHONE HYDROCHLORIDE 0.5 MILLIGRAM(S): 2 INJECTION INTRAMUSCULAR; INTRAVENOUS; SUBCUTANEOUS at 09:26

## 2022-05-14 RX ADMIN — SODIUM CHLORIDE 100 MILLILITER(S): 9 INJECTION INTRAMUSCULAR; INTRAVENOUS; SUBCUTANEOUS at 04:36

## 2022-05-14 RX ADMIN — Medication 1 MILLIGRAM(S): at 22:45

## 2022-05-14 RX ADMIN — Medication 100 MILLIGRAM(S): at 14:40

## 2022-05-14 RX ADMIN — HYDROMORPHONE HYDROCHLORIDE 0.25 MILLIGRAM(S): 2 INJECTION INTRAMUSCULAR; INTRAVENOUS; SUBCUTANEOUS at 11:55

## 2022-05-14 RX ADMIN — CEFTRIAXONE 1000 MILLIGRAM(S): 500 INJECTION, POWDER, FOR SOLUTION INTRAMUSCULAR; INTRAVENOUS at 08:56

## 2022-05-14 RX ADMIN — HYDROMORPHONE HYDROCHLORIDE 0.5 MILLIGRAM(S): 2 INJECTION INTRAMUSCULAR; INTRAVENOUS; SUBCUTANEOUS at 16:45

## 2022-05-14 RX ADMIN — ONDANSETRON 4 MILLIGRAM(S): 8 TABLET, FILM COATED ORAL at 00:20

## 2022-05-14 RX ADMIN — ENOXAPARIN SODIUM 40 MILLIGRAM(S): 100 INJECTION SUBCUTANEOUS at 16:38

## 2022-05-14 RX ADMIN — ONDANSETRON 4 MILLIGRAM(S): 8 TABLET, FILM COATED ORAL at 08:56

## 2022-05-14 RX ADMIN — HYDROMORPHONE HYDROCHLORIDE 0.5 MILLIGRAM(S): 2 INJECTION INTRAMUSCULAR; INTRAVENOUS; SUBCUTANEOUS at 16:37

## 2022-05-14 RX ADMIN — SODIUM CHLORIDE 100 MILLILITER(S): 9 INJECTION, SOLUTION INTRAVENOUS at 18:48

## 2022-05-14 RX ADMIN — TAMSULOSIN HYDROCHLORIDE 0.4 MILLIGRAM(S): 0.4 CAPSULE ORAL at 22:30

## 2022-05-14 RX ADMIN — Medication 10 MILLIGRAM(S): at 05:03

## 2022-05-14 RX ADMIN — FAMOTIDINE 20 MILLIGRAM(S): 10 INJECTION INTRAVENOUS at 08:59

## 2022-05-14 RX ADMIN — FAMOTIDINE 20 MILLIGRAM(S): 10 INJECTION INTRAVENOUS at 04:05

## 2022-05-14 RX ADMIN — HYDROMORPHONE HYDROCHLORIDE 0.5 MILLIGRAM(S): 2 INJECTION INTRAMUSCULAR; INTRAVENOUS; SUBCUTANEOUS at 04:10

## 2022-05-14 RX ADMIN — Medication 10 MILLIGRAM(S): at 20:43

## 2022-05-14 NOTE — CONSULT NOTE ADULT - SUBJECTIVE AND OBJECTIVE BOX
Request for consultation received  Chart/Labs/Imaging reviewed  Assessment to follow.  Damián Piña MD  [phone]  ------------------------------  Full note to follow  66F with recent lithotripsy.  Diverticulitis on CT here. Abdominal exam benign but pain was severe PTA x1-2 days  Patient states intermittent abdominal pain ~1.5 months that in retrospect may have been diverticulitis.   had CT ~1 mo at [facility] that showed diverticulitis but her internist apparently did not think so  Tolerates amoxicillin  Gets nausea from Cipro, may be contributing now  Only recent antibiotic was macrobid & Rx for lithotripsy  Anxiety bothering her, asking to be put back on her Klonopin or equivalent  Change cipro to ceftriaxone  Gentamicin 5mg/kg x1  Trend LFT- s/p cholecystectomy- related to acute infection?  F/U Cx data  Serial abdominal exams  Antiemetics  Analgesics as able  NPO  Thank you for the courtesy of this referral.  Damián Piña MD  Attending Physician  [facility]  Division of Infectious Diseases  [phone]  -----------------------  [facility]  Division of Infectious Diseases  [phone]    ROBERTOMARI SCRUGGS  66y, Female  [id number]    HPI--      PMH/PSH--  Hypothyroidism    Migraines    Anxiety    Kidney Calculi    Obesity    Fibromyalgia    Kidney Calculus- Lithotripsy x 3    S/P Hysterectomy     Delivery    S/P Total Thyroidectomy    History of Appendectomy    Asthma    S/P Laparoscopic Cholecystectomy    History of lumbar spinal fusion        Allergies--  codeine (Anaphylaxis; Hives)  fish (Anaphylaxis)  latex (Anaphylaxis)  morphine (Nausea; Vomiting)  penicillin (Hives)    PCN allergy incorrect.  has had hives from PCN > 10 years ago, but has tolerated amoxicillin since with only some GI upset    Medications--  Antibiotics: ciprofloxacin   IVPB 400 milliGRAM(s) IV Intermittent every 12 hours  metroNIDAZOLE  IVPB 500 milliGRAM(s) IV Intermittent every 8 hours    Immunologic:   Other: acetaminophen     Tablet .. PRN  ALBUTerol    90 MICROgram(s) HFA Inhaler PRN  benzonatate PRN  budesonide 160 MICROgram(s)/formoterol 4.5 MICROgram(s) Inhaler  clonazePAM  Tablet PRN  enoxaparin Injectable  HYDROmorphone  Injectable PRN  HYDROmorphone  Injectable PRN  levothyroxine  melatonin PRN  montelukast  ondansetron Injectable PRN  sodium chloride 0.9%.  tamsulosin    Antimicrobials last 90 days per EMR: MEDICATIONS  (STANDING):  ciprofloxacin   IVPB   200 mL/Hr IV Intermittent (22 @ 00:16)    ciprofloxacin   IVPB   200 mL/Hr IV Intermittent (22 @ 12:00)    metroNIDAZOLE  IVPB   100 mL/Hr IV Intermittent (22 @ 11:00)    metroNIDAZOLE  IVPB   100 mL/Hr IV Intermittent (22 @ 00:08)        Social History--  EtOH: denies   Tobacco: denies   Drug Use: denies     Family/Marital History--        Travel/Environmental/Occupational History:  Former  for a union  Recent travel to [place]    Review of Systems:  A >=10-point review of systems was obtained.     Pertinent positives and negatives--  Constitutional: No fevers. No Chills. No Rigors.   Eyes:  ENMT:  Cardiovascular: No chest pain. No palpitations.  Respiratory: No shortness of breath. No cough.  Gastrointestinal: No nausea or vomiting. No diarrhea or constipation.   Genitourinary:  Musculoskeletal:  Skin:  Neurologic:  Psychiatric: Pleasant. Appropriate affect.  Endocrine:  Heme/Lymphatic:  Allergy/Immunologic:    Review of systems otherwise negative except as previously noted.    Physical Exam--  Vital Signs: T(F): 97.6 (22 @ 17:30), Max: 97.6 (22 @ 17:30)  HR: 64 (22 @ 17:30)  BP: 114/68 (22 @ 17:30)  RR: 16 (22 @ 17:30)  SpO2: 98% (22 @ 17:30)  Wt(kg): --  General: Nontoxic-appearing Female in no acute distress.  HEENT: AT/NC. PERRL. EOMI. Anicteric. Conjunctiva pink and moist. Oropharynx clear. Dentition fair.  Neck: Not rigid. No sense of mass.  Nodes: None palpable.  Lungs: Clear bilaterally without rales, wheezing or rhonchi  Heart: Regular rate and rhythm. No Murmur. No rub. No gallop. No palpable thrill.  Abdomen: Bowel sounds present and normoactive. Soft. Nondistended. Nontender. No sense of mass. No organomegaly.  Back: No spinal tenderness. No costovertebral angle tenderness.   Extremities: No cyanosis or clubbing. No edema.   Skin: Warm. Dry. Good turgor. No rash. No vasculitic stigmata.  Psychiatric: Appropriate affect and mood for situation.         Laboratory & Imaging Data--  CBC                        12.0   8.82  )-----------( 208      ( 14 May 2022 06:22 )             36.2       Chemistries      142  |  111<H>  |  9   ----------------------------<  133<H>  3.4<L>   |  24  |  0.56    Ca    8.7      14 May 2022 06:22    TPro  7.2  /  Alb  3.2<L>  /  TBili  0.4  /  DBili  x   /  AST  161<H>  /  ALT  275<H>  /  AlkPhos  128<H>        Culture Data       Full note to follow  66F with recent lithotripsy.  Diverticulitis on CT here. Abdominal exam benign but pain was severe PTA x1-2 days  Patient states intermittent abdominal pain ~1.5 months that in retrospect may have been diverticulitis.  States had CT ~1 mo at [facility] that showed diverticulitis but her internist apparently did not think so  Tolerates amoxicillin  Gets nausea from Cipro, may be contributing now  Only recent antibiotic was macrobid & Rx for lithotripsy  Anxiety bothering her, asking to be put back on her Klonopin or equivalent  Change cipro to ceftriaxone  Gentamicin 5mg/kg x1  Trend LFT- s/p cholecystectomy- related to acute infection?  F/U Cx data  Serial abdominal exams  Antiemetics  Analgesics as able  NPO  Thank you for the courtesy of this referral.  Damián Piña MD  Attending Physician  [facility]  Division of Infectious Diseases  [phone]  -----------------------  [facility]  Division of Infectious Diseases  [phone]    MARI ROBERTO  66y, Female  [id number]    HPI--  This is a 66-year-old woman with a medical history significant for anxiety, nephrolithiasis, recent lithotripsy, who presents with severe abdominal pain diffusely but primarily in the in the left lower quadrant accompanied by nausea and vomiting for the past 1 to 2 days.  She denies any urinary symptoms.  She has no shortness of breath or chest pain.  She denies any myalgias or arthralgias.  She did have chills along with fever.    Here CT scan of the abdomen pelvis was performed that reveals changes consistent with diverticulitis.    In retrospect she has been having abdominal pain intermittently for the last 1-1/2 months.  She states she had a CAT scan done about 1 month ago at [facility] that reportedly showed potentially consistent with diverticulitis, but her internist did not think the clinical context matched.    Her main complaint presently is nausea and vomiting.  She thinks is related to the Cipro.  She has had nausea related to the Cipro previously.    She does not know exactly what antibiotic she was taking related to the lithotripsy.  She did receive a brief course of Macrobid..    PMH/PSH--  Hypothyroidism    Migraines    Anxiety    Kidney Calculi    Obesity    Fibromyalgia    Kidney Calculus- Lithotripsy x 3    S/P Hysterectomy     Delivery    S/P Total Thyroidectomy    History of Appendectomy    Asthma    S/P Laparoscopic Cholecystectomy    History of lumbar spinal fusion        Allergies--  codeine (Anaphylaxis; Hives)  fish (Anaphylaxis)  latex (Anaphylaxis)  morphine (Nausea; Vomiting)  penicillin (Hives)    PCN allergy incorrect. States has had hives from PCN > 10 years ago, but has tolerated amoxicillin since with only some GI upset    Medications--  Antibiotics: ciprofloxacin   IVPB 400 milliGRAM(s) IV Intermittent every 12 hours  metroNIDAZOLE  IVPB 500 milliGRAM(s) IV Intermittent every 8 hours    Immunologic:   Other: acetaminophen     Tablet .. PRN  ALBUTerol    90 MICROgram(s) HFA Inhaler PRN  benzonatate PRN  budesonide 160 MICROgram(s)/formoterol 4.5 MICROgram(s) Inhaler  clonazePAM  Tablet PRN  enoxaparin Injectable  HYDROmorphone  Injectable PRN  HYDROmorphone  Injectable PRN  levothyroxine  melatonin PRN  montelukast  ondansetron Injectable PRN  sodium chloride 0.9%.  tamsulosin    Antimicrobials last 90 days per EMR: MEDICATIONS  (STANDING):  ciprofloxacin   IVPB   200 mL/Hr IV Intermittent (22 @ 00:16)    ciprofloxacin   IVPB   200 mL/Hr IV Intermittent (22 @ 12:00)    metroNIDAZOLE  IVPB   100 mL/Hr IV Intermittent (22 @ 11:00)    metroNIDAZOLE  IVPB   100 mL/Hr IV Intermittent (22 @ 00:08)        Social History--  EtOH: denies   Tobacco: denies   Drug Use: denies     Family/Marital History--  Not relevant to clinical concern      Travel/Environmental/Occupational History:  Former  for a union  Recent travel to [place]    Review of Systems:  A >=10-point review of systems was obtained.   Review of systems otherwise negative except as previously noted.    Physical Exam--  Vital Signs: T(F): 97.6 (22 @ 17:30), Max: 97.6 (22 @ 17:30)  HR: 64 (22 @ 17:30)  BP: 114/68 (22 @ 17:30)  RR: 16 (22 @ 17:30)  SpO2: 98% (22 @ 17:30)  Wt(kg): --  General: Nontoxic-appearing Female in no acute distress.  HEENT: AT/NC. Anicteric. Conjunctiva pink and moist. Oropharynx clear. Dentition fair.  Neck: Not rigid. No sense of mass.  Nodes: None palpable.  Lungs: Clear bilaterally without rales, wheezing or rhonchi  Heart: Regular rate and rhythm. No Murmur. No rub. No gallop. No palpable thrill.  Abdomen: Bowel sounds present and normoactive. Soft. Nondistended. Nontender. No sense of mass. No organomegaly.  Back: No spinal tenderness. No costovertebral angle tenderness.   Extremities: No cyanosis or clubbing. No edema.   Skin: Warm. Dry. Good turgor. No rash. No vasculitic stigmata.  Psychiatric: Appropriate affect and mood for situation.         Laboratory & Imaging Data--  CBC                        12.0   8.82  )-----------( 208      ( 14 May 2022 06:22 )             36.2       Chemistries      142  |  111<H>  |  9   ----------------------------<  133<H>  3.4<L>   |  24  |  0.56    Ca    8.7      14 May 2022 06:22    TPro  7.2  /  Alb  3.2<L>  /  TBili  0.4  /  DBili  x   /  AST  161<H>  /  ALT  275<H>  /  AlkPhos  128<H>        Culture Data  No data    < from: CT Abdomen and Pelvis No Cont (22 @ 09:29) >  ACC: [id number] EXAM:  CT ABDOMEN AND PELVIS                        PROCEDURE DATE:  2022    INTERPRETATION:  CLINICAL INFORMATION: Abdominal pain, renal calculus,   recent lithotripsy  COMPARISON: CT abdomen pelvis 2019  CONTRAST/COMPLICATIONS:  IV Contrast: NONE  Oral Contrast: NONE  Complications: None reported at time of study completion    PROCEDURE:  CT of the Abdomen and Pelvis was performed.  Sagittal and coronal reformats were performed.    FINDINGS:  LOWER CHEST: No visualized pleural effusion  Solid organ evaluation limited due to noncontrast technique.    LIVER: Liver size within normal limits. Left hepatic lobe cystic lesion   measures 1.9 cm, previously 1.2 cm on 2019. Additional   hypodensitiesof the liver are too small to characterize, but are similar   to prior study. Abdominal MRI can be obtained for further   characterization on nonemergency basis.  BILE DUCTS: No intrahepatic biliary distention. Common bile duct   prominence is compatible with postcholecystectomy status.  GALLBLADDER: Cholecystectomy.  SPLEEN: Normal size  PANCREAS: No main ductal dilatation  ADRENALS: Adrenal gland thickening on the left.  KIDNEYS/URETERS: No hydronephrosis. There is a mid to distal right   ureteral calculus measuring 4 mm. Several additional bilateral   nonobstructing renal calculi up to 3 mm. Subcentimeter dense focus of the   left kidney on image 26-27 series 2 could reflect area of mineralization   or hemorrhagic/proteinaceous cyst.  BLADDER: Underdistended  REPRODUCTIVE ORGANS: Hysterectomy.  BOWEL: Small hiatal hernia. Stomach is underdistended. No small bowel   distention. Mild stool burden of the colon limits evaluation of the   colonic mucosa. Colonic diverticulosis. Focal short segment inflammation   adjacent to sigmoid diverticulum on image 77 series 2 concerning for   acute uncomplicated diverticulitis.  PERITONEUM: Trace left lower quadrant fluid. No abscess or free air.  VESSELS: No aneurysm of the abdominal aorta. Atheromatous change. There   is a 1 cm nodular density in the left upper quadrant adjacent to the   stomach and left adrenal gland, tubular in orientation on sagittal   images, favored represent a focal splenic artery aneurysm, stable in size   since 2019. Correlate with postcontrast imaging on nonemergency basis for   characterization.  RETROPERITONEUM/LYMPH NODES: Small volume nodes.  ABDOMINAL WALL: Tiny fat-containing umbilical hernia.  BONES: Postsurgical changes of the lumbar laminectomy and fusion.   Degenerative changes, osseous demineralization, and scoliosis. Central   canal and neural foramina are not adequately assessed on this study.    IMPRESSION:    Limited noncontrast study.    Acute uncomplicated diverticulitis of the sigmoid colon.    No hydronephrosis of the kidneys. 4 mm mid to distal right ureteral   calculus. Additional nonobstructing bilateral renal calculi up to 3 mm.    1 cm nodular density in the left upper quadrant adjacent to the stomach   and left adrenal gland,tubular in orientation on sagittal images,   favored to represent a focal splenic artery aneurysm, stable in size   since 2019. Correlate with postcontrast imaging on nonemergency basis for   characterization.    --- End of Report ---    NO BEAR M.D., ATTENDING RADIOLOGIST  This document has been electronically signed. May 13 2022 10:21AM    < end of copied text >

## 2022-05-14 NOTE — PROGRESS NOTE ADULT - SUBJECTIVE AND OBJECTIVE BOX
Patient is a 66y old  Female who presents with a chief complaint of Diverticulitis (14 May 2022 07:24)      INTERVAL HPI/OVERNIGHT EVENTS: Patient admitted overnight. Seen and examined at bedside this morning. Admits to b/l lower quadrant abdominal pain and nausea/vomiting. She reports having three episodes of emesis this morning. Denies headache, dizziness, chest pain, shortness of breath.    MEDICATIONS  (STANDING):  budesonide 160 MICROgram(s)/formoterol 4.5 MICROgram(s) Inhaler 2 Puff(s) Inhalation two times a day  cefTRIAXone   IVPB      enoxaparin Injectable 40 milliGRAM(s) SubCutaneous every 24 hours  gentamicin   IVPB 270 milliGRAM(s) IV Intermittent once  levothyroxine 137 MICROGram(s) Oral daily  metroNIDAZOLE  IVPB 500 milliGRAM(s) IV Intermittent every 8 hours  montelukast 10 milliGRAM(s) Oral daily  sodium chloride 0.9%. 1000 milliLiter(s) (100 mL/Hr) IV Continuous <Continuous>  tamsulosin 0.4 milliGRAM(s) Oral at bedtime    MEDICATIONS  (PRN):  acetaminophen     Tablet .. 650 milliGRAM(s) Oral every 6 hours PRN Temp greater or equal to 38C (100.4F), Mild Pain (1 - 3)  ALBUTerol    90 MICROgram(s) HFA Inhaler 1 Puff(s) Inhalation every 6 hours PRN Shortness of Breath and/or Wheezing  benzonatate 200 milliGRAM(s) Oral three times a day PRN Cough  clonazePAM  Tablet 1 milliGRAM(s) Oral at bedtime PRN Anxiety or Insomnia  HYDROmorphone  Injectable 0.25 milliGRAM(s) IV Push every 4 hours PRN Moderate Pain (4 - 6)  HYDROmorphone  Injectable 0.5 milliGRAM(s) IV Push every 4 hours PRN Severe Pain (7 - 10)  melatonin 3 milliGRAM(s) Oral at bedtime PRN Insomnia  ondansetron Injectable 4 milliGRAM(s) IV Push every 4 hours PRN Nausea and/or Vomiting      Allergies    codeine (Anaphylaxis; Hives)  fish (Anaphylaxis)  latex (Anaphylaxis)  morphine (Nausea; Vomiting)  penicillin (Hives)    Intolerances        REVIEW OF SYSTEMS:  HEENT: No headache  RESPIRATORY: No shortness of breath  CARDIOVASCULAR: No chest pain  GASTROINTESTINAL: Admits pain, nausea, vomiting  NEUROLOGICAL: no dizziness     Vital Signs Last 24 Hrs  T(C): 36.5 (14 May 2022 09:41), Max: 36.5 (14 May 2022 09:41)  T(F): 97.7 (14 May 2022 09:41), Max: 97.7 (14 May 2022 09:41)  HR: 73 (14 May 2022 09:41) (64 - 73)  BP: 114/67 (14 May 2022 09:41) (114/67 - 114/68)  BP(mean): --  RR: 16 (14 May 2022 09:41) (16 - 16)  SpO2: 100% (14 May 2022 09:41) (98% - 100%)    PHYSICAL EXAM:  GENERAL: in mild distress, seated on edge of bed with emesis bag  HEENT: NCAT, anicteric  CHEST/LUNG:  CTA b/l, no rales, wheezes, or rhonchi  HEART:  RRR, S1, S2  ABDOMEN:  tenderness to palpation in b/l lower quadrants and b/l flanks  EXTREMITIES: no edema, cyanosis, or calf tenderness  NERVOUS SYSTEM: answers questions and follows commands appropriately    LABS:                        12.0   8.82  )-----------( 208      ( 14 May 2022 06:22 )             36.2     CBC Full  -  ( 14 May 2022 06:22 )  WBC Count : 8.82 K/uL  Hemoglobin : 12.0 g/dL  Hematocrit : 36.2 %  Platelet Count - Automated : 208 K/uL  Mean Cell Volume : 90.5 fl  Mean Cell Hemoglobin : 30.0 pg  Mean Cell Hemoglobin Concentration : 33.1 gm/dL  Auto Neutrophil # : 7.71 K/uL  Auto Lymphocyte # : 0.80 K/uL  Auto Monocyte # : 0.26 K/uL  Auto Eosinophil # : 0.02 K/uL  Auto Basophil # : 0.01 K/uL  Auto Neutrophil % : 87.5 %  Auto Lymphocyte % : 9.1 %  Auto Monocyte % : 2.9 %  Auto Eosinophil % : 0.2 %  Auto Basophil % : 0.1 %    14 May 2022 06:22    142    |  111    |  9      ----------------------------<  133    3.4     |  24     |  0.56     Ca    8.7        14 May 2022 06:22    TPro  7.2    /  Alb  3.2    /  TBili  0.4    /  DBili  x      /  AST  161    /  ALT  275    /  AlkPhos  128    14 May 2022 06:22    PT/INR - ( 13 May 2022 08:56 )   PT: 11.0 sec;   INR: 0.94 ratio         PTT - ( 13 May 2022 08:56 )  PTT:31.5 sec  Urinalysis Basic - ( 13 May 2022 09:50 )    Color: Yellow / Appearance: Clear / S.020 / pH: x  Gluc: x / Ketone: Negative  / Bili: Negative / Urobili: Negative   Blood: x / Protein: 15 / Nitrite: Negative   Leuk Esterase: Negative / RBC: 6-10 /HPF / WBC 0-2   Sq Epi: x / Non Sq Epi: Occasional / Bacteria: Negative      CAPILLARY BLOOD GLUCOSE            Culture - Urine (collected 22 @ 12:33)  Source: Clean Catch Clean Catch (Midstream)  Final Report (22 @ 11:19):    <10,000 CFU/mL Normal Urogenital Katarina        RADIOLOGY & ADDITIONAL TESTS:    Personally reviewed.     Consultant(s) Notes Reviewed:  [x] YES  [ ] NO

## 2022-05-14 NOTE — PROGRESS NOTE ADULT - ASSESSMENT
67 y/o F with a PMHx of anxiety, hypothyroidism, asthma, kidney stones (s/p lithotripsy on 5/4) admitted for acute sigmoid diverticulitis with concurrent b/l non-obstructing renal stones and R ureteral stone, on IV Cipro/Flagyl.

## 2022-05-14 NOTE — PROGRESS NOTE ADULT - PROBLEM SELECTOR PLAN 3
Patient with elevated LFTs today  - RUQ US: Prior cholecystectomy. Mildly prominent common bile duct likely normal for age and postcholecystectomy state however correlate with bilirubin levels. Increased echogenicity of the hepatic parenchyma suggesting hepatic steatosis.  - Bilirubin wnl  - Avoid hepatotoxic meds  - Trend LFTs  - Consider GI consult for worsening LFTs

## 2022-05-14 NOTE — PROGRESS NOTE ADULT - PROBLEM SELECTOR PLAN 7
Pt with "Left hepatic lobe cystic lesion measures 1.9 cm, previously 1.2 cm on 11/13/2019" and "1 cm nodular density in the left upper quadrant adjacent to the stomach and left adrenal gland, tubular in orientation on sagittal images, favored to represent a focal splenic artery aneurysm, stable in size since 2019  - Pt notified of imaging findings  - Patient informed she will need MRI outpatient on a non-emergent basis for further characterization  - Subcentimeter dense focus of the left kidney on image 26-27 series 2 could reflect area of mineralization   or hemorrhagic/proteinaceous cyst. Follow up with Dr. Moreira

## 2022-05-14 NOTE — PROGRESS NOTE ADULT - PROBLEM SELECTOR PLAN 5
Pt on Klonopin 1 mg TID prn at home, but only takes medication once a day at bedtime if needed  - Continue at 1 mg at bedtime prn for now, can increase frequency if pt has uncontrolled anxiety  - Added melatonin 3 mg prn at bedtime for insomnia

## 2022-05-14 NOTE — PROGRESS NOTE ADULT - PROBLEM SELECTOR PLAN 4
Continue home albuterol inhaler 1 puff q6h prn, home Symbicort, and home Tessalon 200 mg TID prn for cough

## 2022-05-14 NOTE — PROGRESS NOTE ADULT - PROBLEM SELECTOR PLAN 1
Pt presenting with 1 day of acute LQ abd pain, N/V, inability to tolerate PO  - CT scan showing acute uncomplicated sigmoid diverticulitis, mild leukocytosis but pt does not meet sepsis criteria  - Given IV Cipro/Flagyl in ED, continue IV for now while patient is NPO, can transition to PO once pt is on a diet  - NPO except meds for now; started IV NS @ 100 cc/hr x 20 hours, re-evaluate need for fluids pending clinical course and tolerance for PO diet  - Pain control with Tylenol 650 mg for mild, Dilaudid 0.25 mg q4h for moderate, and 0.5 mg q4h for severe   - Zofran prn for nausea  - Monitor abd exams, WBC count, fever curve

## 2022-05-14 NOTE — PROGRESS NOTE ADULT - PROBLEM SELECTOR PLAN 2
Pt with hx of recurrent kidney stones, follows with Dr. Moreira outpatient  - Recent lithotripsy for L-sided stone on 5/4, scheduled for R-sided lithotripsy on 5/23  - Will manage conservatively for now with IVF, pain nausea control as above  - Continue home Flomax 0.4 mg at bedtime  - UA negative but pt has some dysuria and hematuria- likely 2/2 stones, but pt is on proper abx regimen to cover for UTI   - Strain urine  - F/u with urology next week as outpatient

## 2022-05-15 DIAGNOSIS — D64.9 ANEMIA, UNSPECIFIED: ICD-10-CM

## 2022-05-15 DIAGNOSIS — N23 UNSPECIFIED RENAL COLIC: ICD-10-CM

## 2022-05-15 LAB
ALBUMIN SERPL ELPH-MCNC: 2.8 G/DL — LOW (ref 3.3–5)
ALP SERPL-CCNC: 94 U/L — SIGNIFICANT CHANGE UP (ref 40–120)
ALT FLD-CCNC: 152 U/L — HIGH (ref 12–78)
ANION GAP SERPL CALC-SCNC: 6 MMOL/L — SIGNIFICANT CHANGE UP (ref 5–17)
AST SERPL-CCNC: 42 U/L — HIGH (ref 15–37)
BASOPHILS # BLD AUTO: 0.02 K/UL — SIGNIFICANT CHANGE UP (ref 0–0.2)
BASOPHILS NFR BLD AUTO: 0.2 % — SIGNIFICANT CHANGE UP (ref 0–2)
BILIRUB SERPL-MCNC: 0.3 MG/DL — SIGNIFICANT CHANGE UP (ref 0.2–1.2)
BUN SERPL-MCNC: 11 MG/DL — SIGNIFICANT CHANGE UP (ref 7–23)
CALCIUM SERPL-MCNC: 8.7 MG/DL — SIGNIFICANT CHANGE UP (ref 8.5–10.1)
CHLORIDE SERPL-SCNC: 116 MMOL/L — HIGH (ref 96–108)
CO2 SERPL-SCNC: 25 MMOL/L — SIGNIFICANT CHANGE UP (ref 22–31)
CREAT SERPL-MCNC: 0.43 MG/DL — LOW (ref 0.5–1.3)
EGFR: 108 ML/MIN/1.73M2 — SIGNIFICANT CHANGE UP
EOSINOPHIL # BLD AUTO: 0.03 K/UL — SIGNIFICANT CHANGE UP (ref 0–0.5)
EOSINOPHIL NFR BLD AUTO: 0.3 % — SIGNIFICANT CHANGE UP (ref 0–6)
GLUCOSE SERPL-MCNC: 108 MG/DL — HIGH (ref 70–99)
HCT VFR BLD CALC: 31.7 % — LOW (ref 34.5–45)
HGB BLD-MCNC: 10.4 G/DL — LOW (ref 11.5–15.5)
IMM GRANULOCYTES NFR BLD AUTO: 0.4 % — SIGNIFICANT CHANGE UP (ref 0–1.5)
LYMPHOCYTES # BLD AUTO: 1.9 K/UL — SIGNIFICANT CHANGE UP (ref 1–3.3)
LYMPHOCYTES # BLD AUTO: 19.2 % — SIGNIFICANT CHANGE UP (ref 13–44)
MCHC RBC-ENTMCNC: 30.2 PG — SIGNIFICANT CHANGE UP (ref 27–34)
MCHC RBC-ENTMCNC: 32.8 GM/DL — SIGNIFICANT CHANGE UP (ref 32–36)
MCV RBC AUTO: 92.2 FL — SIGNIFICANT CHANGE UP (ref 80–100)
MONOCYTES # BLD AUTO: 0.69 K/UL — SIGNIFICANT CHANGE UP (ref 0–0.9)
MONOCYTES NFR BLD AUTO: 7 % — SIGNIFICANT CHANGE UP (ref 2–14)
NEUTROPHILS # BLD AUTO: 7.23 K/UL — SIGNIFICANT CHANGE UP (ref 1.8–7.4)
NEUTROPHILS NFR BLD AUTO: 72.9 % — SIGNIFICANT CHANGE UP (ref 43–77)
NRBC # BLD: 0 /100 WBCS — SIGNIFICANT CHANGE UP (ref 0–0)
PLATELET # BLD AUTO: 221 K/UL — SIGNIFICANT CHANGE UP (ref 150–400)
POTASSIUM SERPL-MCNC: 3.4 MMOL/L — LOW (ref 3.5–5.3)
POTASSIUM SERPL-SCNC: 3.4 MMOL/L — LOW (ref 3.5–5.3)
PROT SERPL-MCNC: 6.2 G/DL — SIGNIFICANT CHANGE UP (ref 6–8.3)
RBC # BLD: 3.44 M/UL — LOW (ref 3.8–5.2)
RBC # FLD: 14.1 % — SIGNIFICANT CHANGE UP (ref 10.3–14.5)
SODIUM SERPL-SCNC: 147 MMOL/L — HIGH (ref 135–145)
WBC # BLD: 9.91 K/UL — SIGNIFICANT CHANGE UP (ref 3.8–10.5)
WBC # FLD AUTO: 9.91 K/UL — SIGNIFICANT CHANGE UP (ref 3.8–10.5)

## 2022-05-15 PROCEDURE — 99233 SBSQ HOSP IP/OBS HIGH 50: CPT | Mod: GC

## 2022-05-15 PROCEDURE — 99233 SBSQ HOSP IP/OBS HIGH 50: CPT

## 2022-05-15 RX ORDER — DEXTROSE MONOHYDRATE, SODIUM CHLORIDE, AND POTASSIUM CHLORIDE 50; .745; 4.5 G/1000ML; G/1000ML; G/1000ML
1000 INJECTION, SOLUTION INTRAVENOUS
Refills: 0 | Status: DISCONTINUED | OUTPATIENT
Start: 2022-05-15 | End: 2022-05-16

## 2022-05-15 RX ORDER — METOCLOPRAMIDE HCL 10 MG
10 TABLET ORAL EVERY 6 HOURS
Refills: 0 | Status: DISCONTINUED | OUTPATIENT
Start: 2022-05-15 | End: 2022-05-18

## 2022-05-15 RX ORDER — IBUPROFEN 200 MG
400 TABLET ORAL ONCE
Refills: 0 | Status: DISCONTINUED | OUTPATIENT
Start: 2022-05-15 | End: 2022-05-15

## 2022-05-15 RX ORDER — CLONAZEPAM 1 MG
1 TABLET ORAL AT BEDTIME
Refills: 0 | Status: DISCONTINUED | OUTPATIENT
Start: 2022-05-15 | End: 2022-05-18

## 2022-05-15 RX ORDER — IBUPROFEN 200 MG
600 TABLET ORAL ONCE
Refills: 0 | Status: DISCONTINUED | OUTPATIENT
Start: 2022-05-15 | End: 2022-05-15

## 2022-05-15 RX ORDER — POTASSIUM CHLORIDE 20 MEQ
40 PACKET (EA) ORAL ONCE
Refills: 0 | Status: DISCONTINUED | OUTPATIENT
Start: 2022-05-15 | End: 2022-05-15

## 2022-05-15 RX ORDER — PANTOPRAZOLE SODIUM 20 MG/1
40 TABLET, DELAYED RELEASE ORAL DAILY
Refills: 0 | Status: DISCONTINUED | OUTPATIENT
Start: 2022-05-15 | End: 2022-05-18

## 2022-05-15 RX ORDER — SODIUM CHLORIDE 9 MG/ML
1000 INJECTION, SOLUTION INTRAVENOUS
Refills: 0 | Status: DISCONTINUED | OUTPATIENT
Start: 2022-05-15 | End: 2022-05-15

## 2022-05-15 RX ADMIN — Medication 10 MILLIGRAM(S): at 09:52

## 2022-05-15 RX ADMIN — Medication 100 MILLIGRAM(S): at 14:39

## 2022-05-15 RX ADMIN — Medication 100 MILLIGRAM(S): at 05:09

## 2022-05-15 RX ADMIN — TAMSULOSIN HYDROCHLORIDE 0.4 MILLIGRAM(S): 0.4 CAPSULE ORAL at 21:36

## 2022-05-15 RX ADMIN — HYDROMORPHONE HYDROCHLORIDE 0.5 MILLIGRAM(S): 2 INJECTION INTRAMUSCULAR; INTRAVENOUS; SUBCUTANEOUS at 11:42

## 2022-05-15 RX ADMIN — DEXTROSE MONOHYDRATE, SODIUM CHLORIDE, AND POTASSIUM CHLORIDE 75 MILLILITER(S): 50; .745; 4.5 INJECTION, SOLUTION INTRAVENOUS at 15:41

## 2022-05-15 RX ADMIN — Medication 1 CAPSULE(S): at 14:52

## 2022-05-15 RX ADMIN — Medication 1 CAPSULE(S): at 14:08

## 2022-05-15 RX ADMIN — Medication 100 MILLIGRAM(S): at 21:35

## 2022-05-15 RX ADMIN — CEFTRIAXONE 100 MILLIGRAM(S): 500 INJECTION, POWDER, FOR SOLUTION INTRAMUSCULAR; INTRAVENOUS at 08:15

## 2022-05-15 RX ADMIN — Medication 200 MILLIGRAM(S): at 05:09

## 2022-05-15 RX ADMIN — HYDROMORPHONE HYDROCHLORIDE 0.5 MILLIGRAM(S): 2 INJECTION INTRAMUSCULAR; INTRAVENOUS; SUBCUTANEOUS at 12:00

## 2022-05-15 RX ADMIN — ENOXAPARIN SODIUM 40 MILLIGRAM(S): 100 INJECTION SUBCUTANEOUS at 17:22

## 2022-05-15 RX ADMIN — PANTOPRAZOLE SODIUM 40 MILLIGRAM(S): 20 TABLET, DELAYED RELEASE ORAL at 17:22

## 2022-05-15 RX ADMIN — Medication 137 MICROGRAM(S): at 05:08

## 2022-05-15 RX ADMIN — Medication 10 MILLIGRAM(S): at 16:46

## 2022-05-15 RX ADMIN — Medication 1 MILLIGRAM(S): at 21:45

## 2022-05-15 RX ADMIN — SODIUM CHLORIDE 100 MILLILITER(S): 9 INJECTION, SOLUTION INTRAVENOUS at 05:09

## 2022-05-15 RX ADMIN — Medication 200 MILLIGRAM(S): at 21:47

## 2022-05-15 RX ADMIN — Medication 650 MILLIGRAM(S): at 05:08

## 2022-05-15 NOTE — PROGRESS NOTE ADULT - PROBLEM SELECTOR PLAN 3
Patient with elevated LFTs today  - RUQ US: Prior cholecystectomy. Mildly prominent common bile duct likely normal for age and postcholecystectomy state however correlate with bilirubin levels. Increased echogenicity of the hepatic parenchyma suggesting hepatic steatosis.  - Bilirubin wnl  - Avoid hepatotoxic meds  - Trend LFTs  - Consider GI consult for worsening LFTs Pt with hx of recurrent kidney stones, follows with Dr. Moreira outpatient  - Recent lithotripsy for L-sided stone on 5/4, scheduled for R-sided lithotripsy on 5/23  - Manage conservatively with IVF, pain/nausea control as above  - Continue home Flomax 0.4 mg at bedtime  - Pt had negative UA, but urinary sx on admission. Urinary sx resolved.  - Strain urine  - F/u with urology next week as outpatient

## 2022-05-15 NOTE — PROGRESS NOTE ADULT - SUBJECTIVE AND OBJECTIVE BOX
[facility]  Division of Infectious Diseases  [phone]    Name: MARI ROBERTO  Age: 66y  Gender: Female  MRN: [id number]    Interval History--  Notes reviewed.     Past Medical History--  Hypothyroidism    Migraines    Anxiety    Kidney Calculi    Obesity    Fibromyalgia    Kidney Calculus- Lithotripsy x 3    S/P Hysterectomy     Delivery    S/P Total Thyroidectomy    History of Appendectomy    Asthma    S/P Laparoscopic Cholecystectomy    History of lumbar spinal fusion        For details regarding the patient's social history, family history, and other miscellaneous elements, please refer the initial infectious diseases consultation and/or the admitting history and physical examination for this admission.    Allergies    codeine (Anaphylaxis; Hives)  fish (Anaphylaxis)  latex (Anaphylaxis)  morphine (Nausea; Vomiting)  penicillin (Hives)    Intolerances        Medications--  Antibiotics:  cefTRIAXone   IVPB      cefTRIAXone   IVPB 1000 milliGRAM(s) IV Intermittent every 24 hours  metroNIDAZOLE  IVPB 500 milliGRAM(s) IV Intermittent every 8 hours    Immunologic:    Other:  acetaminophen     Tablet .. PRN  ALBUTerol    90 MICROgram(s) HFA Inhaler PRN  benzonatate PRN  budesonide 160 MICROgram(s)/formoterol 4.5 MICROgram(s) Inhaler  clonazePAM  Tablet PRN  dextrose 5% + sodium chloride 0.9%.  enoxaparin Injectable  HYDROmorphone  Injectable PRN  HYDROmorphone  Injectable PRN  ibuprofen  Tablet.  levothyroxine  melatonin PRN  metoclopramide Injectable PRN  montelukast  ondansetron Injectable PRN  tamsulosin  trimethobenzamide Injectable PRN      Review of Systems--  A 10-point review of systems was obtained.     Pertinent positives and negatives--  Constitutional: No fevers. No Chills. No Rigors.   Cardiovascular: No chest pain. No palpitations.  Respiratory: No shortness of breath. No cough.  Gastrointestinal: No nausea or vomiting. No diarrhea or constipation.   Psychiatric: Pleasant. Appropriate affect.    Review of systems otherwise negative except as previously noted.    Physical Examination--  Vital Signs: T(F): 98.3 (05-15-22 @ 04:44), Max: 98.3 (22 @ 15:37)  HR: 66 (05-15-22 @ 04:44)  BP: 108/59 (05-15-22 @ 04:44)  RR: 17 (05-15-22 @ 04:44)  SpO2: 96% (05-15-22 @ 04:44)  Wt(kg): --  General: Nontoxic-appearing Female in no acute distress.  HEENT: AT/NC. PERRL. EOMI. Anicteric. Conjunctiva pink and moist. Oropharynx clear. Dentition fair.  Neck: Not rigid. No sense of mass.  Nodes: None palpable.  Lungs: Clear bilaterally without rales, wheezing or rhonchi  Heart: Regular rate and rhythm. No Murmur. No rub. No gallop. No palpable thrill.  Abdomen: Bowel sounds present and normoactive. Soft. Nondistended. Nontender. No sense of mass. No organomegaly.  Back: No spinal tenderness. No costovertebral angle tenderness.   Extremities: No cyanosis or clubbing. No edema.   Skin: Warm. Dry. Good turgor. No rash. No vasculitic stigmata.  Psychiatric: Appropriate affect and mood for situation.         Laboratory Studies--  CBC                        10.4   9.91  )-----------( 221      ( 15 May 2022 08:21 )             31.7       Chemistries  05-15    147<H>  |  116<H>  |  11  ----------------------------<  108<H>  3.4<L>   |  25  |  0.43<L>    Ca    8.7      15 May 2022 08:21    TPro  6.2  /  Alb  2.8<L>  /  TBili  0.3  /  DBili  x   /  AST  42<H>  /  ALT  152<H>  /  AlkPhos  94  05-15      Culture Data    Culture - Urine (collected 13 May 2022 12:33)  Source: Clean Catch Clean Catch (Midstream)  Final Report (14 May 2022 11:19):    <10,000 CFU/mL Normal Urogenital Katarina             [facility]  Division of Infectious Diseases  [phone]    Name: MARI ROBERTO  Age: 66y  Gender: Female  MRN: [id number]    Interval History--  Notes reviewed. Seen earlier today.  Patient still with nausea and vomiting, but denies abdominal pain.  No allergy from the ceftriaxone.  She is not febrile.  She has no chills or rigors.  She is again asking for Klonopin.  She denies any diarrhea.  She has no urinary symptoms.  She denies any back pain at this juncture.    Past Medical History--  Hypothyroidism    Migraines    Anxiety    Kidney Calculi    Obesity    Fibromyalgia    Kidney Calculus- Lithotripsy x 3    S/P Hysterectomy     Delivery    S/P Total Thyroidectomy    History of Appendectomy    Asthma    S/P Laparoscopic Cholecystectomy    History of lumbar spinal fusion        For details regarding the patient's social history, family history, and other miscellaneous elements, please refer the initial infectious diseases consultation and/or the admitting history and physical examination for this admission.    Allergies    codeine (Anaphylaxis; Hives)  fish (Anaphylaxis)  latex (Anaphylaxis)  morphine (Nausea; Vomiting)  penicillin (Hives)    Intolerances        Medications--  Antibiotics:  cefTRIAXone   IVPB      cefTRIAXone   IVPB 1000 milliGRAM(s) IV Intermittent every 24 hours  metroNIDAZOLE  IVPB 500 milliGRAM(s) IV Intermittent every 8 hours    Immunologic:    Other:  acetaminophen     Tablet .. PRN  ALBUTerol    90 MICROgram(s) HFA Inhaler PRN  benzonatate PRN  budesonide 160 MICROgram(s)/formoterol 4.5 MICROgram(s) Inhaler  clonazePAM  Tablet PRN  dextrose 5% + sodium chloride 0.9%.  enoxaparin Injectable  HYDROmorphone  Injectable PRN  HYDROmorphone  Injectable PRN  ibuprofen  Tablet.  levothyroxine  melatonin PRN  metoclopramide Injectable PRN  montelukast  ondansetron Injectable PRN  tamsulosin  trimethobenzamide Injectable PRN      Review of Systems--  A 10-point review of systems was obtained.   Review of systems otherwise negative except as previously noted.    Physical Examination--  Vital Signs: T(F): 98.3 (05-15-22 @ 04:44), Max: 98.3 (22 @ 15:37)  HR: 66 (05-15-22 @ 04:44)  BP: 108/59 (05-15-22 @ 04:44)  RR: 17 (05-15-22 @ 04:44)  SpO2: 96% (05-15-22 @ 04:44)  Wt(kg): --  General: Nontoxic-appearing Female in no acute distress.  HEENT: AT/NC. Anicteric. Conjunctiva pink and moist. Oropharynx clear. Dentition fair.  Neck: Not rigid. No sense of mass.  Nodes: None palpable.  Lungs: Clear bilaterally without rales, wheezing or rhonchi  Heart: Regular rate and rhythm. No Murmur. No rub. No gallop. No palpable thrill.  Abdomen: Bowel sounds present and normoactive. Soft. Nondistended. Nontender. No sense of mass. No organomegaly.  Back: No spinal tenderness. No costovertebral angle tenderness.   Extremities: No cyanosis or clubbing. No edema.   Skin: Warm. Dry. Good turgor. No rash. No vasculitic stigmata.  Psychiatric: Appropriate affect and mood for situation.         Laboratory Studies--  CBC                        10.4   9.91  )-----------( 221      ( 15 May 2022 08:21 )             31.7       Chemistries  05-15    147<H>  |  116<H>  |  11  ----------------------------<  108<H>  3.4<L>   |  25  |  0.43<L>    Ca    8.7      15 May 2022 08:21    LFT Trend  Total Bilirubin  0.3 mg/dL (05-15-22 @ 08:21)  0.4 mg/dL (22 @ 06:22)  0.2 mg/dL (22 @ 08:56)    Direct Bilirubin    Indirect Bilirubin    AKP  94 U/L (05-15-22 @ 08:21)  128 U/L (22 @ 06:22)  74 U/L (22 @ 08:56)    AST  42 U/L (05-15-22 @ 08:21)  161 U/L (22 @ 06:22)  14 U/L (22 @ 08:56)    ALT  152 U/L (05-15-22 @ 08:21)  275 U/L (22 @ 06:22)  28 U/L (22 @ 08:56)      Culture Data  Culture - Urine (collected 13 May 2022 12:33)  Source: Clean Catch Clean Catch (Midstream)  Final Report (14 May 2022 11:19):    <10,000 CFU/mL Normal Urogenital Katarina

## 2022-05-15 NOTE — PROGRESS NOTE ADULT - ASSESSMENT
There is a 66-year-old woman status post recent lithotripsy, with evidence of diverticulitis on CT scan here accompanied by a right ureteral calculus.  However the pain is left sided.  Her abdominal exam at the present time is completely benign, but she did have severe pain previously.  It is hard to tease out whether this was typical renal colic pain or not.  The etiology of her liver function test elevations is not clear.  They will need to be trended.  She is status post cholecystectomy, there is no other obvious abnormality on the CT scan.  Total bilirubin is not elevated to implicate cholangitis, and again the pain is on the left side, not the right side.  The patient is not penicillin allergic.  She tolerates amoxicillin without issues.    5/15: Nausea and vomiting is the main complaint this juncture.  She has not had fevers here, and today she is stating that she did not have fevers and chills prior to admission.  Her abdominal exam remains completely benign.  There is no culture data, but she is afebrile, hemodynamically stable, and white blood cell count normalized rapidly.  Yield on blood culture at this juncture would be very low.  If her nausea and vomiting persist without an explanation, I would obtain  evaluation tomorrow, as perhaps this is related to the obstructing stone.  However her reported pain was relieved on the left side, not on the right side.  Presently she has no pain. LFT improved overall.    Suggestions  Continue ceftriaxone 1 g daily  Continue Flagyl.   Serial abdominal examinations  Trend liver function tests  Follow-up culture data  Antiemetics  Analgesics   Eval reasonable    The [facility] team will assume care for the patient in the am.    Damián Piña MD  Attending Physician  [facility]  Division of Infectious Diseases  [phone]

## 2022-05-15 NOTE — PROGRESS NOTE ADULT - PROBLEM SELECTOR PLAN 4
Continue home albuterol inhaler 1 puff q6h prn, home Symbicort, and home Tessalon 200 mg TID prn for cough Improving. Likely 2/2 IV Cipro x1 in the ED  - RUQ US: Prior cholecystectomy. Mildly prominent common bile duct likely normal for age and postcholecystectomy state however correlate with bilirubin levels. Increased echogenicity of the hepatic parenchyma suggesting hepatic steatosis.  - Bilirubin wnl  - Avoid hepatotoxic meds  - Trend LFTs  - Consider GI consult for worsening LFTs

## 2022-05-15 NOTE — PROGRESS NOTE ADULT - PROBLEM SELECTOR PLAN 6
Continue home Synthroid 137 mcg daily while inpatient Pt on Klonopin 1 mg TID prn at home, but only takes medication once a day at bedtime if needed  - Continue at 1 mg at bedtime prn for now, can increase frequency if pt has uncontrolled anxiety  - Added melatonin 3 mg prn at bedtime for insomnia

## 2022-05-15 NOTE — PROGRESS NOTE ADULT - PROBLEM SELECTOR PLAN 7
Pt with "Left hepatic lobe cystic lesion measures 1.9 cm, previously 1.2 cm on 11/13/2019" and "1 cm nodular density in the left upper quadrant adjacent to the stomach and left adrenal gland, tubular in orientation on sagittal images, favored to represent a focal splenic artery aneurysm, stable in size since 2019  - Pt notified of imaging findings  - Patient informed she will need MRI outpatient on a non-emergent basis for further characterization  - Subcentimeter dense focus of the left kidney on image 26-27 series 2 could reflect area of mineralization   or hemorrhagic/proteinaceous cyst. Follow up with Dr. Moreira Continue home Synthroid 137 mcg daily while inpatient

## 2022-05-15 NOTE — PROGRESS NOTE ADULT - PROBLEM SELECTOR PLAN 2
Pt with hx of recurrent kidney stones, follows with Dr. Moreira outpatient  - Recent lithotripsy for L-sided stone on 5/4, scheduled for R-sided lithotripsy on 5/23  - Will manage conservatively for now with IVF, pain nausea control as above  - Continue home Flomax 0.4 mg at bedtime  - UA negative but pt has some dysuria and hematuria- likely 2/2 stones, but pt is on proper abx regimen to cover for UTI   - Strain urine  - F/u with urology next week as outpatient Pt with hx of recurrent kidney stones, follows with Dr. Moreira outpatient  - Recent lithotripsy for L-sided stone on 5/4, scheduled for R-sided lithotripsy on 5/23  - Manage conservatively with IVF, pain/nausea control as above  - Continue home Flomax 0.4 mg at bedtime  - Pt had negative UA, but urinary sx on admission. Urinary sx resolved.  - Strain urine  - F/u with urology next week as outpatient Likely anemia of inflammation in the setting of infection   - Hgb 12.9 on admission, 10.4 today  - Currently hemodynamically stable, no signs or symptoms of active bleeding  - Monitor CBC

## 2022-05-15 NOTE — PROGRESS NOTE ADULT - PROBLEM SELECTOR PLAN 8
Elective transverse venous sinus stent 11/3/2021. Obtain bHCG, P2Y12, aspirin test immediately on arrival in angio. Confirm that patient took 45 mg brilinta and 81 mg aspirin morning of procedure.       MARI DVT ppx: Lovenox 40 mg daily Pt with "Left hepatic lobe cystic lesion measures 1.9 cm, previously 1.2 cm on 11/13/2019" and "1 cm nodular density in the left upper quadrant adjacent to the stomach and left adrenal gland, tubular in orientation on sagittal images, favored to represent a focal splenic artery aneurysm, stable in size since 2019  - Pt notified of imaging findings  - Patient informed she will need MRI outpatient on a non-emergent basis for further characterization  - Subcentimeter dense focus of the left kidney on image 26-27 series 2 could reflect area of mineralization   or hemorrhagic/proteinaceous cyst. Follow up with Dr. Moreira

## 2022-05-15 NOTE — PROGRESS NOTE ADULT - PROBLEM SELECTOR PLAN 5
Pt on Klonopin 1 mg TID prn at home, but only takes medication once a day at bedtime if needed  - Continue at 1 mg at bedtime prn for now, can increase frequency if pt has uncontrolled anxiety  - Added melatonin 3 mg prn at bedtime for insomnia Continue home albuterol inhaler 1 puff q6h prn, home Symbicort, and home Tessalon 200 mg TID prn for cough

## 2022-05-15 NOTE — PROGRESS NOTE ADULT - ASSESSMENT
65 y/o F with a PMHx of anxiety, hypothyroidism, asthma, kidney stones (s/p lithotripsy on 5/4) admitted for acute sigmoid diverticulitis with concurrent b/l non-obstructing renal stones and R ureteral stone, on IV Cipro/Flagyl. 65 y/o F with a PMHx of anxiety, hypothyroidism, asthma, kidney stones (s/p lithotripsy on 5/4) admitted for acute sigmoid diverticulitis with concurrent b/l non-obstructing renal stones and R ureteral stone, on IV Cipro/Flagyl, failed advanced diet this AM, maintain NPO for now.

## 2022-05-15 NOTE — PROGRESS NOTE ADULT - SUBJECTIVE AND OBJECTIVE BOX
CHARTING IN PROGRESS     Patient is a 66y old  Female who presents with a chief complaint of Diverticulitis (14 May 2022 12:53)      INTERVAL HPI/OVERNIGHT EVENTS:    MEDICATIONS  (STANDING):  budesonide 160 MICROgram(s)/formoterol 4.5 MICROgram(s) Inhaler 2 Puff(s) Inhalation two times a day  cefTRIAXone   IVPB      cefTRIAXone   IVPB 1000 milliGRAM(s) IV Intermittent every 24 hours  dextrose 5% + sodium chloride 0.9%. 1000 milliLiter(s) (100 mL/Hr) IV Continuous <Continuous>  enoxaparin Injectable 40 milliGRAM(s) SubCutaneous every 24 hours  ibuprofen  Tablet. 600 milliGRAM(s) Oral once  levothyroxine 137 MICROGram(s) Oral daily  metroNIDAZOLE  IVPB 500 milliGRAM(s) IV Intermittent every 8 hours  montelukast 10 milliGRAM(s) Oral daily  tamsulosin 0.4 milliGRAM(s) Oral at bedtime    MEDICATIONS  (PRN):  acetaminophen     Tablet .. 650 milliGRAM(s) Oral every 6 hours PRN Temp greater or equal to 38C (100.4F), Mild Pain (1 - 3)  ALBUTerol    90 MICROgram(s) HFA Inhaler 1 Puff(s) Inhalation every 6 hours PRN Shortness of Breath and/or Wheezing  benzonatate 200 milliGRAM(s) Oral three times a day PRN Cough  clonazePAM  Tablet 1 milliGRAM(s) Oral at bedtime PRN Anxiety or Insomnia  HYDROmorphone  Injectable 0.25 milliGRAM(s) IV Push every 4 hours PRN Moderate Pain (4 - 6)  HYDROmorphone  Injectable 0.5 milliGRAM(s) IV Push every 4 hours PRN Severe Pain (7 - 10)  melatonin 3 milliGRAM(s) Oral at bedtime PRN Insomnia  ondansetron Injectable 4 milliGRAM(s) IV Push every 4 hours PRN Nausea and/or Vomiting  trimethobenzamide Injectable 200 milliGRAM(s) IntraMuscular three times a day PRN Nausea and/or Vomiting      Allergies    codeine (Anaphylaxis; Hives)  fish (Anaphylaxis)  latex (Anaphylaxis)  morphine (Nausea; Vomiting)  penicillin (Hives)    Intolerances        REVIEW OF SYSTEMS:  CONSTITUTIONAL: No fever or chills  HEENT:  No headache, no sore throat  RESPIRATORY: No cough, wheezing, or shortness of breath  CARDIOVASCULAR: No chest pain, palpitations  GASTROINTESTINAL: No abd pain, nausea, vomiting, or diarrhea  GENITOURINARY: No dysuria, frequency, or hematuria  NEUROLOGICAL: no focal weakness or dizziness  MUSCULOSKELETAL: no myalgias     Vital Signs Last 24 Hrs  T(C): 36.8 (15 May 2022 04:44), Max: 36.8 (14 May 2022 15:37)  T(F): 98.3 (15 May 2022 04:44), Max: 98.3 (14 May 2022 15:37)  HR: 66 (15 May 2022 04:44) (66 - 92)  BP: 108/59 (15 May 2022 04:44) (108/59 - 138/61)  BP(mean): --  RR: 17 (15 May 2022 04:44) (16 - 17)  SpO2: 96% (15 May 2022 04:44) (96% - 100%)    PHYSICAL EXAM:  GENERAL: NAD  HEENT:  anicteric, moist mucous membranes  CHEST/LUNG:  CTA b/l, no rales, wheezes, or rhonchi  HEART:  RRR, S1, S2  ABDOMEN:  BS+, soft, nontender, nondistended  EXTREMITIES: no edema, cyanosis, or calf tenderness  NERVOUS SYSTEM: answers questions and follows commands appropriately    LABS:                        10.4   9.91  )-----------( 221      ( 15 May 2022 08:21 )             31.7     CBC Full  -  ( 15 May 2022 08:21 )  WBC Count : 9.91 K/uL  Hemoglobin : 10.4 g/dL  Hematocrit : 31.7 %  Platelet Count - Automated : 221 K/uL  Mean Cell Volume : 92.2 fl  Mean Cell Hemoglobin : 30.2 pg  Mean Cell Hemoglobin Concentration : 32.8 gm/dL  Auto Neutrophil # : 7.23 K/uL  Auto Lymphocyte # : 1.90 K/uL  Auto Monocyte # : 0.69 K/uL  Auto Eosinophil # : 0.03 K/uL  Auto Basophil # : 0.02 K/uL  Auto Neutrophil % : 72.9 %  Auto Lymphocyte % : 19.2 %  Auto Monocyte % : 7.0 %  Auto Eosinophil % : 0.3 %  Auto Basophil % : 0.2 %    15 May 2022 08:21    147    |  116    |  11     ----------------------------<  108    3.4     |  25     |  0.43     Ca    8.7        15 May 2022 08:21    TPro  6.2    /  Alb  2.8    /  TBili  0.3    /  DBili  x      /  AST  42     /  ALT  152    /  AlkPhos  94     15 May 2022 08:21      Urinalysis Basic - ( 13 May 2022 09:50 )    Color: Yellow / Appearance: Clear / S.020 / pH: x  Gluc: x / Ketone: Negative  / Bili: Negative / Urobili: Negative   Blood: x / Protein: 15 / Nitrite: Negative   Leuk Esterase: Negative / RBC: 6-10 /HPF / WBC 0-2   Sq Epi: x / Non Sq Epi: Occasional / Bacteria: Negative      CAPILLARY BLOOD GLUCOSE            Culture - Urine (collected 22 @ 12:33)  Source: Clean Catch Clean Catch (Midstream)  Final Report (22 @ 11:19):    <10,000 CFU/mL Normal Urogenital Katarina        RADIOLOGY & ADDITIONAL TESTS:    Personally reviewed.     Consultant(s) Notes Reviewed:  [x] YES  [ ] NO     CHARTING IN PROGRESS     Patient is a 66y old  Female who presents with a chief complaint of Diverticulitis (14 May 2022 12:53)      INTERVAL HPI/OVERNIGHT EVENTS: Patient was nauseous overnight requiring Reglan x1. Patient seen and examined at bedside. Admits her last episode of emesis was at 10:30pm and she feels "much better" this morning. She reports no nausea/vomiting this morning, but admits to a mild headache, which she has frequently at home. She states she had soft stool this AM, but denies diarrhea. Denies fever/chills, dizziness, chest pain, shortness of breath, diarrhea/constipation, urinary changes, myalgias.     MEDICATIONS  (STANDING):  budesonide 160 MICROgram(s)/formoterol 4.5 MICROgram(s) Inhaler 2 Puff(s) Inhalation two times a day  cefTRIAXone   IVPB      cefTRIAXone   IVPB 1000 milliGRAM(s) IV Intermittent every 24 hours  dextrose 5% + sodium chloride 0.9%. 1000 milliLiter(s) (100 mL/Hr) IV Continuous <Continuous>  enoxaparin Injectable 40 milliGRAM(s) SubCutaneous every 24 hours  ibuprofen  Tablet. 600 milliGRAM(s) Oral once  levothyroxine 137 MICROGram(s) Oral daily  metroNIDAZOLE  IVPB 500 milliGRAM(s) IV Intermittent every 8 hours  montelukast 10 milliGRAM(s) Oral daily  tamsulosin 0.4 milliGRAM(s) Oral at bedtime    MEDICATIONS  (PRN):  acetaminophen     Tablet .. 650 milliGRAM(s) Oral every 6 hours PRN Temp greater or equal to 38C (100.4F), Mild Pain (1 - 3)  ALBUTerol    90 MICROgram(s) HFA Inhaler 1 Puff(s) Inhalation every 6 hours PRN Shortness of Breath and/or Wheezing  benzonatate 200 milliGRAM(s) Oral three times a day PRN Cough  clonazePAM  Tablet 1 milliGRAM(s) Oral at bedtime PRN Anxiety or Insomnia  HYDROmorphone  Injectable 0.25 milliGRAM(s) IV Push every 4 hours PRN Moderate Pain (4 - 6)  HYDROmorphone  Injectable 0.5 milliGRAM(s) IV Push every 4 hours PRN Severe Pain (7 - 10)  melatonin 3 milliGRAM(s) Oral at bedtime PRN Insomnia  ondansetron Injectable 4 milliGRAM(s) IV Push every 4 hours PRN Nausea and/or Vomiting  trimethobenzamide Injectable 200 milliGRAM(s) IntraMuscular three times a day PRN Nausea and/or Vomiting      Allergies    codeine (Anaphylaxis; Hives)  fish (Anaphylaxis)  latex (Anaphylaxis)  morphine (Nausea; Vomiting)  penicillin (Hives)    Intolerances        REVIEW OF SYSTEMS:  CONSTITUTIONAL: No fever or chills  HEENT: Admits mild headache  RESPIRATORY: No shortness of breath  CARDIOVASCULAR: No chest pain  GASTROINTESTINAL: abd pain, nausea, vomiting improved, no diarrhea  GENITOURINARY: No dysuria, frequency, or hematuria  NEUROLOGICAL: no dizziness  MUSCULOSKELETAL: no myalgias     Vital Signs Last 24 Hrs  T(C): 36.8 (15 May 2022 04:44), Max: 36.8 (14 May 2022 15:37)  T(F): 98.3 (15 May 2022 04:44), Max: 98.3 (14 May 2022 15:37)  HR: 66 (15 May 2022 04:44) (66 - 92)  BP: 108/59 (15 May 2022 04:44) (108/59 - 138/61)  BP(mean): --  RR: 17 (15 May 2022 04:44) (16 - 17)  SpO2: 96% (15 May 2022 04:44) (96% - 100%)    PHYSICAL EXAM:  GENERAL: NAD  HEENT:  anicteric, moist mucous membranes  CHEST/LUNG:  CTA b/l, no rales, wheezes, or rhonchi  HEART:  RRR, S1, S2  ABDOMEN:  soft, tenderness to palpation in b/l lower abdominal quad  EXTREMITIES: no edema, cyanosis, or calf tenderness  NERVOUS SYSTEM: answers questions and follows commands appropriately    LABS:                        10.4   9.91  )-----------( 221      ( 15 May 2022 08:21 )             31.7     CBC Full  -  ( 15 May 2022 08:21 )  WBC Count : 9.91 K/uL  Hemoglobin : 10.4 g/dL  Hematocrit : 31.7 %  Platelet Count - Automated : 221 K/uL  Mean Cell Volume : 92.2 fl  Mean Cell Hemoglobin : 30.2 pg  Mean Cell Hemoglobin Concentration : 32.8 gm/dL  Auto Neutrophil # : 7.23 K/uL  Auto Lymphocyte # : 1.90 K/uL  Auto Monocyte # : 0.69 K/uL  Auto Eosinophil # : 0.03 K/uL  Auto Basophil # : 0.02 K/uL  Auto Neutrophil % : 72.9 %  Auto Lymphocyte % : 19.2 %  Auto Monocyte % : 7.0 %  Auto Eosinophil % : 0.3 %  Auto Basophil % : 0.2 %    15 May 2022 08:21    147    |  116    |  11     ----------------------------<  108    3.4     |  25     |  0.43     Ca    8.7        15 May 2022 08:21    TPro  6.2    /  Alb  2.8    /  TBili  0.3    /  DBili  x      /  AST  42     /  ALT  152    /  AlkPhos  94     15 May 2022 08:21      Urinalysis Basic - ( 13 May 2022 09:50 )    Color: Yellow / Appearance: Clear / S.020 / pH: x  Gluc: x / Ketone: Negative  / Bili: Negative / Urobili: Negative   Blood: x / Protein: 15 / Nitrite: Negative   Leuk Esterase: Negative / RBC: 6-10 /HPF / WBC 0-2   Sq Epi: x / Non Sq Epi: Occasional / Bacteria: Negative      CAPILLARY BLOOD GLUCOSE            Culture - Urine (collected 22 @ 12:33)  Source: Clean Catch Clean Catch (Midstream)  Final Report (22 @ 11:19):    <10,000 CFU/mL Normal Urogenital Katarina        RADIOLOGY & ADDITIONAL TESTS:    Personally reviewed.     Consultant(s) Notes Reviewed:  [x] YES  [ ] NO     Patient is a 66y old  Female who presents with a chief complaint of Diverticulitis (14 May 2022 12:53)      INTERVAL HPI/OVERNIGHT EVENTS: Patient was nauseous overnight requiring Reglan x1. Patient seen and examined at bedside. Admits her last episode of emesis was at 10:30pm and she feels "much better" this morning. She admits to a mild headache, which she has frequently at home. She states she had soft stool this AM, but denies diarrhea. Patient advanced to clear liquid diet this morning with subsequent vomiting. Will keep NPO for now and attempt to advance diet tomorrow. Denies fever/chills, dizziness, chest pain, shortness of breath, diarrhea/constipation, urinary changes, myalgias.    MEDICATIONS  (STANDING):  budesonide 160 MICROgram(s)/formoterol 4.5 MICROgram(s) Inhaler 2 Puff(s) Inhalation two times a day  cefTRIAXone   IVPB      cefTRIAXone   IVPB 1000 milliGRAM(s) IV Intermittent every 24 hours  dextrose 5% + sodium chloride 0.9%. 1000 milliLiter(s) (100 mL/Hr) IV Continuous <Continuous>  enoxaparin Injectable 40 milliGRAM(s) SubCutaneous every 24 hours  ibuprofen  Tablet. 600 milliGRAM(s) Oral once  levothyroxine 137 MICROGram(s) Oral daily  metroNIDAZOLE  IVPB 500 milliGRAM(s) IV Intermittent every 8 hours  montelukast 10 milliGRAM(s) Oral daily  tamsulosin 0.4 milliGRAM(s) Oral at bedtime    MEDICATIONS  (PRN):  acetaminophen     Tablet .. 650 milliGRAM(s) Oral every 6 hours PRN Temp greater or equal to 38C (100.4F), Mild Pain (1 - 3)  ALBUTerol    90 MICROgram(s) HFA Inhaler 1 Puff(s) Inhalation every 6 hours PRN Shortness of Breath and/or Wheezing  benzonatate 200 milliGRAM(s) Oral three times a day PRN Cough  clonazePAM  Tablet 1 milliGRAM(s) Oral at bedtime PRN Anxiety or Insomnia  HYDROmorphone  Injectable 0.25 milliGRAM(s) IV Push every 4 hours PRN Moderate Pain (4 - 6)  HYDROmorphone  Injectable 0.5 milliGRAM(s) IV Push every 4 hours PRN Severe Pain (7 - 10)  melatonin 3 milliGRAM(s) Oral at bedtime PRN Insomnia  ondansetron Injectable 4 milliGRAM(s) IV Push every 4 hours PRN Nausea and/or Vomiting  trimethobenzamide Injectable 200 milliGRAM(s) IntraMuscular three times a day PRN Nausea and/or Vomiting      Allergies    codeine (Anaphylaxis; Hives)  fish (Anaphylaxis)  latex (Anaphylaxis)  morphine (Nausea; Vomiting)  penicillin (Hives)    Intolerances        REVIEW OF SYSTEMS:  CONSTITUTIONAL: No fever or chills  HEENT: Admits mild headache  RESPIRATORY: No shortness of breath  CARDIOVASCULAR: No chest pain  GASTROINTESTINAL: abd pain, nausea, vomiting improved, no diarrhea  GENITOURINARY: No dysuria, frequency, or hematuria  NEUROLOGICAL: no dizziness  MUSCULOSKELETAL: no myalgias     Vital Signs Last 24 Hrs  T(C): 36.8 (15 May 2022 04:44), Max: 36.8 (14 May 2022 15:37)  T(F): 98.3 (15 May 2022 04:44), Max: 98.3 (14 May 2022 15:37)  HR: 66 (15 May 2022 04:44) (66 - 92)  BP: 108/59 (15 May 2022 04:44) (108/59 - 138/61)  BP(mean): --  RR: 17 (15 May 2022 04:44) (16 - 17)  SpO2: 96% (15 May 2022 04:44) (96% - 100%)    PHYSICAL EXAM:  GENERAL: NAD  HEENT:  anicteric, moist mucous membranes  CHEST/LUNG:  CTA b/l, no rales, wheezes, or rhonchi  HEART:  RRR, S1, S2  ABDOMEN:  soft, tenderness to palpation in b/l lower abdominal quad  EXTREMITIES: no edema, cyanosis, or calf tenderness  NERVOUS SYSTEM: answers questions and follows commands appropriately    LABS:                        10.4   9.91  )-----------( 221      ( 15 May 2022 08:21 )             31.7     CBC Full  -  ( 15 May 2022 08:21 )  WBC Count : 9.91 K/uL  Hemoglobin : 10.4 g/dL  Hematocrit : 31.7 %  Platelet Count - Automated : 221 K/uL  Mean Cell Volume : 92.2 fl  Mean Cell Hemoglobin : 30.2 pg  Mean Cell Hemoglobin Concentration : 32.8 gm/dL  Auto Neutrophil # : 7.23 K/uL  Auto Lymphocyte # : 1.90 K/uL  Auto Monocyte # : 0.69 K/uL  Auto Eosinophil # : 0.03 K/uL  Auto Basophil # : 0.02 K/uL  Auto Neutrophil % : 72.9 %  Auto Lymphocyte % : 19.2 %  Auto Monocyte % : 7.0 %  Auto Eosinophil % : 0.3 %  Auto Basophil % : 0.2 %    15 May 2022 08:21    147    |  116    |  11     ----------------------------<  108    3.4     |  25     |  0.43     Ca    8.7        15 May 2022 08:21    TPro  6.2    /  Alb  2.8    /  TBili  0.3    /  DBili  x      /  AST  42     /  ALT  152    /  AlkPhos  94     15 May 2022 08:21      Urinalysis Basic - ( 13 May 2022 09:50 )    Color: Yellow / Appearance: Clear / S.020 / pH: x  Gluc: x / Ketone: Negative  / Bili: Negative / Urobili: Negative   Blood: x / Protein: 15 / Nitrite: Negative   Leuk Esterase: Negative / RBC: 6-10 /HPF / WBC 0-2   Sq Epi: x / Non Sq Epi: Occasional / Bacteria: Negative      CAPILLARY BLOOD GLUCOSE            Culture - Urine (collected 22 @ 12:33)  Source: Clean Catch Clean Catch (Midstream)  Final Report (22 @ 11:19):    <10,000 CFU/mL Normal Urogenital Katarina        RADIOLOGY & ADDITIONAL TESTS:    Personally reviewed.     Consultant(s) Notes Reviewed:  [x] YES  [ ] NO

## 2022-05-15 NOTE — PROGRESS NOTE ADULT - PROBLEM SELECTOR PLAN 1
Pt presenting with 1 day of acute LQ abd pain, N/V, inability to tolerate PO  - CT scan showing acute uncomplicated sigmoid diverticulitis, mild leukocytosis but pt does not meet sepsis criteria  - Given IV Cipro/Flagyl in ED, continue IV for now while patient is NPO, can transition to PO once pt is on a diet  - NPO except meds for now; started IV NS @ 100 cc/hr x 20 hours, re-evaluate need for fluids pending clinical course and tolerance for PO diet  - Pain control with Tylenol 650 mg for mild, Dilaudid 0.25 mg q4h for moderate, and 0.5 mg q4h for severe   - Zofran prn for nausea  - Monitor abd exams, WBC count, fever curve - Pt presented with 1 day of acute LQ abd pain, N/V, inability to tolerate PO, symptoms now improving  - CT scan showing acute uncomplicated sigmoid diverticulitis, mild leukocytosis but pt does not meet sepsis criteria  - Given IV Cipro/Flagyl in ED, continue IV for now while patient is NPO, can transition to PO once pt is on a diet  - NPO except meds for now; started IV NS @ 100 cc/hr x 20 hours, re-evaluate need for fluids pending clinical course and tolerance for PO diet  - Pain control with Tylenol 650 mg for mild, Dilaudid 0.25 mg q4h for moderate, and 0.5 mg q4h for severe   - Zofran prn for nausea  - Monitor abd exams, WBC count, fever curve - Pt presented with 1 day of acute LQ abd pain, N/V, inability to tolerate PO  - CT scan showing acute uncomplicated sigmoid diverticulitis, mild leukocytosis but pt does not meet sepsis criteria  - Given IV Cipro/Flagyl in ED  - Continue IV CTX and IV Flagyl  - NPO except meds for now as she failed trial of clear liquid diet this AM.   - IV D5 + 1/2 NS @ 75 cc/hr, re-evaluate need for fluids pending clinical course and tolerance for PO diet  - Pain control with Tylenol 650 mg for mild, Dilaudid 0.25 mg q4h for moderate, and 0.5 mg q4h for severe   - Zofran prn for nausea  - Monitor abd exams, WBC count, fever curve

## 2022-05-16 ENCOUNTER — TRANSCRIPTION ENCOUNTER (OUTPATIENT)
Age: 67
End: 2022-05-16

## 2022-05-16 LAB
ALBUMIN SERPL ELPH-MCNC: 2.9 G/DL — LOW (ref 3.3–5)
ALP SERPL-CCNC: 81 U/L — SIGNIFICANT CHANGE UP (ref 40–120)
ALT FLD-CCNC: 121 U/L — HIGH (ref 12–78)
ANION GAP SERPL CALC-SCNC: 5 MMOL/L — SIGNIFICANT CHANGE UP (ref 5–17)
AST SERPL-CCNC: 29 U/L — SIGNIFICANT CHANGE UP (ref 15–37)
BASOPHILS # BLD AUTO: 0.03 K/UL — SIGNIFICANT CHANGE UP (ref 0–0.2)
BASOPHILS NFR BLD AUTO: 0.3 % — SIGNIFICANT CHANGE UP (ref 0–2)
BILIRUB SERPL-MCNC: 0.2 MG/DL — SIGNIFICANT CHANGE UP (ref 0.2–1.2)
BUN SERPL-MCNC: 10 MG/DL — SIGNIFICANT CHANGE UP (ref 7–23)
CALCIUM SERPL-MCNC: 8.6 MG/DL — SIGNIFICANT CHANGE UP (ref 8.5–10.1)
CHLORIDE SERPL-SCNC: 113 MMOL/L — HIGH (ref 96–108)
CO2 SERPL-SCNC: 27 MMOL/L — SIGNIFICANT CHANGE UP (ref 22–31)
CREAT SERPL-MCNC: 0.49 MG/DL — LOW (ref 0.5–1.3)
EGFR: 105 ML/MIN/1.73M2 — SIGNIFICANT CHANGE UP
EOSINOPHIL # BLD AUTO: 0.05 K/UL — SIGNIFICANT CHANGE UP (ref 0–0.5)
EOSINOPHIL NFR BLD AUTO: 0.6 % — SIGNIFICANT CHANGE UP (ref 0–6)
GLUCOSE SERPL-MCNC: 95 MG/DL — SIGNIFICANT CHANGE UP (ref 70–99)
HCT VFR BLD CALC: 31.5 % — LOW (ref 34.5–45)
HGB BLD-MCNC: 10.8 G/DL — LOW (ref 11.5–15.5)
IMM GRANULOCYTES NFR BLD AUTO: 0.3 % — SIGNIFICANT CHANGE UP (ref 0–1.5)
LYMPHOCYTES # BLD AUTO: 2.33 K/UL — SIGNIFICANT CHANGE UP (ref 1–3.3)
LYMPHOCYTES # BLD AUTO: 26.2 % — SIGNIFICANT CHANGE UP (ref 13–44)
MCHC RBC-ENTMCNC: 30.8 PG — SIGNIFICANT CHANGE UP (ref 27–34)
MCHC RBC-ENTMCNC: 34.3 GM/DL — SIGNIFICANT CHANGE UP (ref 32–36)
MCV RBC AUTO: 89.7 FL — SIGNIFICANT CHANGE UP (ref 80–100)
MONOCYTES # BLD AUTO: 0.66 K/UL — SIGNIFICANT CHANGE UP (ref 0–0.9)
MONOCYTES NFR BLD AUTO: 7.4 % — SIGNIFICANT CHANGE UP (ref 2–14)
NEUTROPHILS # BLD AUTO: 5.78 K/UL — SIGNIFICANT CHANGE UP (ref 1.8–7.4)
NEUTROPHILS NFR BLD AUTO: 65.2 % — SIGNIFICANT CHANGE UP (ref 43–77)
NRBC # BLD: 0 /100 WBCS — SIGNIFICANT CHANGE UP (ref 0–0)
PLATELET # BLD AUTO: 198 K/UL — SIGNIFICANT CHANGE UP (ref 150–400)
POTASSIUM SERPL-MCNC: 3.2 MMOL/L — LOW (ref 3.5–5.3)
POTASSIUM SERPL-SCNC: 3.2 MMOL/L — LOW (ref 3.5–5.3)
PROT SERPL-MCNC: 6.3 G/DL — SIGNIFICANT CHANGE UP (ref 6–8.3)
RBC # BLD: 3.51 M/UL — LOW (ref 3.8–5.2)
RBC # FLD: 14 % — SIGNIFICANT CHANGE UP (ref 10.3–14.5)
SODIUM SERPL-SCNC: 145 MMOL/L — SIGNIFICANT CHANGE UP (ref 135–145)
WBC # BLD: 8.88 K/UL — SIGNIFICANT CHANGE UP (ref 3.8–10.5)
WBC # FLD AUTO: 8.88 K/UL — SIGNIFICANT CHANGE UP (ref 3.8–10.5)

## 2022-05-16 PROCEDURE — 99233 SBSQ HOSP IP/OBS HIGH 50: CPT

## 2022-05-16 PROCEDURE — 99232 SBSQ HOSP IP/OBS MODERATE 35: CPT | Mod: GC

## 2022-05-16 RX ORDER — DIPHENHYDRAMINE HCL 50 MG
25 CAPSULE ORAL ONCE
Refills: 0 | Status: COMPLETED | OUTPATIENT
Start: 2022-05-16 | End: 2022-05-16

## 2022-05-16 RX ORDER — DEXTROSE MONOHYDRATE, SODIUM CHLORIDE, AND POTASSIUM CHLORIDE 50; .745; 4.5 G/1000ML; G/1000ML; G/1000ML
1000 INJECTION, SOLUTION INTRAVENOUS
Refills: 0 | Status: DISCONTINUED | OUTPATIENT
Start: 2022-05-16 | End: 2022-05-16

## 2022-05-16 RX ORDER — POTASSIUM CHLORIDE 20 MEQ
40 PACKET (EA) ORAL ONCE
Refills: 0 | Status: COMPLETED | OUTPATIENT
Start: 2022-05-16 | End: 2022-05-16

## 2022-05-16 RX ADMIN — PANTOPRAZOLE SODIUM 40 MILLIGRAM(S): 20 TABLET, DELAYED RELEASE ORAL at 11:39

## 2022-05-16 RX ADMIN — Medication 40 MILLIEQUIVALENT(S): at 11:40

## 2022-05-16 RX ADMIN — MONTELUKAST 10 MILLIGRAM(S): 4 TABLET, CHEWABLE ORAL at 21:33

## 2022-05-16 RX ADMIN — Medication 100 MILLIGRAM(S): at 06:00

## 2022-05-16 RX ADMIN — Medication 200 MILLIGRAM(S): at 21:33

## 2022-05-16 RX ADMIN — Medication 137 MICROGRAM(S): at 05:57

## 2022-05-16 RX ADMIN — ONDANSETRON 4 MILLIGRAM(S): 8 TABLET, FILM COATED ORAL at 09:52

## 2022-05-16 RX ADMIN — ENOXAPARIN SODIUM 40 MILLIGRAM(S): 100 INJECTION SUBCUTANEOUS at 17:54

## 2022-05-16 RX ADMIN — Medication 100 MILLIGRAM(S): at 14:48

## 2022-05-16 RX ADMIN — Medication 100 MILLIGRAM(S): at 21:32

## 2022-05-16 RX ADMIN — Medication 10 MILLIGRAM(S): at 19:33

## 2022-05-16 RX ADMIN — CEFTRIAXONE 100 MILLIGRAM(S): 500 INJECTION, POWDER, FOR SOLUTION INTRAMUSCULAR; INTRAVENOUS at 08:51

## 2022-05-16 RX ADMIN — Medication 10 MILLIGRAM(S): at 05:59

## 2022-05-16 RX ADMIN — Medication 25 MILLIGRAM(S): at 20:43

## 2022-05-16 RX ADMIN — Medication 1 MILLIGRAM(S): at 21:33

## 2022-05-16 RX ADMIN — HYDROMORPHONE HYDROCHLORIDE 0.5 MILLIGRAM(S): 2 INJECTION INTRAMUSCULAR; INTRAVENOUS; SUBCUTANEOUS at 05:59

## 2022-05-16 NOTE — DISCHARGE NOTE PROVIDER - CARE PROVIDERS DIRECT ADDRESSES
[email or web address]

## 2022-05-16 NOTE — DISCHARGE NOTE PROVIDER - NSDCMRMEDTOKEN_GEN_ALL_CORE_FT
Albuterol (Eqv-ProAir HFA) 90 mcg/inh inhalation aerosol: 1 puff(s) inhaled every 6 hours, As Needed  clonazePAM 1 mg oral tablet: 1 tab(s) orally 3 times a day, As Needed  Flomax 0.4 mg oral capsule: 1 cap(s) orally once a day   ondansetron 4 mg oral tablet, disintegratin tab(s) orally every 6 hours, As Needed   Singulair 10 mg oral tablet: 1 tab(s) orally once a day  Symbicort 160 mcg-4.5 mcg/inh inhalation aerosol: 2 puff(s) inhaled 2 times a day  Synthroid 137 mcg (0.137 mg) oral tablet: 1 tab(s) orally once a day  Tessalon 200 mg oral capsule: 1 cap(s) orally 3 times a day, As Needed   Albuterol (Eqv-ProAir HFA) 90 mcg/inh inhalation aerosol: 1 puff(s) inhaled every 6 hours, As Needed  ciprofloxacin 500 mg oral tablet: 1 tab(s) orally every 12 hours  clonazePAM 1 mg oral tablet: 1 tab(s) orally 3 times a day, As Needed  Flomax 0.4 mg oral capsule: 1 cap(s) orally once a day   metroNIDAZOLE 500 mg oral tablet: 1 tab(s) orally every 8 hours  ondansetron 4 mg oral tablet, disintegratin tab(s) orally every 6 hours, As Needed   potassium chloride 20 mEq oral tablet, extended release: 1 tab(s) orally once a day (in the morning)   Singulair 10 mg oral tablet: 1 tab(s) orally once a day  Symbicort 160 mcg-4.5 mcg/inh inhalation aerosol: 2 puff(s) inhaled 2 times a day  Synthroid 137 mcg (0.137 mg) oral tablet: 1 tab(s) orally once a day  Tessalon 200 mg oral capsule: 1 cap(s) orally 3 times a day, As Needed

## 2022-05-16 NOTE — DIETITIAN INITIAL EVALUATION ADULT - LITERATURE/VIDEOS GIVEN
low fiber diet for 2 weeks adv slowly as tolerated to high fiber while ensuring adequate fluid intake.

## 2022-05-16 NOTE — DIETITIAN INITIAL EVALUATION ADULT - PERTINENT LABORATORY DATA
05-16    145  |  113<H>  |  10  ----------------------------<  95  3.2<L>   |  27  |  0.49<L>    Ca    8.6      16 May 2022 08:01    TPro  6.3  /  Alb  2.9<L>  /  TBili  0.2  /  DBili  x   /  AST  29  /  ALT  121<H>  /  AlkPhos  81  05-16  A1C with Estimated Average Glucose Result: 5.3 % (05-14-22 @ 10:17)

## 2022-05-16 NOTE — PROGRESS NOTE ADULT - PROBLEM SELECTOR PLAN 3
Pt with hx of recurrent kidney stones, follows with Dr. Moreira outpatient  - Recent lithotripsy for L-sided stone on 5/4, scheduled for R-sided lithotripsy on 5/23  - Manage conservatively with IVF, pain/nausea control as above  - Continue home Flomax 0.4 mg at bedtime  - Pt had negative UA, but urinary sx on admission. Urinary sx resolved.  - Strain urine  - F/u with urology next week as outpatient Pt with hx of recurrent kidney stones, follows with Dr. Moreira outpatient  - Recent lithotripsy for L-sided stone on 5/4, scheduled for R-sided lithotripsy on 5/23  - Manage conservatively with IVF, pain/nausea control as above  - Continue home Flomax 0.4 mg at bedtime  - Pt had negative UA, but urinary sx on admission. Urinary sx resolved.  - Strain urine  - Urology (Dr. Moreira) consulted, f/u recs

## 2022-05-16 NOTE — DIETITIAN INITIAL EVALUATION ADULT - PERTINENT MEDS FT
MEDICATIONS  (STANDING):  budesonide 160 MICROgram(s)/formoterol 4.5 MICROgram(s) Inhaler 2 Puff(s) Inhalation two times a day  cefTRIAXone   IVPB      cefTRIAXone   IVPB 1000 milliGRAM(s) IV Intermittent every 24 hours  clonazePAM  Tablet 1 milliGRAM(s) Oral at bedtime  dextrose 5% + sodium chloride 0.45% with potassium chloride 20 mEq/L 1000 milliLiter(s) (75 mL/Hr) IV Continuous <Continuous>  enoxaparin Injectable 40 milliGRAM(s) SubCutaneous every 24 hours  levothyroxine 137 MICROGram(s) Oral daily  metroNIDAZOLE  IVPB 500 milliGRAM(s) IV Intermittent every 8 hours  montelukast 10 milliGRAM(s) Oral daily  pantoprazole  Injectable 40 milliGRAM(s) IV Push daily  potassium chloride    Tablet ER 40 milliEquivalent(s) Oral once  tamsulosin 0.4 milliGRAM(s) Oral at bedtime    MEDICATIONS  (PRN):  acetaminophen     Tablet .. 650 milliGRAM(s) Oral every 6 hours PRN Temp greater or equal to 38C (100.4F), Mild Pain (1 - 3)  acetaminophen 300 mG/butalbital 50 mG/ caffeine 40 mG 1 Capsule(s) Oral every 8 hours PRN migraine  ALBUTerol    90 MICROgram(s) HFA Inhaler 1 Puff(s) Inhalation every 6 hours PRN Shortness of Breath and/or Wheezing  benzonatate 200 milliGRAM(s) Oral three times a day PRN Cough  HYDROmorphone  Injectable 0.25 milliGRAM(s) IV Push every 4 hours PRN Moderate Pain (4 - 6)  HYDROmorphone  Injectable 0.5 milliGRAM(s) IV Push every 4 hours PRN Severe Pain (7 - 10)  melatonin 3 milliGRAM(s) Oral at bedtime PRN Insomnia  metoclopramide Injectable 10 milliGRAM(s) IV Push every 6 hours PRN nausea/vomiting  ondansetron Injectable 4 milliGRAM(s) IV Push every 4 hours PRN Nausea and/or Vomiting  trimethobenzamide Injectable 200 milliGRAM(s) IntraMuscular three times a day PRN Nausea and/or Vomiting

## 2022-05-16 NOTE — DISCHARGE NOTE PROVIDER - CARE PROVIDER_API CALL
Prieto Monteiro, Phys,    Phone: [phone]  Fax: ()-  Follow Up Time:     Malachi Moreira  UROLOGY  [address], [address]  [place] [address]  Phone: [phone]  Fax: [phone]  Follow Up Time: 1 week   Prieto Monteiro, Phys,    Phone: ()-  Fax: ()-  Follow Up Time:     Malachi Moreira  UROLOGY  [address], [address]  [place] [address]  Phone: [phone]  Fax: [phone]  Follow Up Time: 1 week    Bg Becerril (DO)  Internal Medicine  [address] [place] [address]  Phone: [phone]  Fax: [phone]  Follow Up Time: 1-3 days

## 2022-05-16 NOTE — DISCHARGE NOTE PROVIDER - HOSPITAL COURSE
ADMISSION H+P:    HPI:  67 y/o F with a PMHx of anxiety, hypothyroidism, asthma, kidney stones (s/p lithotripsy on 5/4/22), who presents with 1 day of lower quadrant abd pain and associated flank pain. Pt has a hx of recurrent renal stones, and has had several lithotripsies with Dr. Moreira, the most recent being on the left side on 5/4/22, with a right lithotripsy scheduled for 5/23. Pain started last night, and acutely worsened this morning at 4 AM, associated with nausea and over 10 episodes of NBNB vomiting, and inability to tolerate PO. Pt has chills, but denies any fevers, chest pain, palpitations, SOB, URI sx, change in bowel habits, hematochezia, or LE pain/swelling. Pt does have some dysuria, which is typical for when she gets kidney stones. Pt was recently on nitrofurantoin prior to lithotripsy. Pain is currently 8/10. Her last colonoscopy was 7 years ago, and she has never had diverticulosis/diverticulitis before.    ED course:  VS T 97F, HR 84, /60, RR 18, SpO2 98% on RA  labs significant for mild leukocytosis, UA negative  CT A/P showing acute uncomplicated sigmoid diverticulitis, 4 mm mid-distal R ureteral calculus, b/l 3 mm non-obstructing renal calculi, no hydronephrosis  Incidental finding of 1 cm focal nodularity in RUQ c/w focal splenic artery aneurysm, stable since 2019  Given IV Tylenol, IV Dilaudid 0.5 mg x 2, Zofran 4 mg x 1, Reglan 10 mg x 1 in ED, 1 L NS bolus, IV Cipro and Flagyl x 1 (13 May 2022 14:51)      ---  HOSPITAL COURSE:   Patient was admitted to the hospital for acute diverticulitis. CT showed sigmoid diverticulitis. Patient was given a dose of IV Cipro and IV Flagyl in the ED. Patient was made NPO with IVF. Antiemetics and analgesics were administered as needed. ID was consulted and patient was started on a course of IV Ceftriaxone and IV Flagyl. Patient was found to have elevated liver enzymes after administration of IV Cipro x1. LFTs were closely monitored and returned to baseline. Patient's diet was advanced as tolerated. Patient has chronic, recurrent kidney stones for which she follows with Urology outpatient. CT showed a 4 mm mid-distal R ureteral calculus, b/l 3 mm non-obstructing renal calculi with no hydronephrosis. Urology was consulted???    Patient was medically optimized and improved clinically throughout hospital course. Patient seen and examined on day of discharge.    Vital Signs  T(C): 36.9 (16 May 2022 04:06), Max: 36.9 (16 May 2022 04:06)  T(F): 98.4 (16 May 2022 04:06), Max: 98.4 (16 May 2022 04:06)  HR: 60 (16 May 2022 04:06) (60 - 67)  BP: 114/74 (16 May 2022 04:06) (114/74 - 123/59)  RR: 17 (16 May 2022 04:06) (17 - 17)  SpO2: 94% (16 May 2022 04:06) (94% - 98%)    Physical Exam:  General: well-developed, well-nourished, NAD  HEENT: normocephalic, atraumatic, EOMI, moist mucous membranes   Neck: supple, non-tender, no masses  Neurology: AAOx3, sensation intact  Respiratory: clear to auscultation bilaterally; no wheezes, rhonchi, or rales  CV: regular rate and rhythm, soft S1/S2, no murmurs, rubs, or gallops  Abdominal: soft, non-tender, non-distended, bowel sounds present  Extremities: no clubbing, cyanosis, or edema; palpable peripheral pulses  Musculoskeletal: no joint erythema or warmth, no joint swelling   Skin: warm, dry, normal color    Patient is medically stable for discharge to ____ with outpatient follow up.  ---  CONSULTANTS:   ID: Dr. Piña  ---  TIME SPENT:   I, the attending physician, was physically present for the key portions of the evaluation and management (E/M) service provided. The total amount of time spent reviewing the hospital course, laboratory values, imaging findings, assessing/counseling the patient, discussing with consultant physicians, social work, nursing staff was -- minutes.     ---  FINAL DISCHARGE DIAGNOSIS LIST:  Please see last daily progress note for final discharge diagnoses    ---  Primary care provider was made aware of plan for discharge:  [    ] NO     [     ] YES       ADMISSION H+P:    HPI:  67 y/o F with a PMHx of anxiety, hypothyroidism, asthma, kidney stones (s/p lithotripsy on 5/4/22), who presents with 1 day of lower quadrant abd pain and associated flank pain. Pt has a hx of recurrent renal stones, and has had several lithotripsies with Dr. Moreira, the most recent being on the left side on 5/4/22, with a right lithotripsy scheduled for 5/23. Pain started last night, and acutely worsened this morning at 4 AM, associated with nausea and over 10 episodes of NBNB vomiting, and inability to tolerate PO. Pt has chills, but denies any fevers, chest pain, palpitations, SOB, URI sx, change in bowel habits, hematochezia, or LE pain/swelling. Pt does have some dysuria, which is typical for when she gets kidney stones. Pt was recently on nitrofurantoin prior to lithotripsy. Pain is currently 8/10. Her last colonoscopy was 7 years ago, and she has never had diverticulosis/diverticulitis before.    ED course:  VS T 97F, HR 84, /60, RR 18, SpO2 98% on RA  labs significant for mild leukocytosis, UA negative  CT A/P showing acute uncomplicated sigmoid diverticulitis, 4 mm mid-distal R ureteral calculus, b/l 3 mm non-obstructing renal calculi, no hydronephrosis  Incidental finding of 1 cm focal nodularity in RUQ c/w focal splenic artery aneurysm, stable since 2019  Given IV Tylenol, IV Dilaudid 0.5 mg x 2, Zofran 4 mg x 1, Reglan 10 mg x 1 in ED, 1 L NS bolus, IV Cipro and Flagyl x 1 (13 May 2022 14:51)      ---  HOSPITAL COURSE:   Patient was admitted to the hospital for acute diverticulitis. CT showed sigmoid diverticulitis. Patient was given a dose of IV Cipro and IV Flagyl in the ED. Patient was made NPO with IVF. Antiemetics and analgesics were administered as needed. ID was consulted and patient was started on a course of IV Ceftriaxone and IV Flagyl. Patient was found to have elevated liver enzymes after administration of IV Cipro x1. LFTs were closely monitored and returned to baseline. Patient's diet was advanced as tolerated. Patient has chronic, recurrent kidney stones for which she follows with Urology outpatient. CT showed a 4 mm mid-distal R ureteral calculus, b/l 3 mm non-obstructing renal calculi with no hydronephrosis. Urology was consulted and recommended________    Patient was medically optimized and improved clinically throughout hospital course. Patient seen and examined on day of discharge.    Vital Signs  T(C): 36.9 (16 May 2022 04:06), Max: 36.9 (16 May 2022 04:06)  T(F): 98.4 (16 May 2022 04:06), Max: 98.4 (16 May 2022 04:06)  HR: 60 (16 May 2022 04:06) (60 - 67)  BP: 114/74 (16 May 2022 04:06) (114/74 - 123/59)  RR: 17 (16 May 2022 04:06) (17 - 17)  SpO2: 94% (16 May 2022 04:06) (94% - 98%)    Physical Exam:  General: well-developed, well-nourished, NAD  HEENT: normocephalic, atraumatic, EOMI, moist mucous membranes   Neck: supple, non-tender, no masses  Neurology: AAOx3, sensation intact  Respiratory: clear to auscultation bilaterally; no wheezes, rhonchi, or rales  CV: regular rate and rhythm, soft S1/S2, no murmurs, rubs, or gallops  Abdominal: soft, non-tender, non-distended, bowel sounds present  Extremities: no clubbing, cyanosis, or edema; palpable peripheral pulses  Musculoskeletal: no joint erythema or warmth, no joint swelling   Skin: warm, dry, normal color    Patient is medically stable for discharge to ____ with outpatient follow up.  ---  CONSULTANTS:   ID: Dr. Piña  Urology: Dr. Moreira  ---  TIME SPENT:   I, the attending physician, was physically present for the key portions of the evaluation and management (E/M) service provided. The total amount of time spent reviewing the hospital course, laboratory values, imaging findings, assessing/counseling the patient, discussing with consultant physicians, social work, nursing staff was -- minutes.     ---  FINAL DISCHARGE DIAGNOSIS LIST:  Please see last daily progress note for final discharge diagnoses    ---  Primary care provider was made aware of plan for discharge:  [    ] NO     [     ] YES       ADMISSION H+P:    HPI:  65 y/o F with a PMHx of anxiety, hypothyroidism, asthma, kidney stones (s/p lithotripsy on 5/4/22), who presents with 1 day of lower quadrant abd pain and associated flank pain. Pt has a hx of recurrent renal stones, and has had several lithotripsies with Dr. Moreira, the most recent being on the left side on 5/4/22, with a right lithotripsy scheduled for 5/23. Pain started last night, and acutely worsened this morning at 4 AM, associated with nausea and over 10 episodes of NBNB vomiting, and inability to tolerate PO. Pt has chills, but denies any fevers, chest pain, palpitations, SOB, URI sx, change in bowel habits, hematochezia, or LE pain/swelling. Pt does have some dysuria, which is typical for when she gets kidney stones. Pt was recently on nitrofurantoin prior to lithotripsy. Pain is currently 8/10. Her last colonoscopy was 7 years ago, and she has never had diverticulosis/diverticulitis before.    ED course:  VS T 97F, HR 84, /60, RR 18, SpO2 98% on RA  labs significant for mild leukocytosis, UA negative  CT A/P showing acute uncomplicated sigmoid diverticulitis, 4 mm mid-distal R ureteral calculus, b/l 3 mm non-obstructing renal calculi, no hydronephrosis  Incidental finding of 1 cm focal nodularity in RUQ c/w focal splenic artery aneurysm, stable since 2019  Given IV Tylenol, IV Dilaudid 0.5 mg x 2, Zofran 4 mg x 1, Reglan 10 mg x 1 in ED, 1 L NS bolus, IV Cipro and Flagyl x 1 (13 May 2022 14:51)      ---  HOSPITAL COURSE:   Patient was admitted to the hospital for acute diverticulitis. CT showed sigmoid diverticulitis. Patient was given a dose of IV Cipro and IV Flagyl in the ED. Patient was made NPO with IVF. Antiemetics and analgesics were administered as needed. ID was consulted and patient was started on a course of IV Ceftriaxone and IV Flagyl. Patient was found to have elevated liver enzymes after administration of IV Cipro x1. LFTs were closely monitored and returned to baseline. Patient's diet was advanced as tolerated. Patient has chronic, recurrent kidney stones for which she follows with Urology outpatient. CT showed a 4 mm mid-distal R ureteral calculus, b/l 3 mm non-obstructing renal calculi with no hydronephrosis. Patient's diet was advanced as tolerated and antibiotics were switched to oral Cipro and Flagyl prior to discharge.     Patient was medically optimized and improved clinically throughout hospital course. Patient seen and examined on day of discharge.    Vital Signs  T(C): 36.9 (18 May 2022 04:45), Max: 36.9 (18 May 2022 04:45)  T(F): 98.5 (18 May 2022 04:45), Max: 98.5 (18 May 2022 04:45)  HR: 72 (18 May 2022 04:45) (56 - 72)  BP: 130/78 (18 May 2022 04:45) (130/78 - 152/85)  BP(mean): --  RR: 17 (18 May 2022 04:45) (16 - 18)  SpO2: 96% (18 May 2022 04:45) (95% - 99%)    Physical Exam:  General: well-developed, well-nourished, NAD  HEENT: normocephalic, atraumatic, EOMI, moist mucous membranes   Neck: supple, non-tender, no masses  Neurology: AAOx3, sensation intact  Respiratory: clear to auscultation bilaterally; no wheezes, rhonchi, or rales  CV: regular rate and rhythm, soft S1/S2, no murmurs, rubs, or gallops  Abdominal: tenderness to palpation in b/l lower abd quadrants and b/l flanks  Extremities: no clubbing, cyanosis, or edema; palpable peripheral pulses  Musculoskeletal: no joint erythema or warmth, no joint swelling   Skin: warm, dry, normal color    Patient is medically stable for discharge home with outpatient follow up.  ---  CONSULTANTS:   ID: Dr. Robin  ---  TIME SPENT:   I, the attending physician, was physically present for the key portions of the evaluation and management (E/M) service provided. The total amount of time spent reviewing the hospital course, laboratory values, imaging findings, assessing/counseling the patient, discussing with consultant physicians, social work, nursing staff was -- minutes.     ---  FINAL DISCHARGE DIAGNOSIS LIST:  Please see last daily progress note for final discharge diagnoses    ---  Primary care provider was made aware of plan for discharge:  [    ] NO     [     ] YES       ADMISSION H+P:    HPI:  67 y/o F with a PMHx of anxiety, hypothyroidism, asthma, kidney stones (s/p lithotripsy on 5/4/22), who presents with 1 day of lower quadrant abd pain and associated flank pain. Pt has a hx of recurrent renal stones, and has had several lithotripsies with Dr. Moreira, the most recent being on the left side on 5/4/22, with a right lithotripsy scheduled for 5/23. Pain started last night, and acutely worsened this morning at 4 AM, associated with nausea and over 10 episodes of NBNB vomiting, and inability to tolerate PO. Pt has chills, but denies any fevers, chest pain, palpitations, SOB, URI sx, change in bowel habits, hematochezia, or LE pain/swelling. Pt does have some dysuria, which is typical for when she gets kidney stones. Pt was recently on nitrofurantoin prior to lithotripsy. Pain is currently 8/10. Her last colonoscopy was 7 years ago, and she has never had diverticulosis/diverticulitis before.    ED course:  VS T 97F, HR 84, /60, RR 18, SpO2 98% on RA  labs significant for mild leukocytosis, UA negative  CT A/P showing acute uncomplicated sigmoid diverticulitis, 4 mm mid-distal R ureteral calculus, b/l 3 mm non-obstructing renal calculi, no hydronephrosis  Incidental finding of 1 cm focal nodularity in RUQ c/w focal splenic artery aneurysm, stable since 2019  Given IV Tylenol, IV Dilaudid 0.5 mg x 2, Zofran 4 mg x 1, Reglan 10 mg x 1 in ED, 1 L NS bolus, IV Cipro and Flagyl x 1 (13 May 2022 14:51)      ---  HOSPITAL COURSE:   Patient was admitted to the hospital for acute diverticulitis. CT showed sigmoid diverticulitis. Patient was given a dose of IV Cipro and IV Flagyl in the ED. Patient was made NPO with IVF. Antiemetics and analgesics were administered as needed. ID was consulted and patient was started on a course of IV Ceftriaxone and IV Flagyl. Patient was found to have elevated liver enzymes after administration of IV Cipro x1. LFTs were closely monitored and returned to baseline. Patient's diet was advanced as tolerated. Patient has chronic, recurrent kidney stones for which she follows with Urology outpatient. CT showed a 4 mm mid-distal R ureteral calculus, b/l 3 mm non-obstructing renal calculi with no hydronephrosis. Urology was consulted and recommended outpatient follow up. Patient's diet was advanced as tolerated and antibiotics were switched to oral Cipro and Flagyl prior to discharge.     Patient was medically optimized and improved clinically throughout hospital course. Patient seen and examined on day of discharge.    Vital Signs  T(C): 36.9 (18 May 2022 04:45), Max: 36.9 (18 May 2022 04:45)  T(F): 98.5 (18 May 2022 04:45), Max: 98.5 (18 May 2022 04:45)  HR: 72 (18 May 2022 04:45) (56 - 72)  BP: 130/78 (18 May 2022 04:45) (130/78 - 152/85)  BP(mean): --  RR: 17 (18 May 2022 04:45) (16 - 18)  SpO2: 96% (18 May 2022 04:45) (95% - 99%)    Physical Exam:  General: well-developed, well-nourished, NAD  HEENT: normocephalic, atraumatic, EOMI, moist mucous membranes   Neck: supple, non-tender, no masses  Neurology: AAOx3, sensation intact  Respiratory: clear to auscultation bilaterally; no wheezes, rhonchi, or rales  CV: regular rate and rhythm, soft S1/S2, no murmurs, rubs, or gallops  Abdominal: mild tenderness to palpation in b/l lower abd quadrants and b/l flanks  Extremities: no clubbing, cyanosis, or edema; palpable peripheral pulses  Musculoskeletal: no joint erythema or warmth, no joint swelling   Skin: warm, dry, normal color    Patient is medically stable for discharge home with outpatient follow up.  ---  CONSULTANTS:   ID: Dr. Robin  Urology: Dr. Moreira  ---  TIME SPENT:   I, the attending physician, was physically present for the key portions of the evaluation and management (E/M) service provided. The total amount of time spent reviewing the hospital course, laboratory values, imaging findings, assessing/counseling the patient, discussing with consultant physicians, social work, nursing staff was -- minutes.     ---  FINAL DISCHARGE DIAGNOSIS LIST:  Please see last daily progress note for final discharge diagnoses    ---  Primary care provider was made aware of plan for discharge:  [    ] NO     [     ] YES       ADMISSION H+P:    HPI:  65 y/o F with a PMHx of anxiety, hypothyroidism, asthma, kidney stones (s/p lithotripsy on 5/4/22), who presents with 1 day of lower quadrant abd pain and associated flank pain. Pt has a hx of recurrent renal stones, and has had several lithotripsies with Dr. Moreira, the most recent being on the left side on 5/4/22, with a right lithotripsy scheduled for 5/23. Pain started last night, and acutely worsened this morning at 4 AM, associated with nausea and over 10 episodes of NBNB vomiting, and inability to tolerate PO. Pt has chills, but denies any fevers, chest pain, palpitations, SOB, URI sx, change in bowel habits, hematochezia, or LE pain/swelling. Pt does have some dysuria, which is typical for when she gets kidney stones. Pt was recently on nitrofurantoin prior to lithotripsy. Pain is currently 8/10. Her last colonoscopy was 7 years ago, and she has never had diverticulosis/diverticulitis before.    ED course:  VS T 97F, HR 84, /60, RR 18, SpO2 98% on RA  labs significant for mild leukocytosis, UA negative  CT A/P showing acute uncomplicated sigmoid diverticulitis, 4 mm mid-distal R ureteral calculus, b/l 3 mm non-obstructing renal calculi, no hydronephrosis  Incidental finding of 1 cm focal nodularity in RUQ c/w focal splenic artery aneurysm, stable since 2019  Given IV Tylenol, IV Dilaudid 0.5 mg x 2, Zofran 4 mg x 1, Reglan 10 mg x 1 in ED, 1 L NS bolus, IV Cipro and Flagyl x 1 (13 May 2022 14:51)      ---  HOSPITAL COURSE:   Patient was admitted to the hospital for acute diverticulitis. CT showed sigmoid diverticulitis. Patient was given a dose of IV Cipro and IV Flagyl in the ED. Patient was made NPO with IVF. Antiemetics and analgesics were administered as needed. ID was consulted and patient was started on a course of IV Ceftriaxone and IV Flagyl. Patient was found to have elevated liver enzymes after administration of IV Cipro x1. LFTs were closely monitored and returned to baseline. Patient's diet was advanced as tolerated. Patient has chronic, recurrent kidney stones for which she follows with Urology outpatient. CT showed a 4 mm mid-distal R ureteral calculus, b/l 3 mm non-obstructing renal calculi with no hydronephrosis. Urology was consulted and recommended outpatient follow up. Patient's diet was advanced as tolerated and antibiotics were switched to oral Cipro and Flagyl prior to discharge.     Patient was medically optimized and improved clinically throughout hospital course. Patient seen and examined on day of discharge.    Vital Signs  T(C): 36.9 (18 May 2022 04:45), Max: 36.9 (18 May 2022 04:45)  T(F): 98.5 (18 May 2022 04:45), Max: 98.5 (18 May 2022 04:45)  HR: 72 (18 May 2022 04:45) (56 - 72)  BP: 130/78 (18 May 2022 04:45) (130/78 - 152/85)  BP(mean): --  RR: 17 (18 May 2022 04:45) (16 - 18)  SpO2: 96% (18 May 2022 04:45) (95% - 99%)    Physical Exam:  General: well-developed, well-nourished, NAD  HEENT: normocephalic, atraumatic, EOMI, moist mucous membranes   Neck: supple, non-tender, no masses  Neurology: AAOx3, sensation intact  Respiratory: clear to auscultation bilaterally; no wheezes, rhonchi, or rales  CV: regular rate and rhythm, soft S1/S2, no murmurs, rubs, or gallops  Abdominal: mild tenderness to palpation in b/l lower abd quadrants and b/l flanks  Extremities: no clubbing, cyanosis, or edema; palpable peripheral pulses  Musculoskeletal: no joint erythema or warmth, no joint swelling   Skin: warm, dry, normal color    Patient is medically stable for discharge home with outpatient follow up.  ---  CONSULTANTS:   ID: Dr. Robin  Urology: Dr. Moreira  ---  TIME SPENT:   I, the attending physician, was physically present for the key portions of the evaluation and management (E/M) service provided. The total amount of time spent reviewing the hospital course, laboratory values, imaging findings, assessing/counseling the patient, discussing with consultant physicians, social work, nursing staff was 35 minutes.     ---  FINAL DISCHARGE DIAGNOSIS LIST:  Please see last daily progress note for final discharge diagnoses    ---  Primary care provider was made aware of plan for discharge:  [    ] NO     [     ] YES

## 2022-05-16 NOTE — DISCHARGE NOTE PROVIDER - PROVIDER TOKENS
No PROVIDER:[TOKEN:[[id number]]],PROVIDER:[TOKEN:[[id number]],FOLLOWUP:[1 week]] PROVIDER:[TOKEN:[[id number]]],PROVIDER:[TOKEN:[[id number]],FOLLOWUP:[1 week]],PROVIDER:[TOKEN:[[id number]],FOLLOWUP:[1-3 days]]

## 2022-05-16 NOTE — PROGRESS NOTE ADULT - SUBJECTIVE AND OBJECTIVE BOX
[facility]  INFECTIOUS DISEASES   [address] [place] [address]  Tel: [phone]     Fax: [phone]  ======================================================  MD Sharda Lovelace Kaushal, MD Cho, Michelle, MD   ======================================================    Assessment/ Recommendation:    66-year-old female with history of anal fissure, lithotripsy for renal stones, anxiety, hypothyroidism, asthma presented with left lower quadrant abdominal pain with flank pain associated with nausea vomiting being treated for  acute uncomplicated sigmoid diverticulitis with incidental hepatic steatosis and right ureteral calculus and history of multiple allergies including 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 with hives     urine culture: May 13: Negative   hepatitis C antibody: Negative   COVID PCR: Negative     normalized WBC, afebrile,  improved symptoms of nausea and abdominal pain   advancement of diet as per primary team     continue with Rocephin 1 g daily and Flagyl 500 mg every 8 hours, no hypersensitivity reaction with Rocephin through the hospital course hence we will continue with that  Daily CBC with differential, renal function with electrolytes while inpatient and on antibiotics ;  improving ALT   eventual colonoscopy as an outpatient   low to no yield of getting blood culture unless clinical exam  change for worse, hemodynamically unstable, persistently new fever    Plan explained to patient   D/w Primary team and consultants     ________________________________    Last 24 hours:    denied any further nausea vomiting since yesterday afternoon but says that she has not had any food either   denied any fever or chills    complaints of abdominal pain improved   soft bowel movement but denied any watery diarrhea or hematochezia   denied any hematuria dysuria or flank tenderness    Further ROS:  All systems reviewed with symptoms mentions as above.     ______________________________  Allergies    codeine (Anaphylaxis; Hives)  fish (Anaphylaxis)  latex (Anaphylaxis)  morphine (Nausea; Vomiting)  penicillin (Hives)      ANTIBIOTICS/RELEVANT:  antimicrobials  cefTRIAXone   IVPB      cefTRIAXone   IVPB 1000 milliGRAM(s) IV Intermittent every 24 hours  metroNIDAZOLE  IVPB 500 milliGRAM(s) IV Intermittent every 8 hours    immunologic:    OTHER:  acetaminophen     Tablet .. 650 milliGRAM(s) Oral every 6 hours PRN  acetaminophen 300 mG/butalbital 50 mG/ caffeine 40 mG 1 Capsule(s) Oral every 8 hours PRN  ALBUTerol    90 MICROgram(s) HFA Inhaler 1 Puff(s) Inhalation every 6 hours PRN  benzonatate 200 milliGRAM(s) Oral three times a day PRN  budesonide 160 MICROgram(s)/formoterol 4.5 MICROgram(s) Inhaler 2 Puff(s) Inhalation two times a day  clonazePAM  Tablet 1 milliGRAM(s) Oral at bedtime  dextrose 5% + sodium chloride 0.45% with potassium chloride 20 mEq/L 1000 milliLiter(s) IV Continuous <Continuous>  enoxaparin Injectable 40 milliGRAM(s) SubCutaneous every 24 hours  HYDROmorphone  Injectable 0.25 milliGRAM(s) IV Push every 4 hours PRN  HYDROmorphone  Injectable 0.5 milliGRAM(s) IV Push every 4 hours PRN  levothyroxine 137 MICROGram(s) Oral daily  melatonin 3 milliGRAM(s) Oral at bedtime PRN  metoclopramide Injectable 10 milliGRAM(s) IV Push every 6 hours PRN  montelukast 10 milliGRAM(s) Oral daily  ondansetron Injectable 4 milliGRAM(s) IV Push every 4 hours PRN  pantoprazole  Injectable 40 milliGRAM(s) IV Push daily  potassium chloride    Tablet ER 40 milliEquivalent(s) Oral once  tamsulosin 0.4 milliGRAM(s) Oral at bedtime  trimethobenzamide Injectable 200 milliGRAM(s) IntraMuscular three times a day PRN      _________________    PHYSICAL EXAM:    Objective:  Vital Signs Last 24 Hrs  T(C): 36.9 (16 May 2022 04:06), Max: 36.9 (16 May 2022 04:06)  T(F): 98.4 (16 May 2022 04:06), Max: 98.4 (16 May 2022 04:06)  HR: 60 (16 May 2022 04:06) (60 - 67)  BP: 114/74 (16 May 2022 04:06) (114/74 - 123/59)  BP(mean): --  RR: 17 (16 May 2022 04:06) (17 - 17)  SpO2: 94% (16 May 2022 04:06) (94% - 98%)    General: No acute distress.  Lying in bed comfortably   Neuro: AAO*3, No motor, sensory, or cranial nerve deficit  HEENT: Pupils equal, reactive to light, Oral mucosa moist  PULM: Clear to auscultation bilaterally, no significant adventitious breath sounds   CVS: Regular rhythm and controlled rate, no murmurs, rubs, or gallops  ABD: left lower quadrant and right upper quadrant tenderness more pronounced in the left lower quadrant with normal bowel sounds no rebound with no CVA tenderness or suprapubic tenderness  EXT: No b/l LE edema, nontender with pedal pulse palpable   SKIN: Warm and well perfused, no acute rashes   ______________  LABS, MICROBIOLOGY, RADIOLOGY & ADDITIONAL STUDIES: Reviewed

## 2022-05-16 NOTE — PROGRESS NOTE ADULT - ASSESSMENT
65 y/o F with a PMHx of anxiety, hypothyroidism, asthma, kidney stones (s/p lithotripsy on 5/4) admitted for acute sigmoid diverticulitis with concurrent b/l non-obstructing renal stones and R ureteral stone, on IV Cipro/Flagyl, failed advanced diet this AM, maintain NPO for now. 67 y/o F with a PMHx of anxiety, hypothyroidism, asthma, kidney stones (s/p lithotripsy on 5/4) admitted for acute sigmoid diverticulitis with concurrent b/l non-obstructing renal stones and R ureteral stone, on IV abx

## 2022-05-16 NOTE — PROGRESS NOTE ADULT - PROBLEM SELECTOR PLAN 2
Likely anemia of inflammation in the setting of infection   - Hgb 12.9 on admission, 10.4 today  - Currently hemodynamically stable, no signs or symptoms of active bleeding  - Monitor CBC Likely anemia of inflammation in the setting of infection   - Hgb 12.9 on admission  - Currently hemodynamically stable, no signs or symptoms of active bleeding  - F/u iron, folate, ferritin, TIBC, B12  - F/u FOBT  - Monitor CBC

## 2022-05-16 NOTE — PROGRESS NOTE ADULT - PROBLEM SELECTOR PLAN 1
- Pt presented with 1 day of acute LQ abd pain, N/V, inability to tolerate PO  - CT scan showing acute uncomplicated sigmoid diverticulitis, mild leukocytosis but pt does not meet sepsis criteria  - Given IV Cipro/Flagyl in ED  - Continue IV CTX and IV Flagyl  - NPO except meds for now as she failed trial of clear liquid diet this AM.   - IV D5 + 1/2 NS @ 75 cc/hr, re-evaluate need for fluids pending clinical course and tolerance for PO diet  - Pain control with Tylenol 650 mg for mild, Dilaudid 0.25 mg q4h for moderate, and 0.5 mg q4h for severe   - Zofran prn for nausea  - Monitor abd exams, WBC count, fever curve - Pt presented with 1 day of acute LQ abd pain, N/V, inability to tolerate PO  - CT scan showing acute uncomplicated sigmoid diverticulitis, mild leukocytosis but pt does not meet sepsis criteria  - Given IV Cipro/Flagyl in ED  - Continue IV CTX and IV Flagyl  - Advanced to clear liquid diet this AM  - Pain control with Tylenol 650 mg for mild, Dilaudid 0.25 mg q4h for moderate, and 0.5 mg q4h for severe   - Zofran prn for nausea  - Monitor abd exams, WBC count, fever curve  - ID (Dr. Robin) consulted, recs appreciated

## 2022-05-16 NOTE — DISCHARGE NOTE PROVIDER - NSDCCPCAREPLAN_GEN_ALL_CORE_FT
PRINCIPAL DISCHARGE DIAGNOSIS  Diagnosis: Diverticulitis  Assessment and Plan of Treatment:       SECONDARY DISCHARGE DIAGNOSES  Diagnosis: Bilateral renal stones  Assessment and Plan of Treatment:     Diagnosis: Transaminitis  Assessment and Plan of Treatment:     Diagnosis: Chronic asthma  Assessment and Plan of Treatment:     Diagnosis: Anxiety  Assessment and Plan of Treatment:     Diagnosis: Hypothyroidism  Assessment and Plan of Treatment:     Diagnosis: Imaging abnormalities  Assessment and Plan of Treatment:      PRINCIPAL DISCHARGE DIAGNOSIS  Diagnosis: Diverticulitis  Assessment and Plan of Treatment: You were admitted with diverticulitis   Diverticulitis is inflammation or infection of small pouches in your colon. You were treated with antibiotics with improvement in your clinical condition. Continue to take Ciprofloxacin 500mg twice daily and Metronidazole 500mg three times daily. Prescriptions for these medications were sent to your pharmacy. Please follow up with GI within 3-5 days of discharge.      SECONDARY DISCHARGE DIAGNOSES  Diagnosis: Bilateral renal stones  Assessment and Plan of Treatment: You have chronic kidney and stones for which you see urologist, Dr. Moreira, outpatient. Dr. Moreira evaluated you while you were in the hospital and recommended you follow up with him within 1 week.    Diagnosis: Transaminitis  Assessment and Plan of Treatment: You were found to have elevated liver enzymes. An ultrasound of your abdomen was done and showed a L-sided liver cyst with thin septation measuring 1.8 cm, as well as hepatic steatosis. Please follow up with GI within 3-5 days of discharge for evaluation of this finding.    Diagnosis: Imaging abnormalities  Assessment and Plan of Treatment: CT scan showed:  - Left hepatic lobe cystic lesion measures 1.9 cm, previously 1.2 cm on 11/13/2019  - 1 cm nodular density in the left upper quadrant adjacent to the stomach and left adrenal gland, tubular in orientation on sagittal images, favored to represent a focal splenic artery aneurysm, stable in size since 2019  Please follow up with GI within 3-5 days of discharge.    Diagnosis: Hypokalemia  Assessment and Plan of Treatment: You were found to have low potassium levels during your hospital stay. Your potassium levels were monitored daily and repleted as needed. Please start taking Potassium 20meq daily tablet for 2 days and follow up with your PCP within 1 week to have your potassium level checked.    Diagnosis: Anemia  Assessment and Plan of Treatment: Your were found to have low hemoglobin throughout the hospital course. Your hgb was 12.9 on admission and was closely monitored. You did not require blood transfusions or iron supplementation.    Diagnosis: Chronic asthma  Assessment and Plan of Treatment: Continue Symbicort and Montelukast daily. Continue Albuterol as needed.    Diagnosis: Anxiety  Assessment and Plan of Treatment: Continue Alprazolam as needed.    Diagnosis: Hypothyroidism  Assessment and Plan of Treatment: Continue Synthroid 137mcg daily.     PRINCIPAL DISCHARGE DIAGNOSIS  Diagnosis: Diverticulitis  Assessment and Plan of Treatment: You were admitted with diverticulitis   Diverticulitis is inflammation or infection of small pouches in your colon. You were treated with antibiotics with improvement in your clinical condition. Continue to take Ciprofloxacin 500mg twice daily and Metronidazole 500mg three times daily. Prescriptions for these medications were sent to your pharmacy. Please follow up with GI within 3-5 days of discharge.      SECONDARY DISCHARGE DIAGNOSES  Diagnosis: Transaminitis  Assessment and Plan of Treatment: You were found to have elevated liver enzymes. An ultrasound of your abdomen was done and showed a L-sided liver cyst with thin septation measuring 1.8 cm, as well as hepatic steatosis. Please follow up with GI within 3-5 days of discharge for evaluation of this finding.    Diagnosis: Bilateral renal stones  Assessment and Plan of Treatment: You have chronic kidney and stones for which you see urologist, Dr. Moreira, outpatient. Dr. Moreira evaluated you while you were in the hospital and recommended you follow up with him within 1 week.    Diagnosis: Chronic asthma  Assessment and Plan of Treatment: Continue Symbicort and Montelukast daily. Continue Albuterol as needed.    Diagnosis: Anxiety  Assessment and Plan of Treatment: Continue Alprazolam as needed.    Diagnosis: Hypothyroidism  Assessment and Plan of Treatment: Continue Synthroid 137mcg daily.    Diagnosis: Imaging abnormalities  Assessment and Plan of Treatment: CT scan showed:  - Left hepatic lobe cystic lesion measures 1.9 cm, previously 1.2 cm on 11/13/2019  - 1 cm nodular density in the left upper quadrant adjacent to the stomach and left adrenal gland, tubular in orientation on sagittal images, favored to represent a focal splenic artery aneurysm, stable in size since 2019  Please follow up with GI within 3-5 days of discharge.    Diagnosis: Anemia  Assessment and Plan of Treatment: Your were found to have low hemoglobin throughout the hospital course. Your hgb was 12.9 on admission and was closely monitored. You did not require blood transfusions or iron supplementation.    Diagnosis: Hypokalemia  Assessment and Plan of Treatment: You were found to have low potassium levels during your hospital stay. Your potassium levels were monitored daily and repleted as needed. Please start taking Potassium 20meq daily tablet for 2 days and follow up with your PCP within 1 week to have your potassium level checked.

## 2022-05-16 NOTE — PROGRESS NOTE ADULT - SUBJECTIVE AND OBJECTIVE BOX
CHARTING IN PROGRESS     Patient is a 66y old  Female who presents with a chief complaint of Diverticulitis (15 May 2022 09:42)      INTERVAL HPI/OVERNIGHT EVENTS:    MEDICATIONS  (STANDING):  budesonide 160 MICROgram(s)/formoterol 4.5 MICROgram(s) Inhaler 2 Puff(s) Inhalation two times a day  cefTRIAXone   IVPB      cefTRIAXone   IVPB 1000 milliGRAM(s) IV Intermittent every 24 hours  clonazePAM  Tablet 1 milliGRAM(s) Oral at bedtime  dextrose 5% + sodium chloride 0.45% with potassium chloride 20 mEq/L 1000 milliLiter(s) (75 mL/Hr) IV Continuous <Continuous>  enoxaparin Injectable 40 milliGRAM(s) SubCutaneous every 24 hours  levothyroxine 137 MICROGram(s) Oral daily  metroNIDAZOLE  IVPB 500 milliGRAM(s) IV Intermittent every 8 hours  montelukast 10 milliGRAM(s) Oral daily  pantoprazole  Injectable 40 milliGRAM(s) IV Push daily  tamsulosin 0.4 milliGRAM(s) Oral at bedtime    MEDICATIONS  (PRN):  acetaminophen     Tablet .. 650 milliGRAM(s) Oral every 6 hours PRN Temp greater or equal to 38C (100.4F), Mild Pain (1 - 3)  acetaminophen 300 mG/butalbital 50 mG/ caffeine 40 mG 1 Capsule(s) Oral every 8 hours PRN migraine  ALBUTerol    90 MICROgram(s) HFA Inhaler 1 Puff(s) Inhalation every 6 hours PRN Shortness of Breath and/or Wheezing  benzonatate 200 milliGRAM(s) Oral three times a day PRN Cough  HYDROmorphone  Injectable 0.25 milliGRAM(s) IV Push every 4 hours PRN Moderate Pain (4 - 6)  HYDROmorphone  Injectable 0.5 milliGRAM(s) IV Push every 4 hours PRN Severe Pain (7 - 10)  melatonin 3 milliGRAM(s) Oral at bedtime PRN Insomnia  metoclopramide Injectable 10 milliGRAM(s) IV Push every 6 hours PRN nausea/vomiting  ondansetron Injectable 4 milliGRAM(s) IV Push every 4 hours PRN Nausea and/or Vomiting  trimethobenzamide Injectable 200 milliGRAM(s) IntraMuscular three times a day PRN Nausea and/or Vomiting      Allergies    codeine (Anaphylaxis; Hives)  fish (Anaphylaxis)  latex (Anaphylaxis)  morphine (Nausea; Vomiting)  penicillin (Hives)    Intolerances        REVIEW OF SYSTEMS:  CONSTITUTIONAL: No fever or chills  HEENT:  No headache, no sore throat  RESPIRATORY: No cough, wheezing, or shortness of breath  CARDIOVASCULAR: No chest pain, palpitations  GASTROINTESTINAL: No abd pain, nausea, vomiting, or diarrhea  GENITOURINARY: No dysuria, frequency, or hematuria  NEUROLOGICAL: no focal weakness or dizziness  MUSCULOSKELETAL: no myalgias     Vital Signs Last 24 Hrs  T(C): 36.9 (16 May 2022 04:06), Max: 36.9 (16 May 2022 04:06)  T(F): 98.4 (16 May 2022 04:06), Max: 98.4 (16 May 2022 04:06)  HR: 60 (16 May 2022 04:06) (60 - 67)  BP: 114/74 (16 May 2022 04:06) (114/74 - 123/59)  BP(mean): --  RR: 17 (16 May 2022 04:06) (17 - 17)  SpO2: 94% (16 May 2022 04:06) (94% - 98%)    PHYSICAL EXAM:  GENERAL: NAD  HEENT:  anicteric, moist mucous membranes  CHEST/LUNG:  CTA b/l, no rales, wheezes, or rhonchi  HEART:  RRR, S1, S2  ABDOMEN:  BS+, soft, nontender, nondistended  EXTREMITIES: no edema, cyanosis, or calf tenderness  NERVOUS SYSTEM: answers questions and follows commands appropriately    LABS:                        10.8   8.88  )-----------( 198      ( 16 May 2022 08:01 )             31.5     CBC Full  -  ( 16 May 2022 08:01 )  WBC Count : 8.88 K/uL  Hemoglobin : 10.8 g/dL  Hematocrit : 31.5 %  Platelet Count - Automated : 198 K/uL  Mean Cell Volume : 89.7 fl  Mean Cell Hemoglobin : 30.8 pg  Mean Cell Hemoglobin Concentration : 34.3 gm/dL  Auto Neutrophil # : 5.78 K/uL  Auto Lymphocyte # : 2.33 K/uL  Auto Monocyte # : 0.66 K/uL  Auto Eosinophil # : 0.05 K/uL  Auto Basophil # : 0.03 K/uL  Auto Neutrophil % : 65.2 %  Auto Lymphocyte % : 26.2 %  Auto Monocyte % : 7.4 %  Auto Eosinophil % : 0.6 %  Auto Basophil % : 0.3 %      Ca    8.7        15 May 2022 08:21          CAPILLARY BLOOD GLUCOSE            Culture - Urine (collected 05-13-22 @ 12:33)  Source: Clean Catch Clean Catch (Midstream)  Final Report (05-14-22 @ 11:19):    <10,000 CFU/mL Normal Urogenital Katarina        RADIOLOGY & ADDITIONAL TESTS:    Personally reviewed.     Consultant(s) Notes Reviewed:  [x] YES  [ ] NO     Patient is a 66y old  Female who presents with a chief complaint of Diverticulitis (15 May 2022 09:42)      INTERVAL HPI/OVERNIGHT EVENTS: No acute events overnight. Patient seen and examined at bedside. Admits to persistent nausea and 1 episode of emesis yesterday. Denies fever/chills, chest pain, shortness of breath, headache, dizziness.     MEDICATIONS  (STANDING):  budesonide 160 MICROgram(s)/formoterol 4.5 MICROgram(s) Inhaler 2 Puff(s) Inhalation two times a day  cefTRIAXone   IVPB      cefTRIAXone   IVPB 1000 milliGRAM(s) IV Intermittent every 24 hours  clonazePAM  Tablet 1 milliGRAM(s) Oral at bedtime  dextrose 5% + sodium chloride 0.45% with potassium chloride 20 mEq/L 1000 milliLiter(s) (75 mL/Hr) IV Continuous <Continuous>  enoxaparin Injectable 40 milliGRAM(s) SubCutaneous every 24 hours  levothyroxine 137 MICROGram(s) Oral daily  metroNIDAZOLE  IVPB 500 milliGRAM(s) IV Intermittent every 8 hours  montelukast 10 milliGRAM(s) Oral daily  pantoprazole  Injectable 40 milliGRAM(s) IV Push daily  tamsulosin 0.4 milliGRAM(s) Oral at bedtime    MEDICATIONS  (PRN):  acetaminophen     Tablet .. 650 milliGRAM(s) Oral every 6 hours PRN Temp greater or equal to 38C (100.4F), Mild Pain (1 - 3)  acetaminophen 300 mG/butalbital 50 mG/ caffeine 40 mG 1 Capsule(s) Oral every 8 hours PRN migraine  ALBUTerol    90 MICROgram(s) HFA Inhaler 1 Puff(s) Inhalation every 6 hours PRN Shortness of Breath and/or Wheezing  benzonatate 200 milliGRAM(s) Oral three times a day PRN Cough  HYDROmorphone  Injectable 0.25 milliGRAM(s) IV Push every 4 hours PRN Moderate Pain (4 - 6)  HYDROmorphone  Injectable 0.5 milliGRAM(s) IV Push every 4 hours PRN Severe Pain (7 - 10)  melatonin 3 milliGRAM(s) Oral at bedtime PRN Insomnia  metoclopramide Injectable 10 milliGRAM(s) IV Push every 6 hours PRN nausea/vomiting  ondansetron Injectable 4 milliGRAM(s) IV Push every 4 hours PRN Nausea and/or Vomiting  trimethobenzamide Injectable 200 milliGRAM(s) IntraMuscular three times a day PRN Nausea and/or Vomiting      Allergies    codeine (Anaphylaxis; Hives)  fish (Anaphylaxis)  latex (Anaphylaxis)  morphine (Nausea; Vomiting)  penicillin (Hives)    Intolerances        REVIEW OF SYSTEMS:  CONSTITUTIONAL: No fever or chills  HEENT:  No headache  RESPIRATORY: No shortness of breath  CARDIOVASCULAR: No chest pain  GASTROINTESTINAL: Admits to abd pain, nausea, vomiting x1 since yesterday, and soft stools. No diarrhea  GENITOURINARY: No dysuria, frequency, or hematuria  NEUROLOGICAL: no dizziness  MUSCULOSKELETAL: no myalgias     Vital Signs Last 24 Hrs  T(C): 36.9 (16 May 2022 04:06), Max: 36.9 (16 May 2022 04:06)  T(F): 98.4 (16 May 2022 04:06), Max: 98.4 (16 May 2022 04:06)  HR: 60 (16 May 2022 04:06) (60 - 67)  BP: 114/74 (16 May 2022 04:06) (114/74 - 123/59)  BP(mean): --  RR: 17 (16 May 2022 04:06) (17 - 17)  SpO2: 94% (16 May 2022 04:06) (94% - 98%)    PHYSICAL EXAM:  GENERAL: NAD, laying in bed  HEENT:  anicteric, moist mucous membranes  CHEST/LUNG:  CTA b/l, no rales, wheezes, or rhonchi  HEART:  RRR, S1, S2  ABDOMEN: tenderness to palpation in LLQ  EXTREMITIES: no edema, cyanosis, or calf tenderness  NERVOUS SYSTEM: answers questions and follows commands appropriately    LABS:                        10.8   8.88  )-----------( 198      ( 16 May 2022 08:01 )             31.5     CBC Full  -  ( 16 May 2022 08:01 )  WBC Count : 8.88 K/uL  Hemoglobin : 10.8 g/dL  Hematocrit : 31.5 %  Platelet Count - Automated : 198 K/uL  Mean Cell Volume : 89.7 fl  Mean Cell Hemoglobin : 30.8 pg  Mean Cell Hemoglobin Concentration : 34.3 gm/dL  Auto Neutrophil # : 5.78 K/uL  Auto Lymphocyte # : 2.33 K/uL  Auto Monocyte # : 0.66 K/uL  Auto Eosinophil # : 0.05 K/uL  Auto Basophil # : 0.03 K/uL  Auto Neutrophil % : 65.2 %  Auto Lymphocyte % : 26.2 %  Auto Monocyte % : 7.4 %  Auto Eosinophil % : 0.6 %  Auto Basophil % : 0.3 %      Ca    8.7        15 May 2022 08:21          CAPILLARY BLOOD GLUCOSE            Culture - Urine (collected 05-13-22 @ 12:33)  Source: Clean Catch Clean Catch (Midstream)  Final Report (05-14-22 @ 11:19):    <10,000 CFU/mL Normal Urogenital Katarina        RADIOLOGY & ADDITIONAL TESTS:    Personally reviewed.     Consultant(s) Notes Reviewed:  [x] YES  [ ] NO     Patient is a 66y old  Female who presents with a chief complaint of Diverticulitis (15 May 2022 09:42)      INTERVAL HPI/OVERNIGHT EVENTS: No acute events overnight. Patient seen and examined at bedside. Admits to persistent nausea and 1 episode of emesis yesterday. Denies fever/chills, chest pain, shortness of breath, headache, dizziness.     MEDICATIONS  (STANDING):  budesonide 160 MICROgram(s)/formoterol 4.5 MICROgram(s) Inhaler 2 Puff(s) Inhalation two times a day  cefTRIAXone   IVPB      cefTRIAXone   IVPB 1000 milliGRAM(s) IV Intermittent every 24 hours  clonazePAM  Tablet 1 milliGRAM(s) Oral at bedtime  dextrose 5% + sodium chloride 0.45% with potassium chloride 20 mEq/L 1000 milliLiter(s) (75 mL/Hr) IV Continuous <Continuous>  enoxaparin Injectable 40 milliGRAM(s) SubCutaneous every 24 hours  levothyroxine 137 MICROGram(s) Oral daily  metroNIDAZOLE  IVPB 500 milliGRAM(s) IV Intermittent every 8 hours  montelukast 10 milliGRAM(s) Oral daily  pantoprazole  Injectable 40 milliGRAM(s) IV Push daily  tamsulosin 0.4 milliGRAM(s) Oral at bedtime    MEDICATIONS  (PRN):  acetaminophen     Tablet .. 650 milliGRAM(s) Oral every 6 hours PRN Temp greater or equal to 38C (100.4F), Mild Pain (1 - 3)  acetaminophen 300 mG/butalbital 50 mG/ caffeine 40 mG 1 Capsule(s) Oral every 8 hours PRN migraine  ALBUTerol    90 MICROgram(s) HFA Inhaler 1 Puff(s) Inhalation every 6 hours PRN Shortness of Breath and/or Wheezing  benzonatate 200 milliGRAM(s) Oral three times a day PRN Cough  HYDROmorphone  Injectable 0.25 milliGRAM(s) IV Push every 4 hours PRN Moderate Pain (4 - 6)  HYDROmorphone  Injectable 0.5 milliGRAM(s) IV Push every 4 hours PRN Severe Pain (7 - 10)  melatonin 3 milliGRAM(s) Oral at bedtime PRN Insomnia  metoclopramide Injectable 10 milliGRAM(s) IV Push every 6 hours PRN nausea/vomiting  ondansetron Injectable 4 milliGRAM(s) IV Push every 4 hours PRN Nausea and/or Vomiting  trimethobenzamide Injectable 200 milliGRAM(s) IntraMuscular three times a day PRN Nausea and/or Vomiting      Allergies    codeine (Anaphylaxis; Hives)  fish (Anaphylaxis)  latex (Anaphylaxis)  morphine (Nausea; Vomiting)  penicillin (Hives)    Intolerances        REVIEW OF SYSTEMS:  CONSTITUTIONAL: No fever or chills  HEENT:  No headache  RESPIRATORY: No shortness of breath  CARDIOVASCULAR: No chest pain  GASTROINTESTINAL: Admits to abd pain, flank pain, nausea, vomiting x1 since yesterday, and soft stools. No diarrhea  GENITOURINARY: No dysuria, frequency, or hematuria  NEUROLOGICAL: no dizziness  MUSCULOSKELETAL: no myalgias     Vital Signs Last 24 Hrs  T(C): 36.9 (16 May 2022 04:06), Max: 36.9 (16 May 2022 04:06)  T(F): 98.4 (16 May 2022 04:06), Max: 98.4 (16 May 2022 04:06)  HR: 60 (16 May 2022 04:06) (60 - 67)  BP: 114/74 (16 May 2022 04:06) (114/74 - 123/59)  BP(mean): --  RR: 17 (16 May 2022 04:06) (17 - 17)  SpO2: 94% (16 May 2022 04:06) (94% - 98%)    PHYSICAL EXAM:  GENERAL: NAD, laying in bed  HEENT:  anicteric, moist mucous membranes  CHEST/LUNG:  CTA b/l, no rales, wheezes, or rhonchi  HEART:  RRR, S1, S2  ABDOMEN: tenderness to palpation in LLQ and b/l flanks  EXTREMITIES: no edema, cyanosis, or calf tenderness  NERVOUS SYSTEM: answers questions and follows commands appropriately    LABS:                        10.8   8.88  )-----------( 198      ( 16 May 2022 08:01 )             31.5     CBC Full  -  ( 16 May 2022 08:01 )  WBC Count : 8.88 K/uL  Hemoglobin : 10.8 g/dL  Hematocrit : 31.5 %  Platelet Count - Automated : 198 K/uL  Mean Cell Volume : 89.7 fl  Mean Cell Hemoglobin : 30.8 pg  Mean Cell Hemoglobin Concentration : 34.3 gm/dL  Auto Neutrophil # : 5.78 K/uL  Auto Lymphocyte # : 2.33 K/uL  Auto Monocyte # : 0.66 K/uL  Auto Eosinophil # : 0.05 K/uL  Auto Basophil # : 0.03 K/uL  Auto Neutrophil % : 65.2 %  Auto Lymphocyte % : 26.2 %  Auto Monocyte % : 7.4 %  Auto Eosinophil % : 0.6 %  Auto Basophil % : 0.3 %      Ca    8.7        15 May 2022 08:21          CAPILLARY BLOOD GLUCOSE            Culture - Urine (collected 05-13-22 @ 12:33)  Source: Clean Catch Clean Catch (Midstream)  Final Report (05-14-22 @ 11:19):    <10,000 CFU/mL Normal Urogenital Katarina        RADIOLOGY & ADDITIONAL TESTS:    Personally reviewed.     Consultant(s) Notes Reviewed:  [x] YES  [ ] NO

## 2022-05-16 NOTE — PROGRESS NOTE ADULT - PROBLEM SELECTOR PLAN 4
Improving. Likely 2/2 IV Cipro x1 in the ED  - RUQ US: Prior cholecystectomy. Mildly prominent common bile duct likely normal for age and postcholecystectomy state however correlate with bilirubin levels. Increased echogenicity of the hepatic parenchyma suggesting hepatic steatosis.  - Bilirubin wnl  - Avoid hepatotoxic meds  - Trend LFTs  - Consider GI consult for worsening LFTs

## 2022-05-16 NOTE — DIETITIAN INITIAL EVALUATION ADULT - OTHER INFO
65 y/o female adm with diverticulitis. PMH obesity, anxiety, hypothyroidism, migraines, kidney calculi, fibromyalgia. Pt visited at bedside this am. Pt NPO. Tried clear liquids yesterday, however vomited. Pt states that MD may order again today. Zofran given for nausea. Pt's UBW was 145# and pt reports gaining 5# since COVID started. Current wt noted to be 167.1# ? due to IV fluids? accurate wt? Provided pt with verbal and written information re: low fiber diet x 2 weeks slowly advanced to high fiber while ensuring adequate fluid intake to prevent constipation.

## 2022-05-17 LAB
ALBUMIN SERPL ELPH-MCNC: 2.7 G/DL — LOW (ref 3.3–5)
ALP SERPL-CCNC: 78 U/L — SIGNIFICANT CHANGE UP (ref 40–120)
ALT FLD-CCNC: 96 U/L — HIGH (ref 12–78)
ANION GAP SERPL CALC-SCNC: 4 MMOL/L — LOW (ref 5–17)
AST SERPL-CCNC: 29 U/L — SIGNIFICANT CHANGE UP (ref 15–37)
BASOPHILS # BLD AUTO: 0.03 K/UL — SIGNIFICANT CHANGE UP (ref 0–0.2)
BASOPHILS NFR BLD AUTO: 0.4 % — SIGNIFICANT CHANGE UP (ref 0–2)
BILIRUB SERPL-MCNC: 0.2 MG/DL — SIGNIFICANT CHANGE UP (ref 0.2–1.2)
BUN SERPL-MCNC: 10 MG/DL — SIGNIFICANT CHANGE UP (ref 7–23)
CALCIUM SERPL-MCNC: 8.5 MG/DL — SIGNIFICANT CHANGE UP (ref 8.5–10.1)
CHLORIDE SERPL-SCNC: 114 MMOL/L — HIGH (ref 96–108)
CO2 SERPL-SCNC: 28 MMOL/L — SIGNIFICANT CHANGE UP (ref 22–31)
CREAT SERPL-MCNC: 0.5 MG/DL — SIGNIFICANT CHANGE UP (ref 0.5–1.3)
EGFR: 104 ML/MIN/1.73M2 — SIGNIFICANT CHANGE UP
EOSINOPHIL # BLD AUTO: 0.12 K/UL — SIGNIFICANT CHANGE UP (ref 0–0.5)
EOSINOPHIL NFR BLD AUTO: 1.5 % — SIGNIFICANT CHANGE UP (ref 0–6)
FERRITIN SERPL-MCNC: 72 NG/ML — SIGNIFICANT CHANGE UP (ref 15–150)
FOLATE SERPL-MCNC: 17.5 NG/ML — SIGNIFICANT CHANGE UP
GLUCOSE SERPL-MCNC: 88 MG/DL — SIGNIFICANT CHANGE UP (ref 70–99)
HCT VFR BLD CALC: 30.4 % — LOW (ref 34.5–45)
HGB BLD-MCNC: 10.4 G/DL — LOW (ref 11.5–15.5)
IMM GRANULOCYTES NFR BLD AUTO: 0.4 % — SIGNIFICANT CHANGE UP (ref 0–1.5)
IRON SATN MFR SERPL: 19 % — SIGNIFICANT CHANGE UP (ref 14–50)
IRON SATN MFR SERPL: 47 UG/DL — SIGNIFICANT CHANGE UP (ref 30–160)
LYMPHOCYTES # BLD AUTO: 2.24 K/UL — SIGNIFICANT CHANGE UP (ref 1–3.3)
LYMPHOCYTES # BLD AUTO: 27.8 % — SIGNIFICANT CHANGE UP (ref 13–44)
MCHC RBC-ENTMCNC: 30.9 PG — SIGNIFICANT CHANGE UP (ref 27–34)
MCHC RBC-ENTMCNC: 34.2 GM/DL — SIGNIFICANT CHANGE UP (ref 32–36)
MCV RBC AUTO: 90.2 FL — SIGNIFICANT CHANGE UP (ref 80–100)
MONOCYTES # BLD AUTO: 0.58 K/UL — SIGNIFICANT CHANGE UP (ref 0–0.9)
MONOCYTES NFR BLD AUTO: 7.2 % — SIGNIFICANT CHANGE UP (ref 2–14)
NEUTROPHILS # BLD AUTO: 5.06 K/UL — SIGNIFICANT CHANGE UP (ref 1.8–7.4)
NEUTROPHILS NFR BLD AUTO: 62.7 % — SIGNIFICANT CHANGE UP (ref 43–77)
NRBC # BLD: 0 /100 WBCS — SIGNIFICANT CHANGE UP (ref 0–0)
PLATELET # BLD AUTO: 186 K/UL — SIGNIFICANT CHANGE UP (ref 150–400)
POTASSIUM SERPL-MCNC: 3.3 MMOL/L — LOW (ref 3.5–5.3)
POTASSIUM SERPL-SCNC: 3.3 MMOL/L — LOW (ref 3.5–5.3)
PROT SERPL-MCNC: 6.1 G/DL — SIGNIFICANT CHANGE UP (ref 6–8.3)
RBC # BLD: 3.37 M/UL — LOW (ref 3.8–5.2)
RBC # FLD: 14.4 % — SIGNIFICANT CHANGE UP (ref 10.3–14.5)
SODIUM SERPL-SCNC: 146 MMOL/L — HIGH (ref 135–145)
TIBC SERPL-MCNC: 248 UG/DL — SIGNIFICANT CHANGE UP (ref 220–430)
UIBC SERPL-MCNC: 201 UG/DL — SIGNIFICANT CHANGE UP (ref 110–370)
VIT B12 SERPL-MCNC: 598 PG/ML — SIGNIFICANT CHANGE UP (ref 232–1245)
WBC # BLD: 8.06 K/UL — SIGNIFICANT CHANGE UP (ref 3.8–10.5)
WBC # FLD AUTO: 8.06 K/UL — SIGNIFICANT CHANGE UP (ref 3.8–10.5)

## 2022-05-17 PROCEDURE — 99233 SBSQ HOSP IP/OBS HIGH 50: CPT

## 2022-05-17 PROCEDURE — 99232 SBSQ HOSP IP/OBS MODERATE 35: CPT | Mod: GC

## 2022-05-17 RX ORDER — METRONIDAZOLE 500 MG
500 TABLET ORAL EVERY 8 HOURS
Refills: 0 | Status: DISCONTINUED | OUTPATIENT
Start: 2022-05-17 | End: 2022-05-18

## 2022-05-17 RX ORDER — CIPROFLOXACIN LACTATE 400MG/40ML
500 VIAL (ML) INTRAVENOUS EVERY 12 HOURS
Refills: 0 | Status: DISCONTINUED | OUTPATIENT
Start: 2022-05-18 | End: 2022-05-18

## 2022-05-17 RX ORDER — POTASSIUM CHLORIDE 20 MEQ
40 PACKET (EA) ORAL ONCE
Refills: 0 | Status: COMPLETED | OUTPATIENT
Start: 2022-05-17 | End: 2022-05-17

## 2022-05-17 RX ADMIN — Medication 100 MILLIGRAM(S): at 05:30

## 2022-05-17 RX ADMIN — Medication 200 MILLIGRAM(S): at 21:11

## 2022-05-17 RX ADMIN — ENOXAPARIN SODIUM 40 MILLIGRAM(S): 100 INJECTION SUBCUTANEOUS at 17:36

## 2022-05-17 RX ADMIN — Medication 40 MILLIEQUIVALENT(S): at 12:39

## 2022-05-17 RX ADMIN — Medication 650 MILLIGRAM(S): at 09:12

## 2022-05-17 RX ADMIN — HYDROMORPHONE HYDROCHLORIDE 0.5 MILLIGRAM(S): 2 INJECTION INTRAMUSCULAR; INTRAVENOUS; SUBCUTANEOUS at 02:40

## 2022-05-17 RX ADMIN — Medication 1 MILLIGRAM(S): at 21:10

## 2022-05-17 RX ADMIN — CEFTRIAXONE 100 MILLIGRAM(S): 500 INJECTION, POWDER, FOR SOLUTION INTRAMUSCULAR; INTRAVENOUS at 09:07

## 2022-05-17 RX ADMIN — MONTELUKAST 10 MILLIGRAM(S): 4 TABLET, CHEWABLE ORAL at 21:11

## 2022-05-17 RX ADMIN — HYDROMORPHONE HYDROCHLORIDE 0.5 MILLIGRAM(S): 2 INJECTION INTRAMUSCULAR; INTRAVENOUS; SUBCUTANEOUS at 02:21

## 2022-05-17 RX ADMIN — Medication 137 MICROGRAM(S): at 05:29

## 2022-05-17 RX ADMIN — PANTOPRAZOLE SODIUM 40 MILLIGRAM(S): 20 TABLET, DELAYED RELEASE ORAL at 12:38

## 2022-05-17 RX ADMIN — Medication 500 MILLIGRAM(S): at 21:10

## 2022-05-17 RX ADMIN — Medication 650 MILLIGRAM(S): at 10:12

## 2022-05-17 RX ADMIN — Medication 100 MILLIGRAM(S): at 14:53

## 2022-05-17 NOTE — PROGRESS NOTE ADULT - SUBJECTIVE AND OBJECTIVE BOX
[facility]  INFECTIOUS DISEASES   [address] [place] [address]  Tel: [phone]     Fax: [phone]  ======================================================  MD Sharda Lovelace Kaushal, MD Cho, Michelle, MD   ======================================================    Assessment/ Recommendation:    66-year-old female with history of anal fissure, lithotripsy for renal stones, anxiety, hypothyroidism, asthma presented with left lower quadrant abdominal pain with flank pain associated with nausea vomiting being treated for  acute uncomplicated sigmoid diverticulitis with incidental hepatic steatosis and right ureteral calculus and history of multiple allergies including 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 with hives     urine culture: May 13: Negative   hepatitis C antibody: Negative   COVID PCR: Negative     normalized WBC, afebrile,  improved symptoms of nausea and abdominal pain   advancement of diet as per primary team, currently on clear liq diet     continue with Rocephin 1 g daily and Flagyl 500 mg every 8 hours, while in hospital and when ready for discharge, can transition to Ciprofloxacin 500 mg BID and Flagyl 500 mg TID (with no co-administration of medication of other medications especially divalent cations, if needed to be  by 3-4 hours) with last date of antibiotics 5.19.22    Daily CBC with differential, renal function with electrolytes while inpatient and on antibiotics ;  improving ALT   eventual colonoscopy as an outpatient   low to no yield of getting blood culture unless clinical exam  change for worse, hemodynamically unstable, persistently new fever    Plan explained to patient   D/w Primary team and consultants     ________________________________    Last 24 hours:   Tolerated clear liq for now, wants to be discharged if possible    denied any fever or chills    complaints of abdominal pain improved   soft bowel movement but denied any watery diarrhea or hematochezia  MEDICATIONS  (STANDING):  budesonide 160 MICROgram(s)/formoterol 4.5 MICROgram(s) Inhaler 2 Puff(s) Inhalation two times a day  cefTRIAXone   IVPB      cefTRIAXone   IVPB 1000 milliGRAM(s) IV Intermittent every 24 hours  clonazePAM  Tablet 1 milliGRAM(s) Oral at bedtime  enoxaparin Injectable 40 milliGRAM(s) SubCutaneous every 24 hours  levothyroxine 137 MICROGram(s) Oral daily  metroNIDAZOLE  IVPB 500 milliGRAM(s) IV Intermittent every 8 hours  montelukast 10 milliGRAM(s) Oral daily  pantoprazole  Injectable 40 milliGRAM(s) IV Push daily  potassium chloride   Powder 40 milliEquivalent(s) Oral once  tamsulosin 0.4 milliGRAM(s) Oral at bedtime    MEDICATIONS  (PRN):  acetaminophen     Tablet .. 650 milliGRAM(s) Oral every 6 hours PRN Temp greater or equal to 38C (100.4F), Mild Pain (1 - 3)  acetaminophen 300 mG/butalbital 50 mG/ caffeine 40 mG 1 Capsule(s) Oral every 8 hours PRN migraine  ALBUTerol    90 MICROgram(s) HFA Inhaler 1 Puff(s) Inhalation every 6 hours PRN Shortness of Breath and/or Wheezing  benzonatate 200 milliGRAM(s) Oral three times a day PRN Cough  HYDROmorphone  Injectable 0.25 milliGRAM(s) IV Push every 4 hours PRN Moderate Pain (4 - 6)  HYDROmorphone  Injectable 0.5 milliGRAM(s) IV Push every 4 hours PRN Severe Pain (7 - 10)  melatonin 3 milliGRAM(s) Oral at bedtime PRN Insomnia  metoclopramide Injectable 10 milliGRAM(s) IV Push every 6 hours PRN nausea/vomiting  ondansetron Injectable 4 milliGRAM(s) IV Push every 4 hours PRN Nausea and/or Vomiting  trimethobenzamide Injectable 200 milliGRAM(s) IntraMuscular three times a day PRN Nausea and/or Vomiting   denied any hematuria dysuria or flank tenderness    Further ROS:  All systems reviewed with symptoms mentions as above.     ______________________________  Allergies    codeine (Anaphylaxis; Hives)  fish (Anaphylaxis)  latex (Anaphylaxis)  morphine (Nausea; Vomiting)  penicillin (Hives)        _________________    PHYSICAL EXAM:    Objective:  ICU Vital Signs Last 24 Hrs  T(C): 36.9 (17 May 2022 05:14), Max: 36.9 (17 May 2022 05:14)  T(F): 98.5 (17 May 2022 05:14), Max: 98.5 (17 May 2022 05:14)  HR: 63 (17 May 2022 05:14) (62 - 63)  BP: 119/61 (17 May 2022 05:14) (115/55 - 127/64)  BP(mean): --  ABP: --  ABP(mean): --  RR: 18 (17 May 2022 05:14) (17 - 18)  SpO2: 95% (17 May 2022 05:14) (95% - 98%)    General: No acute distress.  Sitting in chair comfortably   Neuro: AAO*3, No motor, sensory, or cranial nerve deficit  HEENT: Pupils equal, reactive to light, Oral mucosa moist  PULM: Clear to auscultation bilaterally, no significant adventitious breath sounds   CVS: Regular rhythm and controlled rate, no murmurs, rubs, or gallops  ABD: left lower quadrant tenderness improved since yesterday with normal bowel sounds no rebound with no CVA tenderness or suprapubic tenderness  EXT: No b/l LE edema, nontender with pedal pulse palpable   SKIN: Warm and well perfused, no acute rashes   ______________  LABS, MICROBIOLOGY, RADIOLOGY & ADDITIONAL STUDIES: Reviewed

## 2022-05-17 NOTE — PHARMACOTHERAPY INTERVENTION NOTE - COMMENTS
Patient is 66y F presenting with intra-abdominal infection and is being treated with ceftriaxone 1000 mg IV daily and metronidazole 500 mg IV Q8. Discussed with ID Dr. Kendall Robin, patient tolerating oral diet and medications. Recommend switch to ciprofloxacin 500 mg PO BID and metronidazole 500 mg PO Q8 to start tomorrow. Accepted and orders entered.

## 2022-05-17 NOTE — PROGRESS NOTE ADULT - PROBLEM SELECTOR PLAN 2
Likely anemia of inflammation in the setting of infection   - Hgb 12.9 on admission  - Currently hemodynamically stable, no signs or symptoms of active bleeding  - iron, folate, ferritin, TIBC, B12 wnl  - F/u FOBT  - Monitor CBC

## 2022-05-17 NOTE — PROGRESS NOTE ADULT - PROVIDER SPECIALTY LIST ADULT
Infectious Disease
Hospitalist

## 2022-05-17 NOTE — PROGRESS NOTE ADULT - SUBJECTIVE AND OBJECTIVE BOX
Patient is a 66y old  Female who presents with a chief complaint of Diverticulitis (17 May 2022 10:36)      INTERVAL HPI/OVERNIGHT EVENTS: Patient was itchy overnight requiring Benadryl x1. Admits to soft stools and nausea, no vomiting. Also admits to b/l lower abd pain. Denies fever/chills, chest pain, shortness of breath, headache, dizziness. Patient tolerating clear liquid diet, diet advanced to full liquids today.     MEDICATIONS  (STANDING):  budesonide 160 MICROgram(s)/formoterol 4.5 MICROgram(s) Inhaler 2 Puff(s) Inhalation two times a day  cefTRIAXone   IVPB      cefTRIAXone   IVPB 1000 milliGRAM(s) IV Intermittent every 24 hours  clonazePAM  Tablet 1 milliGRAM(s) Oral at bedtime  enoxaparin Injectable 40 milliGRAM(s) SubCutaneous every 24 hours  levothyroxine 137 MICROGram(s) Oral daily  metroNIDAZOLE  IVPB 500 milliGRAM(s) IV Intermittent every 8 hours  montelukast 10 milliGRAM(s) Oral daily  pantoprazole  Injectable 40 milliGRAM(s) IV Push daily  potassium chloride   Powder 40 milliEquivalent(s) Oral once  tamsulosin 0.4 milliGRAM(s) Oral at bedtime    MEDICATIONS  (PRN):  acetaminophen     Tablet .. 650 milliGRAM(s) Oral every 6 hours PRN Temp greater or equal to 38C (100.4F), Mild Pain (1 - 3)  acetaminophen 300 mG/butalbital 50 mG/ caffeine 40 mG 1 Capsule(s) Oral every 8 hours PRN migraine  ALBUTerol    90 MICROgram(s) HFA Inhaler 1 Puff(s) Inhalation every 6 hours PRN Shortness of Breath and/or Wheezing  benzonatate 200 milliGRAM(s) Oral three times a day PRN Cough  HYDROmorphone  Injectable 0.25 milliGRAM(s) IV Push every 4 hours PRN Moderate Pain (4 - 6)  HYDROmorphone  Injectable 0.5 milliGRAM(s) IV Push every 4 hours PRN Severe Pain (7 - 10)  melatonin 3 milliGRAM(s) Oral at bedtime PRN Insomnia  metoclopramide Injectable 10 milliGRAM(s) IV Push every 6 hours PRN nausea/vomiting  ondansetron Injectable 4 milliGRAM(s) IV Push every 4 hours PRN Nausea and/or Vomiting  trimethobenzamide Injectable 200 milliGRAM(s) IntraMuscular three times a day PRN Nausea and/or Vomiting      Allergies    codeine (Anaphylaxis; Hives)  fish (Anaphylaxis)  latex (Anaphylaxis)  morphine (Nausea; Vomiting)  penicillin (Hives)    Intolerances        REVIEW OF SYSTEMS:  CONSTITUTIONAL: No fever or chills  HEENT: Mild headache  RESPIRATORY: No shortness of breath  CARDIOVASCULAR: No chest pain  GASTROINTESTINAL: Admits abd pain, nausea. Denies vomiting or diarrhea  GENITOURINARY: No dysuria, frequency  NEUROLOGICAL: no dizziness  MUSCULOSKELETAL: no myalgias     Vital Signs Last 24 Hrs  T(C): 36.9 (17 May 2022 05:14), Max: 36.9 (17 May 2022 05:14)  T(F): 98.5 (17 May 2022 05:14), Max: 98.5 (17 May 2022 05:14)  HR: 63 (17 May 2022 05:14) (62 - 63)  BP: 119/61 (17 May 2022 05:14) (115/55 - 127/64)  BP(mean): --  RR: 18 (17 May 2022 05:14) (17 - 18)  SpO2: 95% (17 May 2022 05:14) (95% - 98%)    PHYSICAL EXAM:  GENERAL: NAD  HEENT:  anicteric, moist mucous membranes  CHEST/LUNG:  CTA b/l, no rales, wheezes, or rhonchi  HEART:  RRR, S1, S2  ABDOMEN:  tenderness to palpation in b/l lower abd quadrants and b/l flanks  EXTREMITIES: no edema, cyanosis, or calf tenderness  NERVOUS SYSTEM: answers questions and follows commands appropriately    LABS:                        10.4   8.06  )-----------( 186      ( 17 May 2022 07:00 )             30.4     CBC Full  -  ( 17 May 2022 07:00 )  WBC Count : 8.06 K/uL  Hemoglobin : 10.4 g/dL  Hematocrit : 30.4 %  Platelet Count - Automated : 186 K/uL  Mean Cell Volume : 90.2 fl  Mean Cell Hemoglobin : 30.9 pg  Mean Cell Hemoglobin Concentration : 34.2 gm/dL  Auto Neutrophil # : 5.06 K/uL  Auto Lymphocyte # : 2.24 K/uL  Auto Monocyte # : 0.58 K/uL  Auto Eosinophil # : 0.12 K/uL  Auto Basophil # : 0.03 K/uL  Auto Neutrophil % : 62.7 %  Auto Lymphocyte % : 27.8 %  Auto Monocyte % : 7.2 %  Auto Eosinophil % : 1.5 %  Auto Basophil % : 0.4 %    17 May 2022 07:00    146    |  114    |  10     ----------------------------<  88     3.3     |  28     |  0.50     Ca    8.5        17 May 2022 07:00    TPro  6.1    /  Alb  2.7    /  TBili  0.2    /  DBili  x      /  AST  29     /  ALT  96     /  AlkPhos  78     17 May 2022 07:00        CAPILLARY BLOOD GLUCOSE            Culture - Urine (collected 05-13-22 @ 12:33)  Source: Clean Catch Clean Catch (Midstream)  Final Report (05-14-22 @ 11:19):    <10,000 CFU/mL Normal Urogenital Katarina        RADIOLOGY & ADDITIONAL TESTS:    Personally reviewed.     Consultant(s) Notes Reviewed:  [x] YES  [ ] NO

## 2022-05-17 NOTE — PROGRESS NOTE ADULT - PROBLEM SELECTOR PLAN 1
- Pt presented with 1 day of acute LQ abd pain, n/v, inability to tolerate PO, symptoms improving  - CT scan showing acute uncomplicated sigmoid diverticulitis, mild leukocytosis, pt does not meet sepsis criteria  - Given IV Cipro/Flagyl in ED  - Continue IV CTX and IV Flagyl  - Patient tolerated clear liquid diet, diet advanced to full liquid diet this AM. Will attempt to advance to regular diet tomorrow  - Pain control with Tylenol 650 mg for mild, Dilaudid 0.25 mg q4h for moderate, and 0.5 mg q4h for severe   - Zofran prn for nausea  - Monitor abd exams, WBC count, fever curve  - ID (Dr. Robin) consulted, recs appreciated

## 2022-05-17 NOTE — PROGRESS NOTE ADULT - TIME BILLING
Please call us with any concerns or questions.   We will continue to follow.     Kendall Robin MD   Division of Infectious Diseases  [facility]   Cell [phone] between 8am and 6pm   After 6pm and weekends please call ID service at [phone].
Plan d/w patient and primary team   We will sign off for now.   Please call us with any concerns or questions.     Kendall Robin MD   Division of Infectious Diseases  [facility]   Cell [phone] between 8am and 6pm   After 6pm and weekends please call ID service at [phone].

## 2022-05-17 NOTE — PROGRESS NOTE ADULT - ASSESSMENT
67 y/o F with a PMHx of anxiety, hypothyroidism, asthma, kidney stones (s/p lithotripsy on 5/4) admitted for acute sigmoid diverticulitis with concurrent b/l non-obstructing renal stones and R ureteral stone, on IV abx.

## 2022-05-17 NOTE — PROGRESS NOTE ADULT - PROBLEM SELECTOR PLAN 3
Pt with hx of recurrent kidney stones, follows with Dr. Moreira outpatient  - Recent lithotripsy for L-sided stone on 5/4, scheduled for R-sided lithotripsy on 5/23  - Manage conservatively with IVF, pain/nausea control as above  - Continue home Flomax 0.4 mg at bedtime  - Pt had negative UA, but urinary sx on admission. Urinary sx resolved.  - Strain urine  - Urology (Dr. Moreira) consulted, f/u recs

## 2022-05-18 ENCOUNTER — TRANSCRIPTION ENCOUNTER (OUTPATIENT)
Age: 67
End: 2022-05-18

## 2022-05-18 VITALS
OXYGEN SATURATION: 96 % | RESPIRATION RATE: 17 BRPM | HEART RATE: 72 BPM | TEMPERATURE: 98 F | WEIGHT: 167.11 LBS | DIASTOLIC BLOOD PRESSURE: 78 MMHG | SYSTOLIC BLOOD PRESSURE: 130 MMHG

## 2022-05-18 LAB
ALBUMIN SERPL ELPH-MCNC: 3.1 G/DL — LOW (ref 3.3–5)
ALP SERPL-CCNC: 87 U/L — SIGNIFICANT CHANGE UP (ref 40–120)
ALT FLD-CCNC: 90 U/L — HIGH (ref 12–78)
ANION GAP SERPL CALC-SCNC: 7 MMOL/L — SIGNIFICANT CHANGE UP (ref 5–17)
AST SERPL-CCNC: 30 U/L — SIGNIFICANT CHANGE UP (ref 15–37)
BASOPHILS # BLD AUTO: 0.03 K/UL — SIGNIFICANT CHANGE UP (ref 0–0.2)
BASOPHILS NFR BLD AUTO: 0.3 % — SIGNIFICANT CHANGE UP (ref 0–2)
BILIRUB SERPL-MCNC: 0.3 MG/DL — SIGNIFICANT CHANGE UP (ref 0.2–1.2)
BUN SERPL-MCNC: 9 MG/DL — SIGNIFICANT CHANGE UP (ref 7–23)
CALCIUM SERPL-MCNC: 9 MG/DL — SIGNIFICANT CHANGE UP (ref 8.5–10.1)
CHLORIDE SERPL-SCNC: 107 MMOL/L — SIGNIFICANT CHANGE UP (ref 96–108)
CO2 SERPL-SCNC: 28 MMOL/L — SIGNIFICANT CHANGE UP (ref 22–31)
CREAT SERPL-MCNC: 0.52 MG/DL — SIGNIFICANT CHANGE UP (ref 0.5–1.3)
EGFR: 103 ML/MIN/1.73M2 — SIGNIFICANT CHANGE UP
EOSINOPHIL # BLD AUTO: 0.08 K/UL — SIGNIFICANT CHANGE UP (ref 0–0.5)
EOSINOPHIL NFR BLD AUTO: 0.8 % — SIGNIFICANT CHANGE UP (ref 0–6)
GLUCOSE SERPL-MCNC: 109 MG/DL — HIGH (ref 70–99)
HCT VFR BLD CALC: 35.9 % — SIGNIFICANT CHANGE UP (ref 34.5–45)
HGB BLD-MCNC: 12 G/DL — SIGNIFICANT CHANGE UP (ref 11.5–15.5)
IMM GRANULOCYTES NFR BLD AUTO: 0.6 % — SIGNIFICANT CHANGE UP (ref 0–1.5)
LYMPHOCYTES # BLD AUTO: 1.57 K/UL — SIGNIFICANT CHANGE UP (ref 1–3.3)
LYMPHOCYTES # BLD AUTO: 16.4 % — SIGNIFICANT CHANGE UP (ref 13–44)
MCHC RBC-ENTMCNC: 30.4 PG — SIGNIFICANT CHANGE UP (ref 27–34)
MCHC RBC-ENTMCNC: 33.4 GM/DL — SIGNIFICANT CHANGE UP (ref 32–36)
MCV RBC AUTO: 90.9 FL — SIGNIFICANT CHANGE UP (ref 80–100)
MONOCYTES # BLD AUTO: 0.45 K/UL — SIGNIFICANT CHANGE UP (ref 0–0.9)
MONOCYTES NFR BLD AUTO: 4.7 % — SIGNIFICANT CHANGE UP (ref 2–14)
NEUTROPHILS # BLD AUTO: 7.38 K/UL — SIGNIFICANT CHANGE UP (ref 1.8–7.4)
NEUTROPHILS NFR BLD AUTO: 77.2 % — HIGH (ref 43–77)
NRBC # BLD: 0 /100 WBCS — SIGNIFICANT CHANGE UP (ref 0–0)
PLATELET # BLD AUTO: 239 K/UL — SIGNIFICANT CHANGE UP (ref 150–400)
POTASSIUM SERPL-MCNC: 3.4 MMOL/L — LOW (ref 3.5–5.3)
POTASSIUM SERPL-SCNC: 3.4 MMOL/L — LOW (ref 3.5–5.3)
PROT SERPL-MCNC: 7.1 G/DL — SIGNIFICANT CHANGE UP (ref 6–8.3)
RBC # BLD: 3.95 M/UL — SIGNIFICANT CHANGE UP (ref 3.8–5.2)
RBC # FLD: 13.7 % — SIGNIFICANT CHANGE UP (ref 10.3–14.5)
SODIUM SERPL-SCNC: 142 MMOL/L — SIGNIFICANT CHANGE UP (ref 135–145)
WBC # BLD: 9.57 K/UL — SIGNIFICANT CHANGE UP (ref 3.8–10.5)
WBC # FLD AUTO: 9.57 K/UL — SIGNIFICANT CHANGE UP (ref 3.8–10.5)

## 2022-05-18 PROCEDURE — 82746 ASSAY OF FOLIC ACID SERUM: CPT

## 2022-05-18 PROCEDURE — 96376 TX/PRO/DX INJ SAME DRUG ADON: CPT

## 2022-05-18 PROCEDURE — 94640 AIRWAY INHALATION TREATMENT: CPT

## 2022-05-18 PROCEDURE — 36415 COLL VENOUS BLD VENIPUNCTURE: CPT

## 2022-05-18 PROCEDURE — 80053 COMPREHEN METABOLIC PANEL: CPT

## 2022-05-18 PROCEDURE — 74176 CT ABD & PELVIS W/O CONTRAST: CPT | Mod: MA

## 2022-05-18 PROCEDURE — 85610 PROTHROMBIN TIME: CPT

## 2022-05-18 PROCEDURE — 83036 HEMOGLOBIN GLYCOSYLATED A1C: CPT

## 2022-05-18 PROCEDURE — 76705 ECHO EXAM OF ABDOMEN: CPT

## 2022-05-18 PROCEDURE — 86803 HEPATITIS C AB TEST: CPT

## 2022-05-18 PROCEDURE — 96367 TX/PROPH/DG ADDL SEQ IV INF: CPT

## 2022-05-18 PROCEDURE — 85730 THROMBOPLASTIN TIME PARTIAL: CPT

## 2022-05-18 PROCEDURE — 83550 IRON BINDING TEST: CPT

## 2022-05-18 PROCEDURE — 85025 COMPLETE CBC W/AUTO DIFF WBC: CPT

## 2022-05-18 PROCEDURE — 99239 HOSP IP/OBS DSCHRG MGMT >30: CPT | Mod: GC

## 2022-05-18 PROCEDURE — U0003: CPT

## 2022-05-18 PROCEDURE — 83540 ASSAY OF IRON: CPT

## 2022-05-18 PROCEDURE — 96375 TX/PRO/DX INJ NEW DRUG ADDON: CPT

## 2022-05-18 PROCEDURE — 99285 EMERGENCY DEPT VISIT HI MDM: CPT | Mod: 25

## 2022-05-18 PROCEDURE — 81001 URINALYSIS AUTO W/SCOPE: CPT

## 2022-05-18 PROCEDURE — 96365 THER/PROPH/DIAG IV INF INIT: CPT

## 2022-05-18 PROCEDURE — 82607 VITAMIN B-12: CPT

## 2022-05-18 PROCEDURE — 87086 URINE CULTURE/COLONY COUNT: CPT

## 2022-05-18 PROCEDURE — U0005: CPT

## 2022-05-18 PROCEDURE — 82728 ASSAY OF FERRITIN: CPT

## 2022-05-18 RX ORDER — POTASSIUM CHLORIDE 20 MEQ
1 PACKET (EA) ORAL
Qty: 2 | Refills: 0
Start: 2022-05-18 | End: 2022-05-19

## 2022-05-18 RX ORDER — METRONIDAZOLE 500 MG
1 TABLET ORAL
Qty: 5 | Refills: 0
Start: 2022-05-18

## 2022-05-18 RX ORDER — CIPROFLOXACIN LACTATE 400MG/40ML
1 VIAL (ML) INTRAVENOUS
Qty: 3 | Refills: 0
Start: 2022-05-18

## 2022-05-18 RX ORDER — POTASSIUM CHLORIDE 20 MEQ
20 PACKET (EA) ORAL ONCE
Refills: 0 | Status: COMPLETED | OUTPATIENT
Start: 2022-05-18 | End: 2022-05-18

## 2022-05-18 RX ADMIN — Medication 20 MILLIEQUIVALENT(S): at 12:54

## 2022-05-18 RX ADMIN — Medication 500 MILLIGRAM(S): at 08:33

## 2022-05-18 RX ADMIN — Medication 137 MICROGRAM(S): at 05:29

## 2022-05-18 RX ADMIN — Medication 500 MILLIGRAM(S): at 08:34

## 2022-05-18 NOTE — CHART NOTE - NSCHARTNOTEFT_GEN_A_CORE
Called by RN for gas pain and nausea     Patient seen and examined at bedside. Patient laying in bed and appears to be in slight distress. She states that she has been feeling nauseous and has had multiple episodes of vomiting. She attributes her nausea to the Cipro being given. Patient denies any abdominal pain, CP, or SOB at this time.     VITALS:   T(C): 36.4 (05-13-22 @ 17:30), Max: 36.4 (05-13-22 @ 17:30)  HR: 64 (05-13-22 @ 17:30) (64 - 84)  BP: 114/68 (05-13-22 @ 17:30) (114/68 - 122/71)  RR: 16 (05-13-22 @ 17:30) (16 - 18)  SpO2: 98% (05-13-22 @ 17:30) (98% - 98%)    GENERAL: lying in bed, slight distress   HEAD:  Atraumatic, Normocephalic  EYES: EOMI, PERRLA, conjunctiva and sclera clear  ENT: Moist mucous membranes  NECK: Supple, No JVD  CHEST/LUNG: Clear to auscultation bilaterally; No rales, rhonchi, wheezing, or rubs. Unlabored respirations  HEART: Regular rate and rhythm; No murmurs, rubs, or gallops  ABDOMEN: BSx4; Soft, nontender, nondistended  EXTREMITIES:  2+ Peripheral Pulses, brisk capillary refill. No clubbing, cyanosis, or edema  NERVOUS SYSTEM:  A&Ox3, no focal deficits   SKIN: No rashes or lesions    65 y/o F with a PMHx of anxiety, hypothyroidism, asthma, kidney stones (s/p lithotripsy on 5/4), who presents with 1 day of lower quadrant abd pain and associated flank pain, found to have acute uncomplicated sigmoid diverticulitis on CT scan, with concurrent b/l non-obstructing renal stones and R ureteral stone, admit for IV fluids and abx.  - Unable to switch Abx at this time given allergy to penicillin (hives) as patient attributes nausea to Cipro   - S/P Simethicone, Tums and pepcid   - Will administer dose of Reglan   - Will call ID (Dr. Lizarraga) for further recommendations; F/U Recs
Called by RN for pt with nausea. Pt seen and examined at bedside. Reports ~10 episode of vomiting, NBNB. States her abd pain is well controlled, improving. Pt received 2 x doses of zofran 4mg and 1 x dose Tigan IM since day shift. Reports meds given did not improve nausea.     T(C): 36.7 (05-14-22 @ 20:33), Max: 36.8 (05-14-22 @ 15:37)  HR: 77 (05-14-22 @ 20:33) (73 - 92)  BP: 119/62 (05-14-22 @ 20:33) (114/67 - 138/61)  RR: 16 (05-14-22 @ 20:33) (16 - 17)  SpO2: 98% (05-14-22 @ 20:33) (97% - 100%)  Wt(kg): --    Physical :  Gen- mild distress, with emesis bag in hand  Cardio - s+1,s+2, rrr, no murmur  Lung - cta b/l, no wheeze, no rhonchi, no rales   Abdomen- soft, nontender, nondistended at this time. BS appreciated.  Ext- no edema, 2+ pulses b/l  Neuro- answers questions, follows commands appropriately, grossly moves all 4 ext.      Assessment/Plan  65yo F admitted for acute sigmoid diverticulitis with concurrent b/l non-obstructing renal stones and R ureteral stone. Called by RN for nausea    - IV reglan 10mg x 1 dose   - Will continue to monitor  - RN to notify of any changes
Called by RN for pt with c/o of itchiness. Patient seen at bedside. No rash appreciated, but pt continues to complain of itching of arms. Benadryl x1 ordered.
Spoke to patient about liver lesion noted on CT scan. She prefers to be discharged today and follow up with GI as out patient. Will get copy of her CT to take with her. GI Dr. Wallace's group information given to patient and also recommended to talk to her PCP if she wants to see any other GI specialist

## 2022-05-18 NOTE — DISCHARGE NOTE NURSING/CASE MANAGEMENT/SOCIAL WORK - PATIENT PORTAL LINK FT
You can access the FollowMyHealth Patient Portal offered by [facility] by registering at the following website: [email or web address]. By joining [email or web address] FollowMyHealth portal, you will also be able to view your health information using other applications (apps) compatible with our system.

## 2022-05-18 NOTE — DISCHARGE NOTE NURSING/CASE MANAGEMENT/SOCIAL WORK - NSDCPEFALRISK_GEN_ALL_CORE
For information on Fall & Injury Prevention, visit: [email or web address] OR  [email or web address] OR  https://www.cdc.gov/steadi/patient.html

## 2022-05-18 NOTE — CONSULT NOTE ADULT - ASSESSMENT
65 y/o F with a PMHx of anxiety, hypothyroidism, asthma, kidney stones (s/p LK ESWL on 5/4/22), who presents with 1 day of lower quadrant abd pain and R flank pain. Pain associated with nausea and over 10 episodes of NBNB vomiting, and inability to tolerate PO.  CT A/P showing acute uncomplicated sigmoid diverticulitis, 4 mm mid-distal R ureteral calculus, b/l 3 mm non-obstructing renal calculi, no hydronephrosis. Since admission sx's improving; pt afeb. Ucx neg.      Expectant management - fluids, strain urine, analgesics prn - of R distal ureteral 4mm calculus.  If unable to pass, will sched outpt ESWL.
There is a 66-year-old woman status post recent lithotripsy, with evidence of diverticulitis on CT scan here accompanied by a right ureteral calculus.  However the pain is left sided.  Her abdominal exam at the present time is completely benign, but she did have severe pain previously.  It is hard to tease out whether this was typical renal colic pain or not.  The etiology of her liver function test elevations is not clear.  They will need to be trended.  She is status post cholecystectomy, there is no other obvious abnormality on the CT scan.  Total bilirubin is not elevated to implicate cholangitis, and again the pain is on the left side, not the right side.    The patient is not penicillin allergic.  She tolerates amoxicillin without issues.    Suggestions  Stop ciprofloxacin, though not entirely convinced its causing her nausea  Gentamicin 5 mg/kg loading dose, will provide some coverage of the relatively unlikely event that she would have resistant [place] in her urinary tract in the absence of significant antibiotic exposure recently  Ceftriaxone 1 g daily  Serial abdominal examinations  Trend liver function tests  Follow-up culture data  Antiemetics  Analgesics  N.p.o.    Thank you for the courtesy of this referral.    Damián Piña MD  Attending Physician  [facility]  Division of Infectious Diseases  [phone]

## 2022-05-18 NOTE — CONSULT NOTE ADULT - SUBJECTIVE AND OBJECTIVE BOX
CHIEF COMPLAINT:    HPI:  65 y/o F with a PMHx of anxiety, hypothyroidism, asthma, kidney stones (s/p lithotripsy on 22), who presents with 1 day of lower quadrant abd pain and associated flank pain. Pain associated with nausea and over 10 episodes of NBNB vomiting, and inability to tolerate PO. Pt has chills, but denies any fevers, chest pain, palpitations, SOB, URI sx, change in bowel habits, hematochezia, or LE pain/swelling. Pt does have some dysuria, which is typical for when she gets kidney stones. Pt was recently on nitrofurantoin prior to lithotripsy. Pain is currently 8/10. Her last colonoscopy was 7 years ago, and she has never had diverticulosis/diverticulitis before.    CT A/P showing acute uncomplicated sigmoid diverticulitis, 4 mm mid-distal R ureteral calculus, b/l 3 mm non-obstructing renal calculi, no hydronephrosisSince admission sx's improving; pt afeb. Ucx neg.      PAST MEDICAL & SURGICAL HISTORY:  Hypothyroidism      Migraines      Anxiety      Kidney Calculi      Obesity      Fibromyalgia      Kidney Calculus- Lithotripsy x 3      S/P Hysterectomy       Delivery      S/P Total Thyroidectomy      History of Appendectomy      Asthma      S/P Laparoscopic Cholecystectomy      History of lumbar spinal fusion          REVIEW OF SYSTEMS:    CONSTITUTIONAL: No weakness, fevers or chills  EYES/ENT: No visual changes;  No vertigo or throat pain   NECK: No pain or stiffness  RESPIRATORY: No cough, wheezing, hemoptysis; No shortness of breath  CARDIOVASCULAR: No chest pain or palpitations  GASTROINTESTINAL: per hpi  GENITOURINARY: No frequency or hematuria  NEUROLOGICAL: No numbness or weakness  SKIN: No itching, burning, rashes, or lesions   All other review of systems is negative unless indicated above.    MEDICATIONS  (STANDING):  budesonide 160 MICROgram(s)/formoterol 4.5 MICROgram(s) Inhaler 2 Puff(s) Inhalation two times a day  ciprofloxacin     Tablet 500 milliGRAM(s) Oral every 12 hours  clonazePAM  Tablet 1 milliGRAM(s) Oral at bedtime  enoxaparin Injectable 40 milliGRAM(s) SubCutaneous every 24 hours  levothyroxine 137 MICROGram(s) Oral daily  metroNIDAZOLE    Tablet 500 milliGRAM(s) Oral every 8 hours  montelukast 10 milliGRAM(s) Oral daily  pantoprazole  Injectable 40 milliGRAM(s) IV Push daily  tamsulosin 0.4 milliGRAM(s) Oral at bedtime    MEDICATIONS  (PRN):  acetaminophen     Tablet .. 650 milliGRAM(s) Oral every 6 hours PRN Temp greater or equal to 38C (100.4F), Mild Pain (1 - 3)  acetaminophen 300 mG/butalbital 50 mG/ caffeine 40 mG 1 Capsule(s) Oral every 8 hours PRN migraine  ALBUTerol    90 MICROgram(s) HFA Inhaler 1 Puff(s) Inhalation every 6 hours PRN Shortness of Breath and/or Wheezing  benzonatate 200 milliGRAM(s) Oral three times a day PRN Cough  HYDROmorphone  Injectable 0.25 milliGRAM(s) IV Push every 4 hours PRN Moderate Pain (4 - 6)  HYDROmorphone  Injectable 0.5 milliGRAM(s) IV Push every 4 hours PRN Severe Pain (7 - 10)  melatonin 3 milliGRAM(s) Oral at bedtime PRN Insomnia  metoclopramide Injectable 10 milliGRAM(s) IV Push every 6 hours PRN nausea/vomiting  ondansetron Injectable 4 milliGRAM(s) IV Push every 4 hours PRN Nausea and/or Vomiting  trimethobenzamide Injectable 200 milliGRAM(s) IntraMuscular three times a day PRN Nausea and/or Vomiting      Allergies    codeine (Anaphylaxis; Hives)  fish (Anaphylaxis)  latex (Anaphylaxis)  morphine (Nausea; Vomiting)  penicillin (Hives)    Intolerances        Social History:  Alcohol: Denied  Smoking: Nonsmoker  Drug Use: Denied  Marital Status:     FAMILY HISTORY:      Vital Signs Last 24 Hrs  T(C): 36.9 (18 May 2022 04:45), Max: 36.9 (18 May 2022 04:45)  T(F): 98.5 (18 May 2022 04:45), Max: 98.5 (18 May 2022 04:45)  HR: 72 (18 May 2022 04:45) (56 - 72)  BP: 130/78 (18 May 2022 04:45) (130/78 - 152/85)  BP(mean): --  RR: 17 (18 May 2022 04:45) (16 - 18)  SpO2: 96% (18 May 2022 04:45) (95% - 99%)    PHYSICAL EXAM:    Constitutional: NAD, well-developed  HEENT: LEON, EOMI, Normal Hearing, MMM  Neck: No LAD, No JVD  Back: Normal spine flexure, No CVA tenderness  Respiratory: CTAB   Cardiovascular: S1 and S2, RRR, no M/G/R  Abd: BS+, soft, NT/ND, No CVAT  : Normal phallus,open meatus,bilateral descended testes, no masses  CARLI: Normal prostate, no masses  Extremities: No peripheral edema  Vascular: 2+ peripheral pulses  Neurological: A/O x 3, no focal deficits  Psychiatric: Normal mood, normal affect  Musculoskeletal: 5/5 strength b/l upper and lower extremities  Skin: No rashes    LABS:                        12.0   9.57  )-----------( 239      ( 18 May 2022 09:09 )             35.9     -    142  |  107  |  9   ----------------------------<  109<H>  3.4<L>   |  28  |  0.52    Ca    9.0      18 May 2022 09:09    TPro  7.1  /  Alb  3.1<L>  /  TBili  0.3  /  DBili  x   /  AST  30  /  ALT  90<H>  /  AlkPhos  87          Urine Culture:   Blood Cultures      RADIOLOGY & ADDITIONAL STUDIES:  < from: CT Abdomen and Pelvis No Cont (22 @ 09:29) >    ACC: [id number] EXAM:  CT ABDOMEN AND PELVIS                          PROCEDURE DATE:  2022          INTERPRETATION:  CLINICAL INFORMATION: Abdominal pain, renal calculus,   recent lithotripsy    COMPARISON: CT abdomen pelvis 2019    CONTRAST/COMPLICATIONS:  IV Contrast: NONE  Oral Contrast: NONE  Complications: None reported at time of study completion    PROCEDURE:  CT of the Abdomen and Pelvis was performed.  Sagittal and coronal reformats were performed.    FINDINGS:    LOWER CHEST: No visualized pleural effusion    Solid organ evaluation limited due to noncontrast technique.    LIVER: Liver size within normal limits. Left hepatic lobe cystic lesion   measures 1.9 cm, previously 1.2 cm on 2019. Additional   hypodensitiesof the liver are too small to characterize, but are similar   to prior study. Abdominal MRI can be obtained for further   characterization on nonemergency basis.  BILE DUCTS: No intrahepatic biliary distention. Common bile duct   prominence is compatible with postcholecystectomy status.  GALLBLADDER: Cholecystectomy.  SPLEEN: Normal size  PANCREAS: No main ductal dilatation  ADRENALS: Adrenal gland thickening on the left.  KIDNEYS/URETERS: No hydronephrosis. There is a mid to distal right   ureteral calculus measuring 4 mm. Several additional bilateral   nonobstructing renal calculi up to 3 mm. Subcentimeter dense focus of the   left kidney on image 26-27 series 2 could reflect area of mineralization   or hemorrhagic/proteinaceous cyst.    BLADDER: Underdistended  REPRODUCTIVE ORGANS: Hysterectomy.    BOWEL: Small hiatal hernia. Stomach is underdistended. No small bowel   distention. Mild stool burden of the colon limits evaluation of the   colonic mucosa. Colonic diverticulosis. Focal short segment inflammation   adjacent to sigmoid diverticulum on image 77 series 2 concerning for   acute uncomplicated diverticulitis.  PERITONEUM: Trace left lower quadrant fluid. No abscess or free air.  VESSELS: No aneurysm of the abdominal aorta. Atheromatous change. There   is a 1 cm nodular density in the left upper quadrant adjacent to the   stomach and left adrenal gland, tubular in orientation on sagittal   images, favored represent a focal splenic artery aneurysm, stable in size   since 2019. Correlate with postcontrast imaging on nonemergency basis for   characterization.  RETROPERITONEUM/LYMPH NODES: Small volume nodes.  ABDOMINAL WALL: Tiny fat-containing umbilical hernia.  BONES: Postsurgical changes of the lumbar laminectomy and fusion.   Degenerative changes, osseous demineralization, and scoliosis. Central   canal and neural foramina are not adequately assessed on this study.    IMPRESSION:    Limited noncontrast study.    Acute uncomplicated diverticulitis of the sigmoid colon.    No hydronephrosis of the kidneys. 4 mm mid to distal right ureteral   calculus. Additional nonobstructing bilateral renal calculi up to 3 mm.    1 cm nodular density in the left upper quadrant adjacent to the stomach   and left adrenal gland,tubular in orientation on sagittal images,   favored to represent a focal splenic artery aneurysm, stable in size   since 2019. Correlate with postcontrast imaging on nonemergency basis for   characterization.    --- End of Report ---            NO BEAR M.D., ATTENDING RADIOLOGIST  This document has been electronically signed. May 13 2022 10:21AM    < end of copied text >   CHIEF COMPLAINT:    HPI:  65 y/o F with a PMHx of anxiety, hypothyroidism, asthma, kidney stones (s/p LK ESWL on 22), who presents with 1 day of lower quadrant abd pain and R flank pain. Pain associated with nausea and over 10 episodes of NBNB vomiting, and inability to tolerate PO. Pt has chills, but denies any fevers, chest pain, palpitations, SOB, URI sx, change in bowel habits, hematochezia, or LE pain/swelling. Pt does have some dysuria, which is typical for when she gets kidney stones. Pt was recently on nitrofurantoin prior to lithotripsy. Pain was 8/10. Her last colonoscopy was 7 years ago, and she has never had diverticulosis/diverticulitis before.    CT A/P showing acute uncomplicated sigmoid diverticulitis, 4 mm mid-distal R ureteral calculus, b/l 3 mm non-obstructing renal calculi, no hydronephrosisSince admission sx's improving; pt afeb. Ucx neg.      PAST MEDICAL & SURGICAL HISTORY:  Hypothyroidism      Migraines      Anxiety      Kidney Calculi      Obesity      Fibromyalgia      Kidney Calculus- Lithotripsy x 3      S/P Hysterectomy       Delivery      S/P Total Thyroidectomy      History of Appendectomy      Asthma      S/P Laparoscopic Cholecystectomy      History of lumbar spinal fusion          REVIEW OF SYSTEMS:    CONSTITUTIONAL: No weakness, fevers or chills  EYES/ENT: No visual changes;  No vertigo or throat pain   NECK: No pain or stiffness  RESPIRATORY: No cough, wheezing, hemoptysis; No shortness of breath  CARDIOVASCULAR: No chest pain or palpitations  GASTROINTESTINAL: per hpi  GENITOURINARY: No frequency or hematuria  NEUROLOGICAL: No numbness or weakness  SKIN: No itching, burning, rashes, or lesions   All other review of systems is negative unless indicated above.    MEDICATIONS  (STANDING):  budesonide 160 MICROgram(s)/formoterol 4.5 MICROgram(s) Inhaler 2 Puff(s) Inhalation two times a day  ciprofloxacin     Tablet 500 milliGRAM(s) Oral every 12 hours  clonazePAM  Tablet 1 milliGRAM(s) Oral at bedtime  enoxaparin Injectable 40 milliGRAM(s) SubCutaneous every 24 hours  levothyroxine 137 MICROGram(s) Oral daily  metroNIDAZOLE    Tablet 500 milliGRAM(s) Oral every 8 hours  montelukast 10 milliGRAM(s) Oral daily  pantoprazole  Injectable 40 milliGRAM(s) IV Push daily  tamsulosin 0.4 milliGRAM(s) Oral at bedtime    MEDICATIONS  (PRN):  acetaminophen     Tablet .. 650 milliGRAM(s) Oral every 6 hours PRN Temp greater or equal to 38C (100.4F), Mild Pain (1 - 3)  acetaminophen 300 mG/butalbital 50 mG/ caffeine 40 mG 1 Capsule(s) Oral every 8 hours PRN migraine  ALBUTerol    90 MICROgram(s) HFA Inhaler 1 Puff(s) Inhalation every 6 hours PRN Shortness of Breath and/or Wheezing  benzonatate 200 milliGRAM(s) Oral three times a day PRN Cough  HYDROmorphone  Injectable 0.25 milliGRAM(s) IV Push every 4 hours PRN Moderate Pain (4 - 6)  HYDROmorphone  Injectable 0.5 milliGRAM(s) IV Push every 4 hours PRN Severe Pain (7 - 10)  melatonin 3 milliGRAM(s) Oral at bedtime PRN Insomnia  metoclopramide Injectable 10 milliGRAM(s) IV Push every 6 hours PRN nausea/vomiting  ondansetron Injectable 4 milliGRAM(s) IV Push every 4 hours PRN Nausea and/or Vomiting  trimethobenzamide Injectable 200 milliGRAM(s) IntraMuscular three times a day PRN Nausea and/or Vomiting      Allergies    codeine (Anaphylaxis; Hives)  fish (Anaphylaxis)  latex (Anaphylaxis)  morphine (Nausea; Vomiting)  penicillin (Hives)    Intolerances        Social History:  Alcohol: Denied  Smoking: Nonsmoker  Drug Use: Denied  Marital Status:     FAMILY HISTORY:      Vital Signs Last 24 Hrs  T(C): 36.9 (18 May 2022 04:45), Max: 36.9 (18 May 2022 04:45)  T(F): 98.5 (18 May 2022 04:45), Max: 98.5 (18 May 2022 04:45)  HR: 72 (18 May 2022 04:45) (56 - 72)  BP: 130/78 (18 May 2022 04:45) (130/78 - 152/85)  BP(mean): --  RR: 17 (18 May 2022 04:45) (16 - 18)  SpO2: 96% (18 May 2022 04:45) (95% - 99%)    PHYSICAL EXAM:    Constitutional: NAD, well-developed  HEENT: EOMI, Normal Hearing  Neck: No LAD  Abd: BS+, soft, NT/ND, Mild R CVAT  Neurological: A/O x 3, no focal deficits  Psychiatric: Normal mood, normal affect      LABS:                        12.0   9.57  )-----------( 239      ( 18 May 2022 09:09 )             35.9         142  |  107  |  9   ----------------------------<  109<H>  3.4<L>   |  28  |  0.52    Ca    9.0      18 May 2022 09:09    TPro  7.1  /  Alb  3.1<L>  /  TBili  0.3  /  DBili  x   /  AST  30  /  ALT  90<H>  /  AlkPhos  87          Urine Culture:   Blood Cultures      RADIOLOGY & ADDITIONAL STUDIES:  < from: CT Abdomen and Pelvis No Cont (22 @ 09:29) >    ACC: [id number] EXAM:  CT ABDOMEN AND PELVIS                          PROCEDURE DATE:  2022          INTERPRETATION:  CLINICAL INFORMATION: Abdominal pain, renal calculus,   recent lithotripsy    COMPARISON: CT abdomen pelvis 2019    CONTRAST/COMPLICATIONS:  IV Contrast: NONE  Oral Contrast: NONE  Complications: None reported at time of study completion    PROCEDURE:  CT of the Abdomen and Pelvis was performed.  Sagittal and coronal reformats were performed.    FINDINGS:    LOWER CHEST: No visualized pleural effusion    Solid organ evaluation limited due to noncontrast technique.    LIVER: Liver size within normal limits. Left hepatic lobe cystic lesion   measures 1.9 cm, previously 1.2 cm on 2019. Additional   hypodensitiesof the liver are too small to characterize, but are similar   to prior study. Abdominal MRI can be obtained for further   characterization on nonemergency basis.  BILE DUCTS: No intrahepatic biliary distention. Common bile duct   prominence is compatible with postcholecystectomy status.  GALLBLADDER: Cholecystectomy.  SPLEEN: Normal size  PANCREAS: No main ductal dilatation  ADRENALS: Adrenal gland thickening on the left.  KIDNEYS/URETERS: No hydronephrosis. There is a mid to distal right   ureteral calculus measuring 4 mm. Several additional bilateral   nonobstructing renal calculi up to 3 mm. Subcentimeter dense focus of the   left kidney on image 26-27 series 2 could reflect area of mineralization   or hemorrhagic/proteinaceous cyst.    BLADDER: Underdistended  REPRODUCTIVE ORGANS: Hysterectomy.    BOWEL: Small hiatal hernia. Stomach is underdistended. No small bowel   distention. Mild stool burden of the colon limits evaluation of the   colonic mucosa. Colonic diverticulosis. Focal short segment inflammation   adjacent to sigmoid diverticulum on image 77 series 2 concerning for   acute uncomplicated diverticulitis.  PERITONEUM: Trace left lower quadrant fluid. No abscess or free air.  VESSELS: No aneurysm of the abdominal aorta. Atheromatous change. There   is a 1 cm nodular density in the left upper quadrant adjacent to the   stomach and left adrenal gland, tubular in orientation on sagittal   images, favored represent a focal splenic artery aneurysm, stable in size   since 2019. Correlate with postcontrast imaging on nonemergency basis for   characterization.  RETROPERITONEUM/LYMPH NODES: Small volume nodes.  ABDOMINAL WALL: Tiny fat-containing umbilical hernia.  BONES: Postsurgical changes of the lumbar laminectomy and fusion.   Degenerative changes, osseous demineralization, and scoliosis. Central   canal and neural foramina are not adequately assessed on this study.    IMPRESSION:    Limited noncontrast study.    Acute uncomplicated diverticulitis of the sigmoid colon.    No hydronephrosis of the kidneys. 4 mm mid to distal right ureteral   calculus. Additional nonobstructing bilateral renal calculi up to 3 mm.    1 cm nodular density in the left upper quadrant adjacent to the stomach   and left adrenal gland,tubular in orientation on sagittal images,   favored to represent a focal splenic artery aneurysm, stable in size   since 2019. Correlate with postcontrast imaging on nonemergency basis for   characterization.    --- End of Report ---            NO BEAR M.D., ATTENDING RADIOLOGIST  This document has been electronically signed. May 13 2022 10:21AM    < end of copied text >

## 2022-05-19 ENCOUNTER — NON-APPOINTMENT (OUTPATIENT)
Age: 67
End: 2022-05-19

## 2022-05-20 ENCOUNTER — APPOINTMENT (OUTPATIENT)
Dept: FAMILY MEDICINE | Facility: CLINIC | Age: 67
End: 2022-05-20
Payer: MEDICARE

## 2022-05-20 VITALS
HEART RATE: 70 BPM | TEMPERATURE: 97.2 F | DIASTOLIC BLOOD PRESSURE: 80 MMHG | BODY MASS INDEX: 29.84 KG/M2 | HEIGHT: 60 IN | OXYGEN SATURATION: 98 % | WEIGHT: 152 LBS | SYSTOLIC BLOOD PRESSURE: 110 MMHG

## 2022-05-20 VITALS — SYSTOLIC BLOOD PRESSURE: 108 MMHG | DIASTOLIC BLOOD PRESSURE: 82 MMHG

## 2022-05-20 DIAGNOSIS — N20.1 CALCULUS OF URETER: ICD-10-CM

## 2022-05-20 PROCEDURE — 99215 OFFICE O/P EST HI 40 MIN: CPT | Mod: 25

## 2022-05-20 PROCEDURE — 36415 COLL VENOUS BLD VENIPUNCTURE: CPT

## 2022-05-20 NOTE — ASSESSMENT
[As per surgery] : as per surgery [FreeTextEntry4] : Medically optimized for proposed procedure.\par \par cont KCL 10 mEQ qd

## 2022-05-20 NOTE — PHYSICAL EXAM
[No Acute Distress] : no acute distress [Well Nourished] : well nourished [Well Developed] : well developed [Well-Appearing] : well-appearing [EOMI] : extraocular movements intact [Normal Outer Ear/Nose] : the outer ears and nose were normal in appearance [No JVD] : no jugular venous distention [No Respiratory Distress] : no respiratory distress  [No Accessory Muscle Use] : no accessory muscle use [Clear to Auscultation] : lungs were clear to auscultation bilaterally [Normal Rate] : normal rate  [Regular Rhythm] : with a regular rhythm [Normal S1, S2] : normal S1 and S2 [No Carotid Bruits] : no carotid bruits [No Edema] : there was no peripheral edema [Soft] : abdomen soft [Non Tender] : non-tender [No HSM] : no HSM [No CVA Tenderness] : no CVA  tenderness [Grossly Normal Strength/Tone] : grossly normal strength/tone [No Rash] : no rash [Coordination Grossly Intact] : coordination grossly intact [No Focal Deficits] : no focal deficits [Normal Gait] : normal gait [Normal Affect] : the affect was normal [Normal Mood] : the mood was normal [Normal Insight/Judgement] : insight and judgment were intact [de-identified] : +ve mild lower abdominal discomfort B/L to deep palpation

## 2022-05-20 NOTE — HISTORY OF PRESENT ILLNESS
[No Pertinent Cardiac History] : no history of aortic stenosis, atrial fibrillation, coronary artery disease, recent myocardial infarction, or implantable device/pacemaker [Asthma] : asthma [COPD] : COPD [Sleep Apnea] : no sleep apnea [Smoker] : smoker [Chronic Anticoagulation] : no chronic anticoagulation [Chronic Kidney Disease] : no chronic kidney disease [Diabetes] : no diabetes [(Patient denies any chest pain, claudication, dyspnea on exertion, orthopnea, palpitations or syncope)] : Patient denies any chest pain, claudication, dyspnea on exertion, orthopnea, palpitations or syncope [FreeTextEntry1] : Lithotripsy [FreeTextEntry2] : 05/23/2022 [FreeTextEntry3] : Dr. Malachi Moreira  [FreeTextEntry4] : 67 yo female for medical clearance. Pt scheduled for ESWL on 5/23/22 c Dr. Moreira at [facility]\par (F) [phone]\par \par no CP/SOB c activity no dizziness no palpitations \par no N/V/D   s/p acute diverticulitis 5/13/22.  Completion of abx course Cipro 500mg bid and Flagyl 500mg q8h  today 5/20/22.   Reports BM yesterday WNL no bloody/black stools no diarrhea \par +ve urinary pressure/discomfort secondary to known  R ureteral calculus \par \par \par Denies f/c/s no cough sense of smell/taste intact \par \par *** pt reports increased vomiting associated c anesthesia*** \par +ve allergy to shellfish/latex/iodine \par NO hx of Blood Transfusion \par ABO Blood Type= A positive \par NO dentures.

## 2022-05-23 LAB
ANION GAP SERPL CALC-SCNC: 11 MMOL/L
BUN SERPL-MCNC: 15 MG/DL
CALCIUM SERPL-MCNC: 9.5 MG/DL
CHLORIDE SERPL-SCNC: 104 MMOL/L
CO2 SERPL-SCNC: 29 MMOL/L
CREAT SERPL-MCNC: 0.56 MG/DL
EGFR: 101 ML/MIN/1.73M2
GLUCOSE SERPL-MCNC: 104 MG/DL
POTASSIUM SERPL-SCNC: 3.6 MMOL/L
SODIUM SERPL-SCNC: 144 MMOL/L

## 2022-05-24 ENCOUNTER — RX RENEWAL (OUTPATIENT)
Age: 67
End: 2022-05-24

## 2022-05-25 DIAGNOSIS — B37.3 CANDIDIASIS OF VULVA AND VAGINA: ICD-10-CM

## 2022-06-03 ENCOUNTER — OUTPATIENT (OUTPATIENT)
Dept: OUTPATIENT SERVICES | Facility: HOSPITAL | Age: 67
LOS: 1 days | End: 2022-06-03
Payer: MEDICARE

## 2022-06-03 DIAGNOSIS — Z98.1 ARTHRODESIS STATUS: Chronic | ICD-10-CM

## 2022-06-03 DIAGNOSIS — K57.32 DIVERTICULITIS OF LARGE INTESTINE WITHOUT PERFORATION OR ABSCESS WITHOUT BLEEDING: ICD-10-CM

## 2022-06-03 PROCEDURE — 74176 CT ABD & PELVIS W/O CONTRAST: CPT

## 2022-06-03 PROCEDURE — 74176 CT ABD & PELVIS W/O CONTRAST: CPT | Mod: 26

## 2022-07-05 ENCOUNTER — APPOINTMENT (OUTPATIENT)
Dept: HEPATOLOGY | Facility: CLINIC | Age: 67
End: 2022-07-05

## 2022-07-05 PROCEDURE — 91200 LIVER ELASTOGRAPHY: CPT

## 2022-07-12 ENCOUNTER — APPOINTMENT (OUTPATIENT)
Dept: FAMILY MEDICINE | Facility: CLINIC | Age: 67
End: 2022-07-12

## 2022-07-14 ENCOUNTER — RX RENEWAL (OUTPATIENT)
Age: 67
End: 2022-07-14

## 2022-07-25 ENCOUNTER — APPOINTMENT (OUTPATIENT)
Dept: FAMILY MEDICINE | Facility: CLINIC | Age: 67
End: 2022-07-25

## 2022-07-25 PROCEDURE — 99441: CPT

## 2022-07-25 NOTE — HISTORY OF PRESENT ILLNESS
[Home] : at home, [unfilled] , at the time of the visit. [Medical Office: ([facility])___] : at the medical office located in  [Verbal consent obtained from patient] : the patient, [unfilled] [FreeTextEntry8] : 67yo F presents with vaginal discharge and itching after long course of antibiotics. Pt reports she tends to get yeast infections after antibiotics. Pt denies any odor, external itching, pelvic pain, fevers or chills.

## 2022-07-25 NOTE — PLAN
[FreeTextEntry1] : Start fluconazole 1 pill now and in 72 hours\par if symptoms don’t resolve encourage to follow up in office or with OBGYN

## 2022-08-08 ENCOUNTER — APPOINTMENT (OUTPATIENT)
Dept: FAMILY MEDICINE | Facility: CLINIC | Age: 67
End: 2022-08-08

## 2022-08-08 VITALS
TEMPERATURE: 96.7 F | DIASTOLIC BLOOD PRESSURE: 80 MMHG | HEART RATE: 72 BPM | OXYGEN SATURATION: 98 % | HEIGHT: 60 IN | BODY MASS INDEX: 29.84 KG/M2 | WEIGHT: 152 LBS | SYSTOLIC BLOOD PRESSURE: 110 MMHG

## 2022-08-08 DIAGNOSIS — R87.620 ATYPICAL SQUAMOUS CELLS OF UNDETERMINED SIGNIFICANCE ON CYTOLOGIC SMEAR OF VAGINA (ASC-US): ICD-10-CM

## 2022-08-08 DIAGNOSIS — R53.83 OTHER FATIGUE: ICD-10-CM

## 2022-08-08 PROCEDURE — 99214 OFFICE O/P EST MOD 30 MIN: CPT

## 2022-08-08 RX ORDER — BENZONATATE 200 MG/1
200 CAPSULE ORAL
Qty: 270 | Refills: 3 | Status: COMPLETED | COMMUNITY
Start: 2022-03-23 | End: 2022-08-08

## 2022-08-08 NOTE — PHYSICAL EXAM
[No Acute Distress] : no acute distress [Well Nourished] : well nourished [Well Developed] : well developed [Well-Appearing] : well-appearing [EOMI] : extraocular movements intact [Normal Outer Ear/Nose] : the outer ears and nose were normal in appearance [No JVD] : no jugular venous distention [No Lymphadenopathy] : no lymphadenopathy [Supple] : supple [No Respiratory Distress] : no respiratory distress  [No Accessory Muscle Use] : no accessory muscle use [Clear to Auscultation] : lungs were clear to auscultation bilaterally [Normal Rate] : normal rate  [Regular Rhythm] : with a regular rhythm [Normal S1, S2] : normal S1 and S2 [No Carotid Bruits] : no carotid bruits [No Edema] : there was no peripheral edema [Non-distended] : non-distended [No CVA Tenderness] : no CVA  tenderness [Grossly Normal Strength/Tone] : grossly normal strength/tone [No Rash] : no rash [Coordination Grossly Intact] : coordination grossly intact [Speech Grossly Normal] : speech grossly normal [Normal Affect] : the affect was normal [Normal Mood] : the mood was normal [Normal Insight/Judgement] : insight and judgment were intact

## 2022-08-08 NOTE — HISTORY OF PRESENT ILLNESS
[FreeTextEntry1] : MARI is a 66 year old woman here for a follow up on labs [de-identified] : 67 yo  female for f/u on KATHERINE/Asthma/Seasonal Allergies \par \par as above feels well   \par s/p Colonoscopy 7/6/22  polyp x 1 repeat 7/2027   (5 yrs)  \par \par

## 2022-08-15 ENCOUNTER — APPOINTMENT (OUTPATIENT)
Dept: OBGYN | Facility: CLINIC | Age: 67
End: 2022-08-15

## 2022-08-24 ENCOUNTER — RX RENEWAL (OUTPATIENT)
Age: 67
End: 2022-08-24

## 2022-10-06 ENCOUNTER — APPOINTMENT (OUTPATIENT)
Dept: OBGYN | Facility: CLINIC | Age: 67
End: 2022-10-06

## 2022-10-06 ENCOUNTER — LABORATORY RESULT (OUTPATIENT)
Age: 67
End: 2022-10-06

## 2022-10-06 VITALS
SYSTOLIC BLOOD PRESSURE: 122 MMHG | WEIGHT: 144 LBS | BODY MASS INDEX: 28.27 KG/M2 | HEIGHT: 60 IN | DIASTOLIC BLOOD PRESSURE: 78 MMHG

## 2022-10-06 DIAGNOSIS — Z87.2 PERSONAL HISTORY OF DISEASES OF THE SKIN AND SUBCUTANEOUS TISSUE: ICD-10-CM

## 2022-10-06 DIAGNOSIS — Z91.018 ALLERGY TO OTHER FOODS: ICD-10-CM

## 2022-10-06 DIAGNOSIS — R92.2 INCONCLUSIVE MAMMOGRAM: ICD-10-CM

## 2022-10-06 DIAGNOSIS — Z12.4 ENCOUNTER FOR SCREENING FOR MALIGNANT NEOPLASM OF CERVIX: ICD-10-CM

## 2022-10-06 DIAGNOSIS — J30.2 OTHER SEASONAL ALLERGIC RHINITIS: ICD-10-CM

## 2022-10-06 DIAGNOSIS — Z11.59 ENCOUNTER FOR SCREENING FOR OTHER VIRAL DISEASES: ICD-10-CM

## 2022-10-06 DIAGNOSIS — Z12.31 ENCOUNTER FOR SCREENING MAMMOGRAM FOR MALIGNANT NEOPLASM OF BREAST: ICD-10-CM

## 2022-10-06 DIAGNOSIS — Z23 ENCOUNTER FOR IMMUNIZATION: ICD-10-CM
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
## 2022-10-06 DIAGNOSIS — Z92.29 PERSONAL HISTORY OF OTHER DRUG THERAPY: ICD-10-CM

## 2022-10-06 DIAGNOSIS — R82.89 OTHER ABNRM FNDNGS ON CYTOLOGICAL: ICD-10-CM

## 2022-10-06 DIAGNOSIS — Z12.39 ENCOUNTER FOR OTHER SCREENING FOR MALIGNANT NEOPLASM OF BREAST: ICD-10-CM

## 2022-10-06 DIAGNOSIS — R87.615 UNSATISFACTORY CYTOLOGIC SMEAR OF CERVIX: ICD-10-CM

## 2022-10-06 DIAGNOSIS — Z87.898 PERSONAL HISTORY OF OTHER SPECIFIED CONDITIONS: ICD-10-CM

## 2022-10-06 DIAGNOSIS — K57.32 DIVERTICULITIS OF LARGE INTESTINE W/OUT PERFORATION OR ABSCESS W/OUT BLEEDING: ICD-10-CM

## 2022-10-06 PROCEDURE — 99397 PER PM REEVAL EST PAT 65+ YR: CPT

## 2022-10-06 RX ORDER — HYDROXYZINE HYDROCHLORIDE 10 MG/1
10 TABLET ORAL 3 TIMES DAILY
Qty: 30 | Refills: 0 | Status: DISCONTINUED | COMMUNITY
Start: 2020-11-02 | End: 2022-10-06

## 2022-10-11 PROBLEM — Z12.4 CERVICAL CANCER SCREENING: Status: RESOLVED | Noted: 2019-09-20 | Resolved: 2022-10-11

## 2022-10-11 PROBLEM — Z91.018 HISTORY OF FOOD ALLERGY: Status: RESOLVED | Noted: 2020-01-31 | Resolved: 2022-10-11

## 2022-10-11 PROBLEM — Z87.2 HISTORY OF SKIN PRURITUS: Status: RESOLVED | Noted: 2020-11-02 | Resolved: 2022-10-11

## 2022-10-11 PROBLEM — R82.89 ABNORMAL URINE CYTOLOGY: Status: RESOLVED | Noted: 2019-09-20 | Resolved: 2022-10-11

## 2022-10-11 PROBLEM — Z87.898 HISTORY OF URINARY FREQUENCY: Status: RESOLVED | Noted: 2019-09-13 | Resolved: 2022-10-11

## 2022-10-11 NOTE — HISTORY OF PRESENT ILLNESS
[HPV test offered] : HPV test offered [N] : Patient reports normal menses [Y] : Positive pregnancy history [Menarche Age: ____] : age at menarche was [unfilled] [Currently Active] : currently active [Men] : men [Mammogramdate] : 02/25/2021 [TextBox_19] : BR1 [PapSmeardate] : 112/16/2020 [TextBox_31] : Negative [ColonoscopyDate] : 07/06/2022 [HPVDate] : 12/16/2020 [TextBox_78] : Negative [LMPDate] : 1992 [de-identified] : had a Total Hysterectomy [PGxTotal] : 6 [[facility] Center[facility]ulerm] : 5 [[facility]axLiving] : 5 [PGHxABSpont] : 1 [FreeTextEntry1] : 1992

## 2022-10-11 NOTE — END OF VISIT
[FreeTextEntry3] : I, Ana Jones solely acted as scribe for Dr. Aleah Wang on 10/06/2022 \par All medical entries made by the Scribe were at my, Dr. Wang’s, direction and personally\par dictated by me on 10/06/2022 . I have reviewed the chart and agree that the record\par accurately reflects my personal performance of the history, physical exam, assessment and plan. I\par have also personally directed, reviewed, and agreed with the chart.

## 2022-10-11 NOTE — PHYSICAL EXAM
[Chaperone Present] : A chaperone was present in the examining room during all aspects of the physical examination [Appropriately responsive] : appropriately responsive [Alert] : alert [No Acute Distress] : no acute distress [Soft] : soft [Non-tender] : non-tender [Non-distended] : non-distended [Oriented x3] : oriented x3 [Examination Of The Breasts] : a normal appearance [No Discharge] : no discharge [No Masses] : no breast masses were palpable [Labia Majora] : normal [Labia Minora] : normal [Normal] : normal [No Bleeding] : There was no active vaginal bleeding [Absent] : absent [Uterine Adnexae] : normal [FreeTextEntry1] : MOA: Sue NICHOLS  [Atrophy] : atrophy [Dry Mucosa] : dry mucosa

## 2022-10-11 NOTE — DISCUSSION/SUMMARY
[FreeTextEntry1] : Benign breast and pelvic exam. Uterus and cervix are surgically absent. Vaginal pap done today.\par \par Prescription for mammogram screening and breast US given.\par \par Self-breast exam reviewed.\par \par She will follow up annually and as needed.\par

## 2022-10-12 LAB
CYTOLOGY CVX/VAG DOC THIN PREP: NORMAL
HPV HIGH+LOW RISK DNA PNL CVX: DETECTED

## 2022-10-14 ENCOUNTER — MED ADMIN CHARGE (OUTPATIENT)
Age: 67
End: 2022-10-14

## 2022-10-14 ENCOUNTER — APPOINTMENT (OUTPATIENT)
Dept: FAMILY MEDICINE | Facility: CLINIC | Age: 67
End: 2022-10-14

## 2022-10-14 VITALS
BODY MASS INDEX: 28.47 KG/M2 | WEIGHT: 145 LBS | HEIGHT: 60 IN | OXYGEN SATURATION: 98 % | SYSTOLIC BLOOD PRESSURE: 118 MMHG | TEMPERATURE: 97.6 F | DIASTOLIC BLOOD PRESSURE: 76 MMHG | HEART RATE: 68 BPM

## 2022-10-14 DIAGNOSIS — E78.5 HYPERLIPIDEMIA, UNSPECIFIED: ICD-10-CM

## 2022-10-14 DIAGNOSIS — K21.9 GASTRO-ESOPHAGEAL REFLUX DISEASE W/OUT ESOPHAGITIS: ICD-10-CM

## 2022-10-14 DIAGNOSIS — Z23 ENCOUNTER FOR IMMUNIZATION: ICD-10-CM

## 2022-10-14 PROCEDURE — G0008: CPT

## 2022-10-14 PROCEDURE — 99214 OFFICE O/P EST MOD 30 MIN: CPT

## 2022-10-14 PROCEDURE — 90662 IIV NO PRSV INCREASED AG IM: CPT

## 2022-10-14 RX ORDER — MECLIZINE HYDROCHLORIDE 12.5 MG/1
12.5 TABLET ORAL 3 TIMES DAILY
Qty: 30 | Refills: 0 | Status: COMPLETED | COMMUNITY
Start: 2022-03-23 | End: 2022-10-14

## 2022-10-14 NOTE — PLAN
[FreeTextEntry1] : STABLE, cont current meds \par \par cont D/E \par cont LT4 137mcg qd (see med orders) \par \par \par Trial of Pantoprazole 40mg  qd  (see med order) \par \par see immunization orders

## 2022-10-14 NOTE — PHYSICAL EXAM
[No Acute Distress] : no acute distress [Well Nourished] : well nourished [Well Developed] : well developed [Well-Appearing] : well-appearing [EOMI] : extraocular movements intact [Normal Outer Ear/Nose] : the outer ears and nose were normal in appearance [No JVD] : no jugular venous distention [No Respiratory Distress] : no respiratory distress  [No Accessory Muscle Use] : no accessory muscle use [Clear to Auscultation] : lungs were clear to auscultation bilaterally [Normal Rate] : normal rate  [Regular Rhythm] : with a regular rhythm [Normal S1, S2] : normal S1 and S2 [No Carotid Bruits] : no carotid bruits [No Edema] : there was no peripheral edema [No Palpable Aorta] : no palpable aorta [Non-distended] : non-distended [No CVA Tenderness] : no CVA  tenderness [Grossly Normal Strength/Tone] : grossly normal strength/tone [No Rash] : no rash [Coordination Grossly Intact] : coordination grossly intact [No Focal Deficits] : no focal deficits [Normal Gait] : normal gait [Normal Affect] : the affect was normal [Normal Mood] : the mood was normal [Normal Insight/Judgement] : insight and judgment were intact

## 2022-10-14 NOTE — HISTORY OF PRESENT ILLNESS
[FreeTextEntry1] : MARI is a 67 year old female here for a follow up. [de-identified] : 68 yo female for  f/u on Asthma/Hypothyroidism/KATHERINE \par \par as above feels well ,  reports increased eructation,   pt reports R lower quadrant discomfort daily.  +BM daily  c aid of stool softener  no bloody/black stools

## 2022-11-15 NOTE — DIETITIAN INITIAL EVALUATION ADULT - PROBLEM SELECTOR PROBLEM 5
Updated Dr. Aquino Salts on SVE 2/70/-2 & SROM at [phone]. Ctx q2-4 minutes. Orders for [facility] after patient gets epidural and to start pitocin at 2020. Hypothyroidism

## 2022-11-16 ENCOUNTER — RX RENEWAL (OUTPATIENT)
Age: 67
End: 2022-11-16

## 2022-11-17 ENCOUNTER — APPOINTMENT (OUTPATIENT)
Dept: FAMILY MEDICINE | Facility: CLINIC | Age: 67
End: 2022-11-17

## 2022-11-17 PROCEDURE — 0004A: CPT

## 2022-12-22 ENCOUNTER — RX RENEWAL (OUTPATIENT)
Age: 67
End: 2022-12-22

## 2023-02-09 ENCOUNTER — APPOINTMENT (OUTPATIENT)
Dept: FAMILY MEDICINE | Facility: CLINIC | Age: 68
End: 2023-02-09
Payer: MEDICARE

## 2023-02-09 VITALS
OXYGEN SATURATION: 98 % | HEIGHT: 60 IN | DIASTOLIC BLOOD PRESSURE: 60 MMHG | SYSTOLIC BLOOD PRESSURE: 100 MMHG | WEIGHT: 145 LBS | HEART RATE: 72 BPM | BODY MASS INDEX: 28.47 KG/M2 | TEMPERATURE: 98.2 F

## 2023-02-09 DIAGNOSIS — R06.2 WHEEZING: ICD-10-CM

## 2023-02-09 PROCEDURE — G0439: CPT

## 2023-02-09 PROCEDURE — 93000 ELECTROCARDIOGRAM COMPLETE: CPT

## 2023-02-09 NOTE — HEALTH RISK ASSESSMENT
[Patient reported mammogram was normal] : Patient reported mammogram was normal [Patient reported PAP Smear was normal] : Patient reported PAP Smear was normal [1 or 2 (0 pts)] : 1 or 2 (0 points) [Never (0 pts)] : Never (0 points) [No] : In the past 12 months have you used drugs other than those required for medical reasons? No [No falls in past year] : Patient reported no falls in the past year [Change in mental status noted] : No change in mental status noted [Language] : denies difficulty with language [Behavior] : denies difficulty with behavior [Learning/Retaining New Information] : denies difficulty learning/retaining new information [Handling Complex Tasks] : denies difficulty handling complex tasks [Reasoning] : denies difficulty with reasoning [Spatial Ability and Orientation] : denies difficulty with spatial ability and orientation [With Family] : lives with family [# of Members in Household ___] :  household currently consist of [unfilled] member(s) [Retired] : retired [Less Than High School] : less than high school [] :  [# Of Children ___] : has [unfilled] children [Sexually Active] : sexually active [Feels Safe at Home] : Feels safe at home [Fully functional (bathing, dressing, toileting, transferring, walking, feeding)] : Fully functional (bathing, dressing, toileting, transferring, walking, feeding) [Fully functional (using the telephone, shopping, preparing meals, housekeeping, doing laundry, using] : Fully functional and needs no help or supervision to perform IADLs (using the telephone, shopping, preparing meals, housekeeping, doing laundry, using transportation, managing medications and managing finances) [Reports changes in hearing] : Reports no changes in hearing [Reports changes in vision] : Reports no changes in vision [Reports normal functional visual acuity (ie: able to read med bottle)] : Reports normal functional visual acuity [Reports changes in dental health] : Reports no changes in dental health [Smoke Detector] : smoke detector [Carbon Monoxide Detector] : carbon monoxide detector [Guns at Home] : no guns at home [Seat Belt] :  uses seat belt [MammogramDate] : 10/22 [PapSmearDate] : 10/22 [ColonoscopyDate] : 07/22 [ColonoscopyComments] : Dr. Chandler  repeat 5 yrs  [With Patient/Caregiver] : , with patient/caregiver [Designated Healthcare Proxy] : Designated healthcare proxy [Relationship: ___] : Relationship: [unfilled] [DNR] : DNR [AdvancecareDate] : 02/23 [Former] : Former

## 2023-02-09 NOTE — PLAN
[FreeTextEntry1] : EKG performed:   SR at 62 bpm, normal axis,  no acute  ST/T changes  \par \par see lab orders  \par \par cont  Montelukast 10mg hs \par cont Zyrtec 10mg hs

## 2023-02-09 NOTE — HISTORY OF PRESENT ILLNESS
[FreeTextEntry1] : Pt. c/o right swollen foot going on couple months.  [de-identified] : MCR Annual Wellness Exam \par \par no CP/SOB c activity no dizziness no palpitations \par no N/V/D +BM qd NL no bloody/black stools \par no urinary complaints

## 2023-02-09 NOTE — PHYSICAL EXAM
[No Acute Distress] : no acute distress [Well Nourished] : well nourished [Well Developed] : well developed [Well-Appearing] : well-appearing [EOMI] : extraocular movements intact [Normal Outer Ear/Nose] : the outer ears and nose were normal in appearance [No JVD] : no jugular venous distention [No Respiratory Distress] : no respiratory distress  [No Accessory Muscle Use] : no accessory muscle use [Normal Rate] : normal rate  [Regular Rhythm] : with a regular rhythm [Normal S1, S2] : normal S1 and S2 [No Carotid Bruits] : no carotid bruits [No Edema] : there was no peripheral edema [Declined Breast Exam] : declined breast exam  [Soft] : abdomen soft [Non Tender] : non-tender [Non-distended] : non-distended [No Masses] : no abdominal mass palpated [No HSM] : no HSM [Normal Bowel Sounds] : normal bowel sounds [Normal Posterior Cervical Nodes] : no posterior cervical lymphadenopathy [Normal Anterior Cervical Nodes] : no anterior cervical lymphadenopathy [No CVA Tenderness] : no CVA  tenderness [Speech Grossly Normal] : speech grossly normal [Normal Affect] : the affect was normal [Normal Mood] : the mood was normal [Normal Insight/Judgement] : insight and judgment were intact [de-identified] : +ve wheeze SUNDAY and RLL  [de-identified] : decreased strength R hip secondary to OA   [de-identified] : altered gait  secondary to R hip OA

## 2023-02-22 ENCOUNTER — RX RENEWAL (OUTPATIENT)
Age: 68
End: 2023-02-22

## 2023-03-02 ENCOUNTER — APPOINTMENT (OUTPATIENT)
Dept: FAMILY MEDICINE | Facility: CLINIC | Age: 68
End: 2023-03-02
Payer: MEDICARE

## 2023-03-02 VITALS
WEIGHT: 145 LBS | BODY MASS INDEX: 28.47 KG/M2 | HEART RATE: 74 BPM | HEIGHT: 60 IN | DIASTOLIC BLOOD PRESSURE: 80 MMHG | OXYGEN SATURATION: 98 % | SYSTOLIC BLOOD PRESSURE: 124 MMHG | TEMPERATURE: 98.2 F

## 2023-03-02 DIAGNOSIS — R05.3 CHRONIC COUGH: ICD-10-CM

## 2023-03-02 DIAGNOSIS — R14.0 ABDOMINAL DISTENSION (GASEOUS): ICD-10-CM

## 2023-03-02 PROCEDURE — 99214 OFFICE O/P EST MOD 30 MIN: CPT

## 2023-03-02 NOTE — PHYSICAL EXAM
[No Acute Distress] : no acute distress [Well Nourished] : well nourished [Well Developed] : well developed [Well-Appearing] : well-appearing [EOMI] : extraocular movements intact [Normal Outer Ear/Nose] : the outer ears and nose were normal in appearance [No JVD] : no jugular venous distention [No Respiratory Distress] : no respiratory distress  [No Accessory Muscle Use] : no accessory muscle use [Clear to Auscultation] : lungs were clear to auscultation bilaterally [Normal Rate] : normal rate  [Regular Rhythm] : with a regular rhythm [Normal S1, S2] : normal S1 and S2 [No Carotid Bruits] : no carotid bruits [No Edema] : there was no peripheral edema [Declined Breast Exam] : declined breast exam  [Soft] : abdomen soft [Non-distended] : non-distended [No Masses] : no abdominal mass palpated [No HSM] : no HSM [Normal Bowel Sounds] : normal bowel sounds [Normal Posterior Cervical Nodes] : no posterior cervical lymphadenopathy [Normal Anterior Cervical Nodes] : no anterior cervical lymphadenopathy [No CVA Tenderness] : no CVA  tenderness [Speech Grossly Normal] : speech grossly normal [Normal Affect] : the affect was normal [Normal Mood] : the mood was normal [Normal Insight/Judgement] : insight and judgment were intact [de-identified] : mild lower abdominal discomfort to palpation  [de-identified] : decreased strength R hip secondary to OA   [de-identified] : altered gait  secondary to R hip OA

## 2023-03-02 NOTE — HISTORY OF PRESENT ILLNESS
[FreeTextEntry1] : Follow up  [de-identified] : 67 y female for  eval of B/L lower abdominal discomfort.   +ve bloating and gas  Denies loose stools  +ve  daily BM.  PHx of Diverticulitis  \par pt reports similar symptoms \par Denies bloody/black stools \par no f/c/s  \par

## 2023-03-02 NOTE — PLAN
[FreeTextEntry1] : LOW residue diet \par CLOSE MONITORING!!!!\par see med orders  \par \par if fever develops or abd pain acutely worsens pt instructed to call office immediately \par \par see med orders\par \par cont'd Ortho f/u for probable R THR

## 2023-04-10 ENCOUNTER — APPOINTMENT (OUTPATIENT)
Dept: FAMILY MEDICINE | Facility: CLINIC | Age: 68
End: 2023-04-10
Payer: MEDICARE

## 2023-04-10 VITALS
WEIGHT: 142 LBS | TEMPERATURE: 97.2 F | OXYGEN SATURATION: 99 % | BODY MASS INDEX: 27.88 KG/M2 | SYSTOLIC BLOOD PRESSURE: 114 MMHG | HEART RATE: 69 BPM | DIASTOLIC BLOOD PRESSURE: 68 MMHG | HEIGHT: 60 IN

## 2023-04-10 DIAGNOSIS — Z87.19 PERSONAL HISTORY OF OTHER DISEASES OF THE DIGESTIVE SYSTEM: ICD-10-CM

## 2023-04-10 PROCEDURE — 99214 OFFICE O/P EST MOD 30 MIN: CPT

## 2023-04-10 RX ORDER — CIPROFLOXACIN HYDROCHLORIDE 250 MG/1
250 TABLET, FILM COATED ORAL
Qty: 14 | Refills: 0 | Status: COMPLETED | COMMUNITY
Start: 2023-03-02 | End: 2023-04-10

## 2023-04-10 NOTE — PHYSICAL EXAM
[No Acute Distress] : no acute distress [Well Nourished] : well nourished [Well Developed] : well developed [Well-Appearing] : well-appearing [EOMI] : extraocular movements intact [Normal Outer Ear/Nose] : the outer ears and nose were normal in appearance [No JVD] : no jugular venous distention [No Respiratory Distress] : no respiratory distress  [No Accessory Muscle Use] : no accessory muscle use [Clear to Auscultation] : lungs were clear to auscultation bilaterally [Normal Rate] : normal rate  [Regular Rhythm] : with a regular rhythm [Normal S1, S2] : normal S1 and S2 [No Carotid Bruits] : no carotid bruits [No Edema] : there was no peripheral edema [Declined Breast Exam] : declined breast exam  [Soft] : abdomen soft [No Masses] : no abdominal mass palpated [Non-distended] : non-distended [No HSM] : no HSM [Normal Bowel Sounds] : normal bowel sounds [Normal Posterior Cervical Nodes] : no posterior cervical lymphadenopathy [Normal Anterior Cervical Nodes] : no anterior cervical lymphadenopathy [No CVA Tenderness] : no CVA  tenderness [Speech Grossly Normal] : speech grossly normal [Normal Affect] : the affect was normal [Normal Mood] : the mood was normal [Normal Insight/Judgement] : insight and judgment were intact [de-identified] : mild lower abdominal discomfort to palpation  [de-identified] : decreased strength R hip secondary to OA   [de-identified] : altered gait  secondary to R hip OA

## 2023-04-10 NOTE — PLAN
[FreeTextEntry1] : see radiology orders \par \par see med orders  \par \par cont CLonazepam 1mg prn

## 2023-04-10 NOTE — HISTORY OF PRESENT ILLNESS
[FreeTextEntry1] : MARI is a 67 year female here for Follow Up \par \par  [de-identified] : 66 yo female for f/u on KATHERINE/OA of R hip   \par \par as above pt states current medication regimen manages condition well.  \par \par pt is 1M post increase of LT4 from 125mcg qd to 137mcg qd.   NO change in energy NO Change in sleep habits \par erratic BM's persist

## 2023-04-18 ENCOUNTER — APPOINTMENT (OUTPATIENT)
Dept: ORTHOPEDIC SURGERY | Facility: CLINIC | Age: 68
End: 2023-04-18
Payer: MEDICARE

## 2023-04-18 VITALS — BODY MASS INDEX: 27.88 KG/M2 | HEIGHT: 60 IN | WEIGHT: 142 LBS

## 2023-04-18 PROCEDURE — 73503 X-RAY EXAM HIP UNI 4/> VIEWS: CPT | Mod: RT

## 2023-04-18 PROCEDURE — 99204 OFFICE O/P NEW MOD 45 MIN: CPT

## 2023-04-18 NOTE — DISCUSSION/SUMMARY
[de-identified] : The natural progression of Osteoarthritis was explained to the patient.  We discussed the possible treatment options from conservative to operative.  These included NSAIDS, Glucosamine and Chondrotin sulfate, and Physical Therapy as well different types of injections.  We also discussed that at some point they may progress to needed a LILIA.  Information and pamphlets were given when appropriate.\par \par Patient Complains of pain in Hip with a level that often reaches greater than a 8/10. The Pain has been progressively worsening of his/her treatment coarse. The pain has interfered with their ADLs and worsens with weight bearing. On exam pain worsens with ROM passive and active and I measured a limited ROM.\par X- rays were reviewed with the patient and they show joint space narrowing, subchondral sclerosis, osteophyte formation, and subchondral cysts.\par After a period of more than 12 weeks physical therapy or exercise program done with me or another treating physician they have continued pain. The patient has failed a trial of NSAID medication or pain relieves if they were unable to tolerate NSAID medications. After a long discussion with the patient both the patient and I have decided we have exhausted all forms of less radical treatments and they would like to proceed with Total Hip Replacement. \par \par We discussed my findings and the natural history of their condition.  We talked about the details of the proposed surgery and the recovery.  We discussed the material risks, possible benefits and alternatives to surgery.  The risks include but are not limited to infection, bleeding and possible need for blood transfusion, fracture, bowel blockage, bladder retention or infection, need for reoperation, stiffness and/or limited range of motion, possible damage to nerves and blood vessels, failure of fixation of components, risk of deep vein thromboses and pulmonary embolism, wound healing problems, dislocation, and possible leg length discrepancy.  Although incredibly rare, we also discussed the risks of a cardiac event, stroke and even death during, or following, the surgery.  We discussed the type of implants the patient will be receiving and the type of fixation that will be used, as well as whether a robot or computer navigation aide will be used.  The patient understands they will need medical clearance and will attend a preoperative joint education class.  We also discussed the type of anesthesia they will receive, and the risks associated with hospital or rehab length of stay, obesity, diabetes and smoking.\par

## 2023-04-18 NOTE — ASSESSMENT
[FreeTextEntry1] : 4/18/23: Mod/sev OA Right Hip on xrays today - outside MRI reviewed - Hx of Lumbar fusion and osteopenia. Due to hx of spine surgery will use bimentum cup. Will benefit from Right LILIA - does not do well with oxycodone, morphine, hydrocodone; states she does do well with diladid failed PT, NSAIDs pain affecting ADLs - does not want inj at this point =

## 2023-04-18 NOTE — HISTORY OF PRESENT ILLNESS
[8] : 8 [Dull/Aching] : dull/aching [de-identified] : 4/18/23: Patient is a 66 yo female c/o right hip for 1 year with no specific injury. Taking advil as needed for pain. Occasional n/t down leg. Pain is worse with walking, stairs and getting up from seated position. No previous injuries or surgeries to right hip. Has seen another ortho who ordered MRI (Dr. Slade). [] : no [FreeTextEntry1] : R Hip  [FreeTextEntry5] : Araceli is a 67 year F here for an evaluation of the R Hip. a year ago in January the Hip was causing pain. NO injury. Pt takes advil. Pt did an MRI done and needs a hip replacement done. Pt had a bone density test done.  [de-identified] : MRI

## 2023-04-18 NOTE — PHYSICAL EXAM
[5___] : extension 5[unfilled]/5 [2+] : posterior tibialis pulse: 2+ [Right] : right hip with pelvis [Moderate arthritis (Tonnis Grade 2)] : Moderate arthritis (Tonnis Grade 2) [] : no erythema

## 2023-05-30 ENCOUNTER — OUTPATIENT (OUTPATIENT)
Dept: OUTPATIENT SERVICES | Facility: HOSPITAL | Age: 68
LOS: 1 days | Discharge: HOME | End: 2023-05-30
Payer: MEDICARE

## 2023-05-30 VITALS
DIASTOLIC BLOOD PRESSURE: 60 MMHG | TEMPERATURE: 97 F | HEART RATE: 75 BPM | RESPIRATION RATE: 17 BRPM | WEIGHT: 138.89 LBS | SYSTOLIC BLOOD PRESSURE: 125 MMHG | OXYGEN SATURATION: 98 % | HEIGHT: 60 IN

## 2023-05-30 DIAGNOSIS — Z01.818 ENCOUNTER FOR OTHER PREPROCEDURAL EXAMINATION: ICD-10-CM

## 2023-05-30 DIAGNOSIS — Z01.812 ENCOUNTER FOR PREPROCEDURAL LABORATORY EXAMINATION: ICD-10-CM

## 2023-05-30 DIAGNOSIS — M16.11 UNILATERAL PRIMARY OSTEOARTHRITIS, RIGHT HIP: ICD-10-CM

## 2023-05-30 DIAGNOSIS — Z98.1 ARTHRODESIS STATUS: Chronic | ICD-10-CM

## 2023-05-30 LAB
A1C WITH ESTIMATED AVERAGE GLUCOSE RESULT: 5.5 % — SIGNIFICANT CHANGE UP (ref 4–5.6)
ALBUMIN SERPL ELPH-MCNC: 3.6 G/DL — SIGNIFICANT CHANGE UP (ref 3.3–5)
ALP SERPL-CCNC: 65 U/L — SIGNIFICANT CHANGE UP (ref 40–120)
ALT FLD-CCNC: 19 U/L — SIGNIFICANT CHANGE UP (ref 12–78)
ANION GAP SERPL CALC-SCNC: 1 MMOL/L — LOW (ref 5–17)
APTT BLD: 32.7 SEC — SIGNIFICANT CHANGE UP (ref 27.5–35.5)
AST SERPL-CCNC: 13 U/L — LOW (ref 15–37)
BASOPHILS # BLD AUTO: 0.04 K/UL — SIGNIFICANT CHANGE UP (ref 0–0.2)
BASOPHILS NFR BLD AUTO: 0.4 % — SIGNIFICANT CHANGE UP (ref 0–2)
BILIRUB SERPL-MCNC: 0.2 MG/DL — SIGNIFICANT CHANGE UP (ref 0.2–1.2)
BUN SERPL-MCNC: 16 MG/DL — SIGNIFICANT CHANGE UP (ref 7–23)
CALCIUM SERPL-MCNC: 9.4 MG/DL — SIGNIFICANT CHANGE UP (ref 8.5–10.1)
CHLORIDE SERPL-SCNC: 113 MMOL/L — HIGH (ref 96–108)
CO2 SERPL-SCNC: 29 MMOL/L — SIGNIFICANT CHANGE UP (ref 22–31)
CREAT SERPL-MCNC: 0.57 MG/DL — SIGNIFICANT CHANGE UP (ref 0.5–1.3)
EGFR: 100 ML/MIN/1.73M2 — SIGNIFICANT CHANGE UP
EOSINOPHIL # BLD AUTO: 0.06 K/UL — SIGNIFICANT CHANGE UP (ref 0–0.5)
EOSINOPHIL NFR BLD AUTO: 0.5 % — SIGNIFICANT CHANGE UP (ref 0–6)
ESTIMATED AVERAGE GLUCOSE: 111 MG/DL — SIGNIFICANT CHANGE UP (ref 68–114)
GLUCOSE SERPL-MCNC: 81 MG/DL — SIGNIFICANT CHANGE UP (ref 70–99)
HCT VFR BLD CALC: 39.4 % — SIGNIFICANT CHANGE UP (ref 34.5–45)
HGB BLD-MCNC: 13.1 G/DL — SIGNIFICANT CHANGE UP (ref 11.5–15.5)
IMM GRANULOCYTES NFR BLD AUTO: 0.4 % — SIGNIFICANT CHANGE UP (ref 0–0.9)
INR BLD: 0.93 RATIO — SIGNIFICANT CHANGE UP (ref 0.88–1.16)
LYMPHOCYTES # BLD AUTO: 1.39 K/UL — SIGNIFICANT CHANGE UP (ref 1–3.3)
LYMPHOCYTES # BLD AUTO: 12.3 % — LOW (ref 13–44)
MCHC RBC-ENTMCNC: 30 PG — SIGNIFICANT CHANGE UP (ref 27–34)
MCHC RBC-ENTMCNC: 33.2 G/DL — SIGNIFICANT CHANGE UP (ref 32–36)
MCV RBC AUTO: 90.4 FL — SIGNIFICANT CHANGE UP (ref 80–100)
MONOCYTES # BLD AUTO: 0.35 K/UL — SIGNIFICANT CHANGE UP (ref 0–0.9)
MONOCYTES NFR BLD AUTO: 3.1 % — SIGNIFICANT CHANGE UP (ref 2–14)
MRSA PCR RESULT.: SIGNIFICANT CHANGE UP
NEUTROPHILS # BLD AUTO: 9.41 K/UL — HIGH (ref 1.8–7.4)
NEUTROPHILS NFR BLD AUTO: 83.3 % — HIGH (ref 43–77)
NRBC # BLD: 0 /100 WBCS — SIGNIFICANT CHANGE UP (ref 0–0)
PLATELET # BLD AUTO: 232 K/UL — SIGNIFICANT CHANGE UP (ref 150–400)
POTASSIUM SERPL-MCNC: 4 MMOL/L — SIGNIFICANT CHANGE UP (ref 3.5–5.3)
POTASSIUM SERPL-SCNC: 4 MMOL/L — SIGNIFICANT CHANGE UP (ref 3.5–5.3)
PROT SERPL-MCNC: 7.5 GM/DL — SIGNIFICANT CHANGE UP (ref 6–8.3)
PROTHROM AB SERPL-ACNC: 11.1 SEC — SIGNIFICANT CHANGE UP (ref 10.5–13.4)
RBC # BLD: 4.36 M/UL — SIGNIFICANT CHANGE UP (ref 3.8–5.2)
RBC # FLD: 13.2 % — SIGNIFICANT CHANGE UP (ref 10.3–14.5)
S AUREUS DNA NOSE QL NAA+PROBE: SIGNIFICANT CHANGE UP
SODIUM SERPL-SCNC: 143 MMOL/L — SIGNIFICANT CHANGE UP (ref 135–145)
VIT D25+D1,25 OH+D1,25 PNL SERPL-MCNC: 73.9 PG/ML — SIGNIFICANT CHANGE UP (ref 19.9–79.3)
WBC # BLD: 11.29 K/UL — HIGH (ref 3.8–10.5)
WBC # FLD AUTO: 11.29 K/UL — HIGH (ref 3.8–10.5)

## 2023-05-30 PROCEDURE — 93010 ELECTROCARDIOGRAM REPORT: CPT

## 2023-05-30 RX ORDER — LEVOTHYROXINE SODIUM 125 MCG
1 TABLET ORAL
Refills: 0 | DISCHARGE

## 2023-05-30 RX ORDER — LEVOTHYROXINE SODIUM 125 MCG
1 TABLET ORAL
Qty: 0 | Refills: 0 | DISCHARGE

## 2023-05-30 NOTE — OCCUPATIONAL THERAPY INITIAL EVALUATION ADULT - PERTINENT HX OF CURRENT PROBLEM, REHAB EVAL
R hip OA which impacts pts ability to perform functional tasks/transfers and mobility. Pt is scheduled for R THR on 6/14/23.

## 2023-05-30 NOTE — OCCUPATIONAL THERAPY INITIAL EVALUATION ADULT - ADDITIONAL COMMENTS
Pt lives with spouse (Who can assist post op) in a private house with 2 steps to enter with no rails. Once inside, the pt reports that she plans to stay on the main floor when entering. The pts bathroom has a tub/shower combination, fixed/retractable shower head, standard toilet seat and grab bars +. The pt reports that she has a 3/1 commode and a shower chair at home. The pt ambulates with no device inside of the home and uses a SAC only outside. The pt reports that she has a rolling walker and crutches at home. The pt daily pain is a 7/10 at rest and a 10/10 with movement. The pt manages the pain with rest and Advil. The pt reports that she is allergic to Codeine and Morphine. The pt recently had outpatient PT, no recent falls and has buckling of the leg. The pt wears glasses all the time, R handed, does not drive and has no hearing impairments.

## 2023-05-30 NOTE — H&P PST ADULT - MUSCULOSKELETAL
details… normal/ROM intact/decreased ROM due to pain/normal gait/strength 5/5 bilateral upper extremities/strength 5/5 bilateral lower extremities

## 2023-05-30 NOTE — H&P PST ADULT - NSICDXFAMILYHX_GEN_ALL_CORE_FT
FAMILY HISTORY:  Father  Still living? No  FH: emphysema, Age at diagnosis: Age Unknown  FH: heart attack, Age at diagnosis: Age Unknown

## 2023-05-30 NOTE — H&P PST ADULT - NSANTHOSAYNRD_GEN_A_CORE
No. FLO screening performed.  STOP BANG Legend: 0-2 = LOW Risk; 3-4 = INTERMEDIATE Risk; 5-8 = HIGH Risk

## 2023-05-30 NOTE — H&P PST ADULT - PROBLEM SELECTOR PLAN 1
right total hip replacement.  Labs - CBC, BMP, PT/PTT, T&S, nose culture and EKG done   Medical eval advised  Preop 3 day antibacterial wash  instruction reviewed and given, MRSA and MSSA screening done today.  Avoid NSAID and OTC supplements for 7 days  Continue all prescribed meds as instructed  NPO after 11pm the day before surgery. Take medicines as advised the morning of surgery with sips of water. Ensure given and explained protocol  Vaccinated for covid.   verbalized understanding of all instructions.

## 2023-05-30 NOTE — H&P PST ADULT - ASSESSMENT
osteoarthritis right hip osteoarthritis right hip  CAPRINI SCORE [CLOT]    AGE RELATED RISK FACTORS                                                       MOBILITY RELATED FACTORS  [ ] Age 41-60 years                                            (1 Point)                  [ ] Bed rest                                                        (1 Point)  [x ] Age: 61-74 years                                           (2 Points)                 [ ] Plaster cast                                                   (2 Points)  [ ] Age= 75 years                                              (3 Points)                 [ ] Bed bound for more than 72 hours                 (2 Points)    DISEASE RELATED RISK FACTORS                                               GENDER SPECIFIC FACTORS  [ ] Edema in the lower extremities                       (1 Point)                  [ ] Pregnancy                                                     (1 Point)  [ ] Varicose veins                                               (1 Point)                  [ ] Post-partum < 6 weeks                                   (1 Point)             [x ] BMI > 25 Kg/m2                                            (1 Point)                  [ ] Hormonal therapy  or oral contraception          (1 Point)                 [ ] Sepsis (in the previous month)                        (1 Point)                  [ ] History of pregnancy complications                 (1 point)  [ ] Pneumonia or serious lung disease                                               [ ] Unexplained or recurrent                     (1 Point)           (in the previous month)                               (1 Point)  [ ] Abnormal pulmonary function test                     (1 Point)                 SURGERY RELATED RISK FACTORS  [ ] Acute myocardial infarction                              (1 Point)                 [ ]  Section                                             (1 Point)  [ ] Congestive heart failure (in the previous month)  (1 Point)               [ ] Minor surgery                                                  (1 Point)   [ ] Inflammatory bowel disease                             (1 Point)                 [ ] Arthroscopic surgery                                        (2 Points)  [ ] Central venous access                                      (2 Points)                [ ] General surgery lasting more than 45 minutes   (2 Points)       [ ] Stroke (in the previous month)                          (5 Points)               [x ] Elective arthroplasty                                         (5 Points)                                                                                                                                               HEMATOLOGY RELATED FACTORS                                                 TRAUMA RELATED RISK FACTORS  [ ] Prior episodes of VTE                                     (3 Points)                [ ] Fracture of the hip, pelvis, or leg                       (5 Points)  [ ] Positive family history for VTE                         (3 Points)                 [ ] Acute spinal cord injury (in the previous month)  (5 Points)  [ ] Prothrombin 90118 A                                     (3 Points)                 [ ] Paralysis  (less than 1 month)                             (5 Points)  [ ] Factor V Leiden                                             (3 Points)                  [ ] Multiple Trauma within 1 month                        (5 Points)  [ ] Lupus anticoagulants                                     (3 Points)                                                           [ ] Anticardiolipin antibodies                               (3 Points)                                                       [ ] High homocysteine in the blood                      (3 Points)                                             [ ] Other congenital or acquired thrombophilia      (3 Points)                                                [ ] Heparin induced thrombocytopenia                  (3 Points)                                          Total Score [   7       ]    Caprini Score 0 - 2:  Low Risk, No VTE Prophylaxis required for most patients, encourage ambulation  Caprini Score 3 - 6:  At Risk, pharmacologic VTE prophylaxis is indicated for most patients (in the absence of a contraindication)  Caprini Score Greater than or = 7:  High Risk, pharmacologic VTE prophylaxis is indicated for most patients (in the absence of a contraindication)

## 2023-05-30 NOTE — H&P PST ADULT - HISTORY OF PRESENT ILLNESS
68 y/o female, PMH of hypothyroidism, anxiety, fibromyalgia with c/o of riogth hip pain. Seen by Dr Pruitt, scheduled for right total hip replacement on 6/14/23. Pre op testing today.   68 y/o female, PMH of hypothyroidism, anxiety, fibromyalgia with c/o of rigth hip pain for a yr. Seen by Dr Pruitt, scheduled for right total hip replacement on 6/14/23. Pre op testing today.  Goal : to walk without pain

## 2023-06-02 ENCOUNTER — APPOINTMENT (OUTPATIENT)
Dept: FAMILY MEDICINE | Facility: CLINIC | Age: 68
End: 2023-06-02
Payer: MEDICARE

## 2023-06-02 VITALS
HEIGHT: 60 IN | WEIGHT: 140 LBS | HEART RATE: 62 BPM | OXYGEN SATURATION: 98 % | SYSTOLIC BLOOD PRESSURE: 102 MMHG | BODY MASS INDEX: 27.48 KG/M2 | DIASTOLIC BLOOD PRESSURE: 70 MMHG

## 2023-06-02 DIAGNOSIS — D72.829 ELEVATED WHITE BLOOD CELL COUNT, UNSPECIFIED: ICD-10-CM

## 2023-06-02 DIAGNOSIS — M16.11 UNILATERAL PRIMARY OSTEOARTHRITIS, RIGHT HIP: ICD-10-CM

## 2023-06-02 PROCEDURE — 99214 OFFICE O/P EST MOD 30 MIN: CPT | Mod: 25

## 2023-06-02 PROCEDURE — 36415 COLL VENOUS BLD VENIPUNCTURE: CPT

## 2023-06-02 RX ORDER — LEVOTHYROXINE SODIUM 0.14 MG/1
137 TABLET ORAL
Qty: 90 | Refills: 3 | Status: COMPLETED | COMMUNITY
Start: 2017-09-27 | End: 2023-06-02

## 2023-06-02 RX ORDER — PANTOPRAZOLE 40 MG/1
40 TABLET, DELAYED RELEASE ORAL DAILY
Qty: 30 | Refills: 2 | Status: COMPLETED | COMMUNITY
Start: 2022-10-14 | End: 2023-06-02

## 2023-06-05 NOTE — ED PROVIDER NOTE - CPE EDP SKIN NORM
Printed form for Completion. Will call Pt Mom  when form is complete for P/U  Swapna Silverio MA     normal...

## 2023-06-07 LAB
APPEARANCE: CLEAR
BACTERIA UR CULT: NORMAL
BACTERIA: NEGATIVE /HPF
BILIRUBIN URINE: NEGATIVE
BLOOD URINE: NEGATIVE
CAST: 1 /LPF
COLOR: YELLOW
EPITHELIAL CELLS: 1 /HPF
GLUCOSE QUALITATIVE U: NEGATIVE MG/DL
KETONES URINE: NEGATIVE MG/DL
LEUKOCYTE ESTERASE URINE: NEGATIVE
MICROSCOPIC-UA: NORMAL
NITRITE URINE: NEGATIVE
PH URINE: 6
PROTEIN URINE: NEGATIVE MG/DL
RED BLOOD CELLS URINE: 1 /HPF
SPECIFIC GRAVITY URINE: 1.02
UROBILINOGEN URINE: 0.2 MG/DL
WHITE BLOOD CELLS URINE: 1 /HPF

## 2023-06-07 NOTE — PHYSICAL EXAM
[No Acute Distress] : no acute distress [Well Nourished] : well nourished [Well Developed] : well developed [Well-Appearing] : well-appearing [PERRL] : pupils equal round and reactive to light [EOMI] : extraocular movements intact [Normal Outer Ear/Nose] : the outer ears and nose were normal in appearance [No JVD] : no jugular venous distention [No Respiratory Distress] : no respiratory distress  [No Accessory Muscle Use] : no accessory muscle use [Clear to Auscultation] : lungs were clear to auscultation bilaterally [Normal Rate] : normal rate  [Regular Rhythm] : with a regular rhythm [Normal S1, S2] : normal S1 and S2 [No Carotid Bruits] : no carotid bruits [No Edema] : there was no peripheral edema [Non-distended] : non-distended [No CVA Tenderness] : no CVA  tenderness [Grossly Normal Strength/Tone] : grossly normal strength/tone [No Rash] : no rash [Coordination Grossly Intact] : coordination grossly intact [No Focal Deficits] : no focal deficits [Speech Grossly Normal] : speech grossly normal [Normal Affect] : the affect was normal [Normal Mood] : the mood was normal [Normal Insight/Judgement] : insight and judgment were intact [de-identified] : altered gait secondary to R hip pain

## 2023-06-07 NOTE — HISTORY OF PRESENT ILLNESS
[No Pertinent Cardiac History] : no history of aortic stenosis, atrial fibrillation, coronary artery disease, recent myocardial infarction, or implantable device/pacemaker [Asthma] : asthma [(Patient denies any chest pain, claudication, dyspnea on exertion, orthopnea, palpitations or syncope)] : Patient denies any chest pain, claudication, dyspnea on exertion, orthopnea, palpitations or syncope [Aortic Stenosis] : no aortic stenosis [Atrial Fibrillation] : no atrial fibrillation [Coronary Artery Disease] : no coronary artery disease [Recent Myocardial Infarction] : no recent myocardial infarction [Implantable Device/Pacemaker] : no implantable device/pacemaker [COPD] : no COPD [Sleep Apnea] : no sleep apnea [Smoker] : not a smoker [FreeTextEntry1] : Right Hip Replacement  [FreeTextEntry2] : 06/14/2023 [FreeTextEntry3] : Dr. Hilton Pruitt [FreeTextEntry4] : 66 yo female for medical clearance.    Scheduled for R THR on 6/14/23 c Dr. Pruitt at [facility]    \par \par as above no CP/SOB c activity no dizziness no palpitations  \par no N/V/D  +BM qd-bid  NL no bloody/black stools  \par intermittent chronic urinary discomfort secondary to known B/L  renal calculi \par \par ***pt reports hypersensitivity to anesthesia in past***\par ****+ve latex and shellfish allergy****\par NO hx of Blood Transfusion \par ABO Blood Type= A positive \par NO dentures \par \par (F) [phone]\par      [phone]  [FreeTextEntry5] : Former Smoker    5/13/22          3-4 cigs/day x 20 years

## 2023-06-07 NOTE — ASSESSMENT
[As per surgery] : as per surgery [FreeTextEntry4] : Medically optimized for proposed procedure pending PST labs and Cardiology clearance.  \par \par Addendum Note:   Medically optimized for proposed procedure.   Cardiac clearance c Dr. Valero.  [phone]

## 2023-06-13 ENCOUNTER — TRANSCRIPTION ENCOUNTER (OUTPATIENT)
Age: 68
End: 2023-06-13

## 2023-06-14 ENCOUNTER — TRANSCRIPTION ENCOUNTER (OUTPATIENT)
Age: 68
End: 2023-06-14

## 2023-06-14 ENCOUNTER — APPOINTMENT (OUTPATIENT)
Dept: ORTHOPEDIC SURGERY | Facility: HOSPITAL | Age: 68
End: 2023-06-14
Payer: MEDICARE

## 2023-06-14 ENCOUNTER — OUTPATIENT (OUTPATIENT)
Dept: OUTPATIENT SERVICES | Facility: HOSPITAL | Age: 68
LOS: 1 days | Discharge: HOME | End: 2023-06-14

## 2023-06-14 ENCOUNTER — RESULT REVIEW (OUTPATIENT)
Age: 68
End: 2023-06-14

## 2023-06-14 DIAGNOSIS — Z98.1 ARTHRODESIS STATUS: Chronic | ICD-10-CM

## 2023-06-14 PROCEDURE — 27130 TOTAL HIP ARTHROPLASTY: CPT | Mod: RT

## 2023-06-14 PROCEDURE — 27130 TOTAL HIP ARTHROPLASTY: CPT | Mod: AS,RT

## 2023-06-15 ENCOUNTER — TRANSCRIPTION ENCOUNTER (OUTPATIENT)
Age: 68
End: 2023-06-15

## 2023-06-15 RX ORDER — MONTELUKAST 4 MG/1
1 TABLET, CHEWABLE ORAL
Qty: 0 | Refills: 0 | DISCHARGE

## 2023-06-15 RX ORDER — ALBUTEROL 90 UG/1
1 AEROSOL, METERED ORAL
Qty: 0 | Refills: 0 | DISCHARGE

## 2023-06-15 RX ORDER — BUDESONIDE AND FORMOTEROL FUMARATE DIHYDRATE 160; 4.5 UG/1; UG/1
2 AEROSOL RESPIRATORY (INHALATION)
Qty: 0 | Refills: 0 | DISCHARGE

## 2023-06-15 RX ORDER — LEVOTHYROXINE SODIUM 125 MCG
1 TABLET ORAL
Refills: 0 | DISCHARGE

## 2023-06-22 ENCOUNTER — APPOINTMENT (OUTPATIENT)
Dept: FAMILY MEDICINE | Facility: CLINIC | Age: 68
End: 2023-06-22
Payer: MEDICARE

## 2023-06-22 VITALS
DIASTOLIC BLOOD PRESSURE: 80 MMHG | OXYGEN SATURATION: 96 % | HEART RATE: 100 BPM | BODY MASS INDEX: 27.48 KG/M2 | HEIGHT: 60 IN | WEIGHT: 140 LBS | SYSTOLIC BLOOD PRESSURE: 126 MMHG

## 2023-06-22 DIAGNOSIS — Z87.891 PERSONAL HISTORY OF NICOTINE DEPENDENCE: ICD-10-CM

## 2023-06-22 DIAGNOSIS — J44.9 CHRONIC OBSTRUCTIVE PULMONARY DISEASE, UNSPECIFIED: ICD-10-CM

## 2023-06-22 DIAGNOSIS — E03.9 HYPOTHYROIDISM, UNSPECIFIED: ICD-10-CM

## 2023-06-22 DIAGNOSIS — Z88.6 ALLERGY STATUS TO ANALGESIC AGENT: ICD-10-CM

## 2023-06-22 DIAGNOSIS — Z88.5 ALLERGY STATUS TO NARCOTIC AGENT: ICD-10-CM

## 2023-06-22 DIAGNOSIS — E66.9 OBESITY, UNSPECIFIED: ICD-10-CM

## 2023-06-22 DIAGNOSIS — M16.11 UNILATERAL PRIMARY OSTEOARTHRITIS, RIGHT HIP: ICD-10-CM

## 2023-06-22 DIAGNOSIS — Z91.040 LATEX ALLERGY STATUS: ICD-10-CM

## 2023-06-22 DIAGNOSIS — Z88.0 ALLERGY STATUS TO PENICILLIN: ICD-10-CM

## 2023-06-22 PROCEDURE — 99214 OFFICE O/P EST MOD 30 MIN: CPT

## 2023-06-22 NOTE — PLAN
[FreeTextEntry1] : pt reports non-compliance c Rx'ed 81mg ASA,  stressed to pt NEED to  take daily 81mg ASA for DVT/PE prevention\par pt understands and agrees with above recommendation   \par \par increase hydration!!!!!  \par Recommend Tylenol Arthritis 650mg 2 tabs q8h prn for pain   (max daily dose 6 tabs) \par \par pt refusing corticosteroid therapy \par \par pt make take 2 OTC Advil 3x/day for breakthrough pain

## 2023-06-22 NOTE — REVIEW OF SYSTEMS
[Negative] : Heme/Lymph [FreeTextEntry2] : see HPI [FreeTextEntry4] : see HPI  [FreeTextEntry9] : see HPI

## 2023-06-22 NOTE — HISTORY OF PRESENT ILLNESS
[FreeTextEntry1] : follow up on HLD/hypothyroidism  [de-identified] : 66 yo female s/p R THR on 6/14/23 c  Dr. Pruitt  @ "[facility]" presents for post op f/u.   pt  has yet to have surgical post op f/u (scheduled 6/27/23).  \par pt reports daily home nurse.  initial visit 6/16/23  \par pt states developed hives on 6/16/23.    Celebrex d/c'ed 2n2 allergic reaction.    ( Rx'ed Dilaudid post op prn for pain,  Protonix, 81mg ASA, and Celebrex )\par \par Denies fever \par pt states 3 days ago "cold" developed   \par +ve chills/sweats  \par  +ve nausea ,  dry heaves ,   relieved  c OTC Pepcid    pt states Protonix is ineffective \par improving  dry cough  \par +ve clear nasal d/c  \par pt denies chest tightness, but reports pain R hip RLE \par \par Denies rectal bleeding denies mekhi hematuria,  no hematemesis,  no hemoptysis  \par Denies productive cough \par Denies urinary complaints \par \par

## 2023-06-22 NOTE — PHYSICAL EXAM
[Well Nourished] : well nourished [EOMI] : extraocular movements intact [Normal Outer Ear/Nose] : the outer ears and nose were normal in appearance [No JVD] : no jugular venous distention [No Respiratory Distress] : no respiratory distress  [No Accessory Muscle Use] : no accessory muscle use [Clear to Auscultation] : lungs were clear to auscultation bilaterally [Normal Rate] : normal rate  [Regular Rhythm] : with a regular rhythm [Normal S1, S2] : normal S1 and S2 [Non-distended] : non-distended [Coordination Grossly Intact] : coordination grossly intact [Speech Grossly Normal] : speech grossly normal [Normal Affect] : the affect was normal [Normal Mood] : the mood was normal [Normal Insight/Judgement] : insight and judgment were intact [de-identified] : mild distress 2n2 pain  [de-identified] : decreased ROM 2n2 pain R hip  [de-identified] : large linear incisional site R  ant/lateral hip  c/d/i  [de-identified] : ambulates c aid of  walker

## 2023-06-27 ENCOUNTER — APPOINTMENT (OUTPATIENT)
Dept: ORTHOPEDIC SURGERY | Facility: CLINIC | Age: 68
End: 2023-06-27
Payer: MEDICARE

## 2023-06-27 VITALS — HEIGHT: 60 IN | WEIGHT: 140 LBS | BODY MASS INDEX: 27.48 KG/M2

## 2023-06-27 PROCEDURE — 73503 X-RAY EXAM HIP UNI 4/> VIEWS: CPT | Mod: RT

## 2023-06-27 PROCEDURE — 99024 POSTOP FOLLOW-UP VISIT: CPT

## 2023-06-27 NOTE — ASSESSMENT
[FreeTextEntry1] : 4/18/23: Mod/sev OA Right Hip on xrays today - outside MRI reviewed - Hx of Lumbar fusion and osteopenia. Due to hx of spine surgery will use bimentum cup. Will benefit from Right LILIA - does not do well with oxycodone, morphine, hydrocodone; states she does do well with diladid failed PT, NSAIDs pain affecting ADLs - does not want inj at this point = \par \par 6/27/23: Nearly 2wks postop R LILIA. She is doing well today and happy with progress. Begin outpatient PT. All questions and concerns were addressed. Follow up in 6 weeks and repeat XR. Recommended she take TUMS with the aspirin and take with a full stomach

## 2023-06-27 NOTE — DISCUSSION/SUMMARY
[de-identified] : The incision was inspected and was clean and dry with no drainage.  The patient was instructed to call for fevers, chills, wound drainage, wound opening, redness, or any other concerns.\par \par The patient is doing well at this time. The patient will be started on a course of physical therapy. I recommended that the patient works on range of motion at home and was shown how to do this. I encouraged the patient to increase ambulation. The patient can continue to take Tylenol for occasional discomfort. The patient was advised not to do any dental work for the first three months following the surgery. We will see the patient  back for a follow-up for a repeat evaluation. The patient will call or return earlier for any questions or concerns.  Signs and symptoms of infection reviewed and patient advised to call immediately for redness, fevers, and/or chills.\par \par  Progress note completed by Charisse Concepcion PA-C

## 2023-06-27 NOTE — PHYSICAL EXAM
[de-identified] : RIGHT HIP\par Incision is clean and dry with no drainage - dressing removed -\par Sensation intact\par Motor 5/5 to LE with some weakness to hip flexor\par +1 edema LE\par \par Xray 3 views Right hip/pelvis - Implants good position and well fixed - no fracture or dislocation and Leg length wnl

## 2023-06-27 NOTE — HISTORY OF PRESENT ILLNESS
[Dull/Aching] : dull/aching [] : Post Surgical Visit: yes [de-identified] : 6/27/23: 13 days s/p R LILIA. She notes pain is better than prior to surgery. Amb with walker. Not taking oxy. States she has some stomach issues with the aspirin, but she is taking it alongside Pepcid \par \par Previous doc: \par 4/18/23: Patient is a 68 yo female c/o right hip for 1 year with no specific injury. Taking advil as needed for pain. Occasional n/t down leg. Pain is worse with walking, stairs and getting up from seated position. No previous injuries or surgeries to right hip. Has seen another ortho who ordered MRI (Dr. Slade). [de-identified] : doing home PT  [de-identified] : 6/14/23 [de-identified] : PAXTON RODRIGUES

## 2023-08-08 ENCOUNTER — APPOINTMENT (OUTPATIENT)
Dept: ORTHOPEDIC SURGERY | Facility: CLINIC | Age: 68
End: 2023-08-08
Payer: MEDICARE

## 2023-08-08 VITALS — WEIGHT: 140 LBS | BODY MASS INDEX: 27.48 KG/M2 | HEIGHT: 60 IN

## 2023-08-08 PROCEDURE — 73503 X-RAY EXAM HIP UNI 4/> VIEWS: CPT | Mod: RT

## 2023-08-08 PROCEDURE — 99024 POSTOP FOLLOW-UP VISIT: CPT

## 2023-08-08 NOTE — HISTORY OF PRESENT ILLNESS
[5] : 5 [3] : 3 [Dull/Aching] : dull/aching [] : Post Surgical Visit: yes [de-identified] : 8/8/23: PO #2 7 weeks R LILIA. Pt still having pain with walking and physical activity. Pain in groin. Currently in PT- feels she is making progress but slow. Pt stopped using walker and now using cane to ambulate. Taking advil for pain.   Previous doc:  4/18/23: Patient is a 66 yo female c/o right hip for 1 year with no specific injury. Taking advil as needed for pain. Occasional n/t down leg. Pain is worse with walking, stairs and getting up from seated position. No previous injuries or surgeries to right hip. Has seen another ortho who ordered MRI (Dr. Slade). 6/27/23: 13 days s/p R LILIA. She notes pain is better than prior to surgery. Amb with walker. Not taking oxy. States she has some stomach issues with the aspirin, but she is taking it alongside Pepcid  [FreeTextEntry6] : numbness [de-identified] : doing PT

## 2023-08-08 NOTE — PHYSICAL EXAM
[de-identified] : RIGHT HIP\par  Incision is clean and dry with no drainage - dressing removed -\par  Sensation intact\par  Motor 5/5 to LE with some weakness to hip flexor\par  +1 edema LE\par  \par  Xray 3 views Right hip/pelvis - Implants good position and well fixed - no fracture or dislocation and Leg length wnl

## 2023-08-08 NOTE — ASSESSMENT
[FreeTextEntry1] : 4/18/23: Mod/sev OA Right Hip on xrays today - outside MRI reviewed - Hx of Lumbar fusion and osteopenia. Due to hx of spine surgery will use bimentum cup. Will benefit from Right LILIA - does not do well with oxycodone, morphine, hydrocodone; states she does do well with diladid failed PT, NSAIDs pain affecting ADLs - does not want inj at this point =  6/27/23: Nearly 2wks postop R LILIA. She is doing well today and happy with progress. Begin outpatient PT. All questions and concerns were addressed. Follow up in 6 weeks and repeat XR. Recommended she take TUMS with the aspirin and take with a full stomach   8/8/23: PO #2 7 weeks s/p R LILIA. Recovering slowly. Pt in still significant amt of pain on exam and with ambulation. She does have improvement overall and is feeling better than prior to surgery. Pt c/o constipation- likely residual symptoms from meds administered during surgery, but is improving. Denied anti-inflam today. Will cont to monitor. Continue with PT. Follow up in 6 weeks.

## 2023-08-08 NOTE — DISCUSSION/SUMMARY
[de-identified] : The incision was inspected and was clean and dry with no drainage.  The patient was instructed to call for fevers, chills, wound drainage, wound opening, redness, or any other concerns.  The patient is doing well at this time. The patient will be started on a course of physical therapy. I recommended that the patient works on range of motion at home and was shown how to do this. I encouraged the patient to increase ambulation. The patient can continue to take Tylenol for occasional discomfort. The patient was advised not to do any dental work for the first three months following the surgery. We will see the patient  back for a follow-up for a repeat evaluation. The patient will call or return earlier for any questions or concerns.  Signs and symptoms of infection reviewed and patient advised to call immediately for redness, fevers, and/or chills.   Entered by Corrine Langford acting as a scribe.

## 2023-09-08 ENCOUNTER — APPOINTMENT (OUTPATIENT)
Dept: FAMILY MEDICINE | Facility: CLINIC | Age: 68
End: 2023-09-08
Payer: MEDICARE

## 2023-09-08 PROCEDURE — 90471 IMMUNIZATION ADMIN: CPT

## 2023-09-08 PROCEDURE — 90686 IIV4 VACC NO PRSV 0.5 ML IM: CPT

## 2023-09-12 ENCOUNTER — RX RENEWAL (OUTPATIENT)
Age: 68
End: 2023-09-12

## 2023-09-19 ENCOUNTER — APPOINTMENT (OUTPATIENT)
Dept: ORTHOPEDIC SURGERY | Facility: CLINIC | Age: 68
End: 2023-09-19
Payer: MEDICARE

## 2023-09-19 VITALS — BODY MASS INDEX: 27.48 KG/M2 | WEIGHT: 140 LBS | HEIGHT: 60 IN

## 2023-09-19 PROCEDURE — 99213 OFFICE O/P EST LOW 20 MIN: CPT

## 2023-10-12 ENCOUNTER — MED ADMIN CHARGE (OUTPATIENT)
Age: 68
End: 2023-10-12

## 2023-10-23 ENCOUNTER — RX RENEWAL (OUTPATIENT)
Age: 68
End: 2023-10-23

## 2023-11-17 ENCOUNTER — APPOINTMENT (OUTPATIENT)
Dept: ORTHOPEDIC SURGERY | Facility: CLINIC | Age: 68
End: 2023-11-17
Payer: MEDICARE

## 2023-11-17 ENCOUNTER — RESULT CHARGE (OUTPATIENT)
Age: 68
End: 2023-11-17

## 2023-11-17 VITALS — HEIGHT: 60 IN | WEIGHT: 140 LBS | BODY MASS INDEX: 27.48 KG/M2

## 2023-11-17 PROCEDURE — 20611 DRAIN/INJ JOINT/BURSA W/US: CPT | Mod: RT

## 2023-11-17 PROCEDURE — 99214 OFFICE O/P EST MOD 30 MIN: CPT | Mod: 25

## 2023-11-17 PROCEDURE — 73502 X-RAY EXAM HIP UNI 2-3 VIEWS: CPT

## 2023-11-17 PROCEDURE — 73562 X-RAY EXAM OF KNEE 3: CPT | Mod: RT

## 2023-11-17 PROCEDURE — J3490M: CUSTOM

## 2023-11-28 ENCOUNTER — APPOINTMENT (OUTPATIENT)
Dept: ORTHOPEDIC SURGERY | Facility: CLINIC | Age: 68
End: 2023-11-28

## 2023-12-07 RX ORDER — BUDESONIDE AND FORMOTEROL FUMARATE DIHYDRATE 160; 4.5 UG/1; UG/1
160-4.5 AEROSOL RESPIRATORY (INHALATION)
Qty: 1 | Refills: 3 | Status: ACTIVE | COMMUNITY
Start: 2018-06-29 | End: 1900-01-01

## 2023-12-11 ENCOUNTER — APPOINTMENT (OUTPATIENT)
Dept: OBGYN | Facility: CLINIC | Age: 68
End: 2023-12-11

## 2023-12-11 ENCOUNTER — APPOINTMENT (OUTPATIENT)
Dept: FAMILY MEDICINE | Facility: CLINIC | Age: 68
End: 2023-12-11

## 2023-12-18 ENCOUNTER — APPOINTMENT (OUTPATIENT)
Dept: FAMILY MEDICINE | Facility: CLINIC | Age: 68
End: 2023-12-18
Payer: MEDICARE

## 2023-12-18 DIAGNOSIS — R19.5 OTHER FECAL ABNORMALITIES: ICD-10-CM

## 2023-12-18 DIAGNOSIS — R11.0 NAUSEA: ICD-10-CM

## 2023-12-18 DIAGNOSIS — R10.9 UNSPECIFIED ABDOMINAL PAIN: ICD-10-CM

## 2023-12-18 PROCEDURE — 99213 OFFICE O/P EST LOW 20 MIN: CPT | Mod: 25,95

## 2023-12-18 RX ORDER — BENZONATATE 100 MG/1
100 CAPSULE ORAL
Qty: 270 | Refills: 0 | Status: COMPLETED | COMMUNITY
Start: 2022-10-04 | End: 2023-12-18

## 2023-12-18 NOTE — HISTORY OF PRESENT ILLNESS
[FreeTextEntry8] : Verbal consent obtained  from pt  for TEB visit    67 yo female for f/u on LLQ abdominal tenderness and L lower back pain 3 days post start of Cipro 500mg bid x 10d  and Metronidazole  500mg tid.        Denies f/c/s  +ve mild nausea  no vomiting no +ve soft stools   Denies mucoid or bloody stools  no black stools  no urinary complaints at this time   +ve compliance c low fiber diet

## 2023-12-18 NOTE — PHYSICAL EXAM
[No Acute Distress] : no acute distress [EOMI] : extraocular movements intact [Normal Outer Ear/Nose] : the outer ears and nose were normal in appearance [No JVD] : no jugular venous distention [No Respiratory Distress] : no respiratory distress  [No Accessory Muscle Use] : no accessory muscle use [Coordination Grossly Intact] : coordination grossly intact [Speech Grossly Normal] : speech grossly normal [Normal Affect] : the affect was normal [Normal Mood] : the mood was normal [Normal Insight/Judgement] : insight and judgment were intact

## 2023-12-18 NOTE — PLAN
[FreeTextEntry1] : cont abx as Rx'ed  monitor for fever, chills, and/or diarrhea.  IF aforementioned symptoms develop recommend ED evaluation for CT scan of Abd/Pelvis c contrast   cont LOW  fiber det  cont increased hydration!!!!!

## 2023-12-25 ENCOUNTER — INPATIENT (INPATIENT)
Facility: HOSPITAL | Age: 68
LOS: 2 days | Discharge: HOME | DRG: 640 | End: 2023-12-28
Attending: GENERAL PRACTICE | Admitting: INTERNAL MEDICINE
Payer: MEDICARE

## 2023-12-25 VITALS
OXYGEN SATURATION: 96 % | SYSTOLIC BLOOD PRESSURE: 134 MMHG | HEART RATE: 91 BPM | WEIGHT: 132.28 LBS | HEIGHT: 62 IN | RESPIRATION RATE: 16 BRPM | DIASTOLIC BLOOD PRESSURE: 70 MMHG | TEMPERATURE: 99 F

## 2023-12-25 DIAGNOSIS — Z98.1 ARTHRODESIS STATUS: Chronic | ICD-10-CM

## 2023-12-25 LAB
ALBUMIN SERPL ELPH-MCNC: 3.6 G/DL — SIGNIFICANT CHANGE UP (ref 3.3–5)
ALBUMIN SERPL ELPH-MCNC: 3.6 G/DL — SIGNIFICANT CHANGE UP (ref 3.3–5)
ALP SERPL-CCNC: 68 U/L — SIGNIFICANT CHANGE UP (ref 40–120)
ALP SERPL-CCNC: 68 U/L — SIGNIFICANT CHANGE UP (ref 40–120)
ALT FLD-CCNC: 35 U/L — SIGNIFICANT CHANGE UP (ref 12–78)
ALT FLD-CCNC: 35 U/L — SIGNIFICANT CHANGE UP (ref 12–78)
ANION GAP SERPL CALC-SCNC: 7 MMOL/L — SIGNIFICANT CHANGE UP (ref 5–17)
ANION GAP SERPL CALC-SCNC: 7 MMOL/L — SIGNIFICANT CHANGE UP (ref 5–17)
APPEARANCE UR: CLEAR — SIGNIFICANT CHANGE UP
APPEARANCE UR: CLEAR — SIGNIFICANT CHANGE UP
AST SERPL-CCNC: 24 U/L — SIGNIFICANT CHANGE UP (ref 15–37)
AST SERPL-CCNC: 24 U/L — SIGNIFICANT CHANGE UP (ref 15–37)
BASOPHILS # BLD AUTO: 0.03 K/UL — SIGNIFICANT CHANGE UP (ref 0–0.2)
BASOPHILS # BLD AUTO: 0.03 K/UL — SIGNIFICANT CHANGE UP (ref 0–0.2)
BASOPHILS NFR BLD AUTO: 0.2 % — SIGNIFICANT CHANGE UP (ref 0–2)
BASOPHILS NFR BLD AUTO: 0.2 % — SIGNIFICANT CHANGE UP (ref 0–2)
BILIRUB SERPL-MCNC: 0.2 MG/DL — SIGNIFICANT CHANGE UP (ref 0.2–1.2)
BILIRUB SERPL-MCNC: 0.2 MG/DL — SIGNIFICANT CHANGE UP (ref 0.2–1.2)
BILIRUB UR-MCNC: NEGATIVE — SIGNIFICANT CHANGE UP
BILIRUB UR-MCNC: NEGATIVE — SIGNIFICANT CHANGE UP
BUN SERPL-MCNC: 17 MG/DL — SIGNIFICANT CHANGE UP (ref 7–23)
BUN SERPL-MCNC: 17 MG/DL — SIGNIFICANT CHANGE UP (ref 7–23)
CALCIUM SERPL-MCNC: 9.2 MG/DL — SIGNIFICANT CHANGE UP (ref 8.5–10.1)
CALCIUM SERPL-MCNC: 9.2 MG/DL — SIGNIFICANT CHANGE UP (ref 8.5–10.1)
CHLORIDE SERPL-SCNC: 108 MMOL/L — SIGNIFICANT CHANGE UP (ref 96–108)
CHLORIDE SERPL-SCNC: 108 MMOL/L — SIGNIFICANT CHANGE UP (ref 96–108)
CO2 SERPL-SCNC: 27 MMOL/L — SIGNIFICANT CHANGE UP (ref 22–31)
CO2 SERPL-SCNC: 27 MMOL/L — SIGNIFICANT CHANGE UP (ref 22–31)
COLOR SPEC: YELLOW — SIGNIFICANT CHANGE UP
COLOR SPEC: YELLOW — SIGNIFICANT CHANGE UP
CREAT SERPL-MCNC: 0.65 MG/DL — SIGNIFICANT CHANGE UP (ref 0.5–1.3)
CREAT SERPL-MCNC: 0.65 MG/DL — SIGNIFICANT CHANGE UP (ref 0.5–1.3)
DIFF PNL FLD: ABNORMAL
DIFF PNL FLD: ABNORMAL
EGFR: 96 ML/MIN/1.73M2 — SIGNIFICANT CHANGE UP
EGFR: 96 ML/MIN/1.73M2 — SIGNIFICANT CHANGE UP
EOSINOPHIL # BLD AUTO: 0.09 K/UL — SIGNIFICANT CHANGE UP (ref 0–0.5)
EOSINOPHIL # BLD AUTO: 0.09 K/UL — SIGNIFICANT CHANGE UP (ref 0–0.5)
EOSINOPHIL NFR BLD AUTO: 0.6 % — SIGNIFICANT CHANGE UP (ref 0–6)
EOSINOPHIL NFR BLD AUTO: 0.6 % — SIGNIFICANT CHANGE UP (ref 0–6)
GLUCOSE SERPL-MCNC: 99 MG/DL — SIGNIFICANT CHANGE UP (ref 70–99)
GLUCOSE SERPL-MCNC: 99 MG/DL — SIGNIFICANT CHANGE UP (ref 70–99)
GLUCOSE UR QL: NEGATIVE MG/DL — SIGNIFICANT CHANGE UP
GLUCOSE UR QL: NEGATIVE MG/DL — SIGNIFICANT CHANGE UP
HCT VFR BLD CALC: 39.8 % — SIGNIFICANT CHANGE UP (ref 34.5–45)
HCT VFR BLD CALC: 39.8 % — SIGNIFICANT CHANGE UP (ref 34.5–45)
HGB BLD-MCNC: 13.4 G/DL — SIGNIFICANT CHANGE UP (ref 11.5–15.5)
HGB BLD-MCNC: 13.4 G/DL — SIGNIFICANT CHANGE UP (ref 11.5–15.5)
IMM GRANULOCYTES NFR BLD AUTO: 0.3 % — SIGNIFICANT CHANGE UP (ref 0–0.9)
IMM GRANULOCYTES NFR BLD AUTO: 0.3 % — SIGNIFICANT CHANGE UP (ref 0–0.9)
KETONES UR-MCNC: 40 MG/DL
KETONES UR-MCNC: 40 MG/DL
LEUKOCYTE ESTERASE UR-ACNC: ABNORMAL
LEUKOCYTE ESTERASE UR-ACNC: ABNORMAL
LIDOCAIN IGE QN: 15 U/L — SIGNIFICANT CHANGE UP (ref 13–75)
LIDOCAIN IGE QN: 15 U/L — SIGNIFICANT CHANGE UP (ref 13–75)
LYMPHOCYTES # BLD AUTO: 1.96 K/UL — SIGNIFICANT CHANGE UP (ref 1–3.3)
LYMPHOCYTES # BLD AUTO: 1.96 K/UL — SIGNIFICANT CHANGE UP (ref 1–3.3)
LYMPHOCYTES # BLD AUTO: 13.6 % — SIGNIFICANT CHANGE UP (ref 13–44)
LYMPHOCYTES # BLD AUTO: 13.6 % — SIGNIFICANT CHANGE UP (ref 13–44)
MCHC RBC-ENTMCNC: 29.6 PG — SIGNIFICANT CHANGE UP (ref 27–34)
MCHC RBC-ENTMCNC: 29.6 PG — SIGNIFICANT CHANGE UP (ref 27–34)
MCHC RBC-ENTMCNC: 33.7 GM/DL — SIGNIFICANT CHANGE UP (ref 32–36)
MCHC RBC-ENTMCNC: 33.7 GM/DL — SIGNIFICANT CHANGE UP (ref 32–36)
MCV RBC AUTO: 88.1 FL — SIGNIFICANT CHANGE UP (ref 80–100)
MCV RBC AUTO: 88.1 FL — SIGNIFICANT CHANGE UP (ref 80–100)
MONOCYTES # BLD AUTO: 0.53 K/UL — SIGNIFICANT CHANGE UP (ref 0–0.9)
MONOCYTES # BLD AUTO: 0.53 K/UL — SIGNIFICANT CHANGE UP (ref 0–0.9)
MONOCYTES NFR BLD AUTO: 3.7 % — SIGNIFICANT CHANGE UP (ref 2–14)
MONOCYTES NFR BLD AUTO: 3.7 % — SIGNIFICANT CHANGE UP (ref 2–14)
NEUTROPHILS # BLD AUTO: 11.71 K/UL — HIGH (ref 1.8–7.4)
NEUTROPHILS # BLD AUTO: 11.71 K/UL — HIGH (ref 1.8–7.4)
NEUTROPHILS NFR BLD AUTO: 81.6 % — HIGH (ref 43–77)
NEUTROPHILS NFR BLD AUTO: 81.6 % — HIGH (ref 43–77)
NITRITE UR-MCNC: NEGATIVE — SIGNIFICANT CHANGE UP
NITRITE UR-MCNC: NEGATIVE — SIGNIFICANT CHANGE UP
NRBC # BLD: 0 /100 WBCS — SIGNIFICANT CHANGE UP (ref 0–0)
NRBC # BLD: 0 /100 WBCS — SIGNIFICANT CHANGE UP (ref 0–0)
PH UR: 5.5 — SIGNIFICANT CHANGE UP (ref 5–8)
PH UR: 5.5 — SIGNIFICANT CHANGE UP (ref 5–8)
PLATELET # BLD AUTO: 219 K/UL — SIGNIFICANT CHANGE UP (ref 150–400)
PLATELET # BLD AUTO: 219 K/UL — SIGNIFICANT CHANGE UP (ref 150–400)
POTASSIUM SERPL-MCNC: 2.8 MMOL/L — CRITICAL LOW (ref 3.5–5.3)
POTASSIUM SERPL-MCNC: 2.8 MMOL/L — CRITICAL LOW (ref 3.5–5.3)
POTASSIUM SERPL-SCNC: 2.8 MMOL/L — CRITICAL LOW (ref 3.5–5.3)
POTASSIUM SERPL-SCNC: 2.8 MMOL/L — CRITICAL LOW (ref 3.5–5.3)
PROT SERPL-MCNC: 8.4 G/DL — HIGH (ref 6–8.3)
PROT SERPL-MCNC: 8.4 G/DL — HIGH (ref 6–8.3)
PROT UR-MCNC: SIGNIFICANT CHANGE UP MG/DL
PROT UR-MCNC: SIGNIFICANT CHANGE UP MG/DL
RBC # BLD: 4.52 M/UL — SIGNIFICANT CHANGE UP (ref 3.8–5.2)
RBC # BLD: 4.52 M/UL — SIGNIFICANT CHANGE UP (ref 3.8–5.2)
RBC # FLD: 15 % — HIGH (ref 10.3–14.5)
RBC # FLD: 15 % — HIGH (ref 10.3–14.5)
SODIUM SERPL-SCNC: 142 MMOL/L — SIGNIFICANT CHANGE UP (ref 135–145)
SODIUM SERPL-SCNC: 142 MMOL/L — SIGNIFICANT CHANGE UP (ref 135–145)
SP GR SPEC: 1.02 — SIGNIFICANT CHANGE UP (ref 1–1.03)
SP GR SPEC: 1.02 — SIGNIFICANT CHANGE UP (ref 1–1.03)
UROBILINOGEN FLD QL: 0.2 MG/DL — SIGNIFICANT CHANGE UP (ref 0.2–1)
UROBILINOGEN FLD QL: 0.2 MG/DL — SIGNIFICANT CHANGE UP (ref 0.2–1)
WBC # BLD: 14.36 K/UL — HIGH (ref 3.8–10.5)
WBC # BLD: 14.36 K/UL — HIGH (ref 3.8–10.5)
WBC # FLD AUTO: 14.36 K/UL — HIGH (ref 3.8–10.5)
WBC # FLD AUTO: 14.36 K/UL — HIGH (ref 3.8–10.5)

## 2023-12-25 PROCEDURE — 99285 EMERGENCY DEPT VISIT HI MDM: CPT

## 2023-12-25 RX ORDER — SODIUM CHLORIDE 9 MG/ML
1000 INJECTION INTRAMUSCULAR; INTRAVENOUS; SUBCUTANEOUS ONCE
Refills: 0 | Status: COMPLETED | OUTPATIENT
Start: 2023-12-25 | End: 2023-12-25

## 2023-12-25 RX ORDER — ACETAMINOPHEN 500 MG
1000 TABLET ORAL ONCE
Refills: 0 | Status: COMPLETED | OUTPATIENT
Start: 2023-12-25 | End: 2023-12-25

## 2023-12-25 RX ORDER — KETOROLAC TROMETHAMINE 30 MG/ML
15 SYRINGE (ML) INJECTION ONCE
Refills: 0 | Status: DISCONTINUED | OUTPATIENT
Start: 2023-12-25 | End: 2023-12-25

## 2023-12-25 RX ORDER — POTASSIUM CHLORIDE 20 MEQ
40 PACKET (EA) ORAL ONCE
Refills: 0 | Status: COMPLETED | OUTPATIENT
Start: 2023-12-25 | End: 2023-12-25

## 2023-12-25 RX ORDER — POTASSIUM CHLORIDE 20 MEQ
10 PACKET (EA) ORAL
Refills: 0 | Status: COMPLETED | OUTPATIENT
Start: 2023-12-25 | End: 2023-12-26

## 2023-12-25 RX ORDER — ONDANSETRON 8 MG/1
4 TABLET, FILM COATED ORAL ONCE
Refills: 0 | Status: COMPLETED | OUTPATIENT
Start: 2023-12-25 | End: 2023-12-25

## 2023-12-25 RX ADMIN — ONDANSETRON 4 MILLIGRAM(S): 8 TABLET, FILM COATED ORAL at 21:45

## 2023-12-25 RX ADMIN — SODIUM CHLORIDE 1000 MILLILITER(S): 9 INJECTION INTRAMUSCULAR; INTRAVENOUS; SUBCUTANEOUS at 21:45

## 2023-12-25 RX ADMIN — Medication 100 MILLIEQUIVALENT(S): at 23:51

## 2023-12-25 RX ADMIN — Medication 15 MILLIGRAM(S): at 23:24

## 2023-12-25 NOTE — ED PROVIDER NOTE - PHYSICAL EXAMINATION

## 2023-12-25 NOTE — ED ADULT NURSE NOTE - OBJECTIVE STATEMENT
Patient to ER due to abdominal pain.  IV access established.  Fluids started.  Anti nauseas meds given.  Blood sent to lab.

## 2023-12-25 NOTE — ED PROVIDER NOTE - ATTENDING APP SHARED VISIT CONTRIBUTION OF CARE
Paco Murphy MD (Attending Physician):    I performed a history and physical exam of the patient and discussed their management with the KELTON. I have reviewed the KELTON note and agree with the documented findings and plan of care, except as noted. This was a shared visit with an KELTON. I reviewed and verified the documentation and independently performed my own history/exam/medical decision making. My medical decision making and observations are found below. Please refer to any progress notes for updates on clinical course.    HPI:  68-year-old female with past medical history of kidney stones and diverticulitis presents today complaining of lower abdominal pain.  Patient reports that she was in [place] in which she was diagnosed with gastroenteritis.  Patient was advised if her symptoms get worse to go back to the ER.  Patient reports having diarrhea and dark stools.  Patient admits to vomiting.  Patient describes lower abdominal pain as cramping, and currently 10 out of 10.  Patient denies dysuria, hematuria, melena, hematochezia, hematemesis, or any other complaints.    PE:  GEN - +In moderate discomfort, A&Ox3  HEAD - NC/AT  EYES - PERRL, EOMI  ENT - Airway patent, +mucous membranes dry  PULMONARY - CTA b/l, symmetric breath sounds, no W/R/R  CARDIAC - +S1S2, RRR, no M/G/R, no JVD  ABDOMEN - +BS, ND, +TTP diffusely (worst in b/l lower abd), soft, no guarding, no rebound, no masses, no rigidity   - +Right CVA TTP  EXTREMITIES - FROM, symmetric pulses, no edema  SKIN - No rash or bruising  NEUROLOGIC - Alert, speech clear, no focal deficits  PSYCH - Normal mood/affect, normal insight    MDM:  DDx includes, but not limited to: viral syndrome, appendicitis, diverticulitis, pancreatitis, kidney stone, UTI, dehydration, electrolyte derangement. ekg, CT a/p, labs, urine, zofran, pain control, IVF. Dispo pending w/u.

## 2023-12-25 NOTE — ED PROVIDER NOTE - CARE PLAN
1 Principal Discharge DX:	Pneumonia  Secondary Diagnosis:	Urinary tract infection  Secondary Diagnosis:	Viral syndrome  Secondary Diagnosis:	Hypokalemia

## 2023-12-25 NOTE — ED ADULT NURSE NOTE - NSFALLUNIVINTERV_ED_ALL_ED
Bed/Stretcher in lowest position, wheels locked, appropriate side rails in place/Call bell, personal items and telephone in reach/Instruct patient to call for assistance before getting out of bed/chair/stretcher/Non-slip footwear applied when patient is off stretcher/[place] to call system/Physically safe environment - no spills, clutter or unnecessary equipment/Purposeful proactive rounding/Room/bathroom lighting operational, light cord in reach Bed/Stretcher in lowest position, wheels locked, appropriate side rails in place/Call bell, personal items and telephone in reach/Instruct patient to call for assistance before getting out of bed/chair/stretcher/Non-slip footwear applied when patient is off stretcher/[place] to call system/Physically safe environment - no spills, clutter or unnecessary equipment/Purposeful proactive rounding/Room/bathroom lighting operational, light cord in reach

## 2023-12-25 NOTE — ED PROVIDER NOTE - OBJECTIVE STATEMENT
68-year-old female with past medical history of kidney stones and diverticulitis presents today complaining of lower abdominal pain.  Patient reports that she was in [place] in which she was diagnosed with gastroenteritis.  Patient was advised if her symptoms get worse to go back to the ER.  Patient reports having diarrhea and dark stools.  Patient admits to vomiting.  Patient describes lower abdominal pain as cramping, and currently 10 out of 10.  Patient denies dysuria, hematuria, melena, hematochezia, hematemesis, or any other complaints.

## 2023-12-25 NOTE — ED ADULT NURSE NOTE - NSFALLASSESSNEED_ED_ALL_ED
Telephone Encounter by Breonna Kruger at 08/16/17 09:56 AM     Author:  Breonna Kruger Service:  (none) Author Type:  Patient      Filed:  08/16/17 09:58 AM Encounter Date:  8/16/2017 Status:  Signed     :  Breonna Kruger (Patient )            Epic order dated 08/15/17 for chronic knee pain          Time frame: Next available          Called and left message for patient to return call to office to schedule an appointment with our orthopedic department. 1st attempt[MM1.1M]      Revision History        User Key Date/Time User Provider Type Action    > MM1.1 08/16/17 09:58 AM Breonna Kruger Patient  Sign    M - Manual            
Telephone Encounter by Isidra Mares at 08/16/17 10:04 AM     Author:  Isidra Mares Service:  (none) Author Type:       Filed:  08/16/17 10:04 AM Encounter Date:  8/16/2017 Status:  Signed     :  Isidra Mares ()            Received call from patient returning call on message below, appointment booked with Dr. Ralph for 8/24 at [facility].  Close encounter.[CC1.1M]       Revision History        User Key Date/Time User Provider Type Action    > CC1.1 08/16/17 10:04 AM Isidra Mares  Sign    M - Manual            
no

## 2023-12-25 NOTE — ED PROVIDER NOTE - PROGRESS NOTE DETAILS
Paco Murphy MD (Attending Physician): CT a/p happens to show incidental small patchy opacities in the right lower lobe suspicious for pneumonia. Pt continues to have severe abd pain despite getting opioid pain medications. Will be admitted for PNA, UTI, and likely viral gastroenteritis.

## 2023-12-25 NOTE — ED PROVIDER NOTE - NS ED ATTENDING STATEMENT MOD
This was a shared visit with the KELTON. I reviewed and verified the documentation and independently performed the documented:

## 2023-12-26 DIAGNOSIS — N39.0 URINARY TRACT INFECTION, SITE NOT SPECIFIED: ICD-10-CM

## 2023-12-26 DIAGNOSIS — N20.0 CALCULUS OF KIDNEY: ICD-10-CM

## 2023-12-26 DIAGNOSIS — E03.9 HYPOTHYROIDISM, UNSPECIFIED: ICD-10-CM

## 2023-12-26 DIAGNOSIS — J45.909 UNSPECIFIED ASTHMA, UNCOMPLICATED: ICD-10-CM

## 2023-12-26 DIAGNOSIS — R19.7 DIARRHEA, UNSPECIFIED: ICD-10-CM

## 2023-12-26 DIAGNOSIS — F41.9 ANXIETY DISORDER, UNSPECIFIED: ICD-10-CM

## 2023-12-26 DIAGNOSIS — E87.6 HYPOKALEMIA: ICD-10-CM

## 2023-12-26 DIAGNOSIS — J18.0 BRONCHOPNEUMONIA, UNSPECIFIED ORGANISM: ICD-10-CM

## 2023-12-26 DIAGNOSIS — Z29.9 ENCOUNTER FOR PROPHYLACTIC MEASURES, UNSPECIFIED: ICD-10-CM

## 2023-12-26 DIAGNOSIS — J18.9 PNEUMONIA, UNSPECIFIED ORGANISM: ICD-10-CM

## 2023-12-26 DIAGNOSIS — K57.90 DIVERTICULOSIS OF INTESTINE, PART UNSPECIFIED, WITHOUT PERFORATION OR ABSCESS WITHOUT BLEEDING: ICD-10-CM

## 2023-12-26 LAB
ALBUMIN SERPL ELPH-MCNC: 2.9 G/DL — LOW (ref 3.3–5)
ALBUMIN SERPL ELPH-MCNC: 2.9 G/DL — LOW (ref 3.3–5)
ALP SERPL-CCNC: 61 U/L — SIGNIFICANT CHANGE UP (ref 40–120)
ALP SERPL-CCNC: 61 U/L — SIGNIFICANT CHANGE UP (ref 40–120)
ALT FLD-CCNC: 39 U/L — SIGNIFICANT CHANGE UP (ref 12–78)
ALT FLD-CCNC: 39 U/L — SIGNIFICANT CHANGE UP (ref 12–78)
ANION GAP SERPL CALC-SCNC: 5 MMOL/L — SIGNIFICANT CHANGE UP (ref 5–17)
ANION GAP SERPL CALC-SCNC: 5 MMOL/L — SIGNIFICANT CHANGE UP (ref 5–17)
AST SERPL-CCNC: 43 U/L — HIGH (ref 15–37)
AST SERPL-CCNC: 43 U/L — HIGH (ref 15–37)
BILIRUB SERPL-MCNC: 0.3 MG/DL — SIGNIFICANT CHANGE UP (ref 0.2–1.2)
BILIRUB SERPL-MCNC: 0.3 MG/DL — SIGNIFICANT CHANGE UP (ref 0.2–1.2)
BUN SERPL-MCNC: 15 MG/DL — SIGNIFICANT CHANGE UP (ref 7–23)
BUN SERPL-MCNC: 15 MG/DL — SIGNIFICANT CHANGE UP (ref 7–23)
CALCIUM SERPL-MCNC: 8.3 MG/DL — LOW (ref 8.5–10.1)
CALCIUM SERPL-MCNC: 8.3 MG/DL — LOW (ref 8.5–10.1)
CHLORIDE SERPL-SCNC: 112 MMOL/L — HIGH (ref 96–108)
CHLORIDE SERPL-SCNC: 112 MMOL/L — HIGH (ref 96–108)
CO2 SERPL-SCNC: 24 MMOL/L — SIGNIFICANT CHANGE UP (ref 22–31)
CO2 SERPL-SCNC: 24 MMOL/L — SIGNIFICANT CHANGE UP (ref 22–31)
CREAT SERPL-MCNC: 0.49 MG/DL — LOW (ref 0.5–1.3)
CREAT SERPL-MCNC: 0.49 MG/DL — LOW (ref 0.5–1.3)
EGFR: 103 ML/MIN/1.73M2 — SIGNIFICANT CHANGE UP
EGFR: 103 ML/MIN/1.73M2 — SIGNIFICANT CHANGE UP
FLUAV AG NPH QL: SIGNIFICANT CHANGE UP
FLUAV AG NPH QL: SIGNIFICANT CHANGE UP
FLUBV AG NPH QL: SIGNIFICANT CHANGE UP
FLUBV AG NPH QL: SIGNIFICANT CHANGE UP
GLUCOSE SERPL-MCNC: 107 MG/DL — HIGH (ref 70–99)
GLUCOSE SERPL-MCNC: 107 MG/DL — HIGH (ref 70–99)
HCT VFR BLD CALC: 34 % — LOW (ref 34.5–45)
HCT VFR BLD CALC: 34 % — LOW (ref 34.5–45)
HGB BLD-MCNC: 11.6 G/DL — SIGNIFICANT CHANGE UP (ref 11.5–15.5)
HGB BLD-MCNC: 11.6 G/DL — SIGNIFICANT CHANGE UP (ref 11.5–15.5)
LACTATE SERPL-SCNC: 1.3 MMOL/L — SIGNIFICANT CHANGE UP (ref 0.7–2)
LACTATE SERPL-SCNC: 1.3 MMOL/L — SIGNIFICANT CHANGE UP (ref 0.7–2)
MCHC RBC-ENTMCNC: 30.1 PG — SIGNIFICANT CHANGE UP (ref 27–34)
MCHC RBC-ENTMCNC: 30.1 PG — SIGNIFICANT CHANGE UP (ref 27–34)
MCHC RBC-ENTMCNC: 34.1 GM/DL — SIGNIFICANT CHANGE UP (ref 32–36)
MCHC RBC-ENTMCNC: 34.1 GM/DL — SIGNIFICANT CHANGE UP (ref 32–36)
MCV RBC AUTO: 88.1 FL — SIGNIFICANT CHANGE UP (ref 80–100)
MCV RBC AUTO: 88.1 FL — SIGNIFICANT CHANGE UP (ref 80–100)
NRBC # BLD: 0 /100 WBCS — SIGNIFICANT CHANGE UP (ref 0–0)
NRBC # BLD: 0 /100 WBCS — SIGNIFICANT CHANGE UP (ref 0–0)
PLATELET # BLD AUTO: 175 K/UL — SIGNIFICANT CHANGE UP (ref 150–400)
PLATELET # BLD AUTO: 175 K/UL — SIGNIFICANT CHANGE UP (ref 150–400)
POTASSIUM SERPL-MCNC: 3.4 MMOL/L — LOW (ref 3.5–5.3)
POTASSIUM SERPL-MCNC: 3.4 MMOL/L — LOW (ref 3.5–5.3)
POTASSIUM SERPL-SCNC: 3.4 MMOL/L — LOW (ref 3.5–5.3)
POTASSIUM SERPL-SCNC: 3.4 MMOL/L — LOW (ref 3.5–5.3)
PROT SERPL-MCNC: 7 G/DL — SIGNIFICANT CHANGE UP (ref 6–8.3)
PROT SERPL-MCNC: 7 G/DL — SIGNIFICANT CHANGE UP (ref 6–8.3)
RAPID RVP RESULT: SIGNIFICANT CHANGE UP
RAPID RVP RESULT: SIGNIFICANT CHANGE UP
RBC # BLD: 3.86 M/UL — SIGNIFICANT CHANGE UP (ref 3.8–5.2)
RBC # BLD: 3.86 M/UL — SIGNIFICANT CHANGE UP (ref 3.8–5.2)
RBC # FLD: 14.9 % — HIGH (ref 10.3–14.5)
RBC # FLD: 14.9 % — HIGH (ref 10.3–14.5)
RSV RNA NPH QL NAA+NON-PROBE: SIGNIFICANT CHANGE UP
RSV RNA NPH QL NAA+NON-PROBE: SIGNIFICANT CHANGE UP
SARS-COV-2 RNA SPEC QL NAA+PROBE: SIGNIFICANT CHANGE UP
SODIUM SERPL-SCNC: 141 MMOL/L — SIGNIFICANT CHANGE UP (ref 135–145)
SODIUM SERPL-SCNC: 141 MMOL/L — SIGNIFICANT CHANGE UP (ref 135–145)
WBC # BLD: 10.98 K/UL — HIGH (ref 3.8–10.5)
WBC # BLD: 10.98 K/UL — HIGH (ref 3.8–10.5)
WBC # FLD AUTO: 10.98 K/UL — HIGH (ref 3.8–10.5)
WBC # FLD AUTO: 10.98 K/UL — HIGH (ref 3.8–10.5)

## 2023-12-26 PROCEDURE — 99222 1ST HOSP IP/OBS MODERATE 55: CPT

## 2023-12-26 PROCEDURE — 71045 X-RAY EXAM CHEST 1 VIEW: CPT | Mod: 26

## 2023-12-26 PROCEDURE — 93010 ELECTROCARDIOGRAM REPORT: CPT

## 2023-12-26 PROCEDURE — 99222 1ST HOSP IP/OBS MODERATE 55: CPT | Mod: GC

## 2023-12-26 PROCEDURE — 74176 CT ABD & PELVIS W/O CONTRAST: CPT | Mod: 26,QQ

## 2023-12-26 RX ORDER — CEFTRIAXONE 500 MG/1
1000 INJECTION, POWDER, FOR SOLUTION INTRAMUSCULAR; INTRAVENOUS ONCE
Refills: 0 | Status: COMPLETED | OUTPATIENT
Start: 2023-12-26 | End: 2023-12-26

## 2023-12-26 RX ORDER — HYDROMORPHONE HYDROCHLORIDE 2 MG/ML
1 INJECTION INTRAMUSCULAR; INTRAVENOUS; SUBCUTANEOUS ONCE
Refills: 0 | Status: DISCONTINUED | OUTPATIENT
Start: 2023-12-26 | End: 2023-12-26

## 2023-12-26 RX ORDER — KETOROLAC TROMETHAMINE 30 MG/ML
15 SYRINGE (ML) INJECTION ONCE
Refills: 0 | Status: DISCONTINUED | OUTPATIENT
Start: 2023-12-26 | End: 2023-12-26

## 2023-12-26 RX ORDER — AZITHROMYCIN 500 MG/1
500 TABLET, FILM COATED ORAL ONCE
Refills: 0 | Status: COMPLETED | OUTPATIENT
Start: 2023-12-26 | End: 2023-12-26

## 2023-12-26 RX ORDER — HYDROMORPHONE HYDROCHLORIDE 2 MG/ML
0.5 INJECTION INTRAMUSCULAR; INTRAVENOUS; SUBCUTANEOUS EVERY 4 HOURS
Refills: 0 | Status: DISCONTINUED | OUTPATIENT
Start: 2023-12-26 | End: 2023-12-28

## 2023-12-26 RX ORDER — POTASSIUM CHLORIDE 20 MEQ
40 PACKET (EA) ORAL EVERY 4 HOURS
Refills: 0 | Status: COMPLETED | OUTPATIENT
Start: 2023-12-26 | End: 2023-12-26

## 2023-12-26 RX ORDER — CLONAZEPAM 1 MG
1 TABLET ORAL AT BEDTIME
Refills: 0 | Status: DISCONTINUED | OUTPATIENT
Start: 2023-12-26 | End: 2023-12-28

## 2023-12-26 RX ORDER — HYDROMORPHONE HYDROCHLORIDE 2 MG/ML
1 INJECTION INTRAMUSCULAR; INTRAVENOUS; SUBCUTANEOUS EVERY 4 HOURS
Refills: 0 | Status: DISCONTINUED | OUTPATIENT
Start: 2023-12-26 | End: 2023-12-28

## 2023-12-26 RX ORDER — SODIUM CHLORIDE 9 MG/ML
1000 INJECTION INTRAMUSCULAR; INTRAVENOUS; SUBCUTANEOUS
Refills: 0 | Status: DISCONTINUED | OUTPATIENT
Start: 2023-12-26 | End: 2023-12-28

## 2023-12-26 RX ORDER — CEFTRIAXONE 500 MG/1
1000 INJECTION, POWDER, FOR SOLUTION INTRAMUSCULAR; INTRAVENOUS EVERY 24 HOURS
Refills: 0 | Status: DISCONTINUED | OUTPATIENT
Start: 2023-12-27 | End: 2023-12-28

## 2023-12-26 RX ORDER — BUDESONIDE AND FORMOTEROL FUMARATE DIHYDRATE 160; 4.5 UG/1; UG/1
2 AEROSOL RESPIRATORY (INHALATION)
Refills: 0 | Status: DISCONTINUED | OUTPATIENT
Start: 2023-12-26 | End: 2023-12-28

## 2023-12-26 RX ORDER — ACETAMINOPHEN 500 MG
650 TABLET ORAL EVERY 6 HOURS
Refills: 0 | Status: DISCONTINUED | OUTPATIENT
Start: 2023-12-26 | End: 2023-12-28

## 2023-12-26 RX ORDER — LEVOTHYROXINE SODIUM 125 MCG
150 TABLET ORAL DAILY
Refills: 0 | Status: DISCONTINUED | OUTPATIENT
Start: 2023-12-26 | End: 2023-12-28

## 2023-12-26 RX ORDER — CLONAZEPAM 1 MG
1 TABLET ORAL
Qty: 0 | Refills: 0 | DISCHARGE

## 2023-12-26 RX ORDER — TAMSULOSIN HYDROCHLORIDE 0.4 MG/1
0.4 CAPSULE ORAL DAILY
Refills: 0 | Status: DISCONTINUED | OUTPATIENT
Start: 2023-12-26 | End: 2023-12-28

## 2023-12-26 RX ORDER — DIPHENHYDRAMINE HCL 50 MG
50 CAPSULE ORAL ONCE
Refills: 0 | Status: COMPLETED | OUTPATIENT
Start: 2023-12-27 | End: 2023-12-27

## 2023-12-26 RX ORDER — ENOXAPARIN SODIUM 100 MG/ML
40 INJECTION SUBCUTANEOUS EVERY 24 HOURS
Refills: 0 | Status: DISCONTINUED | OUTPATIENT
Start: 2023-12-26 | End: 2023-12-28

## 2023-12-26 RX ORDER — ONDANSETRON 8 MG/1
4 TABLET, FILM COATED ORAL EVERY 8 HOURS
Refills: 0 | Status: DISCONTINUED | OUTPATIENT
Start: 2023-12-26 | End: 2023-12-28

## 2023-12-26 RX ORDER — LANOLIN ALCOHOL/MO/W.PET/CERES
3 CREAM (GRAM) TOPICAL AT BEDTIME
Refills: 0 | Status: DISCONTINUED | OUTPATIENT
Start: 2023-12-26 | End: 2023-12-28

## 2023-12-26 RX ORDER — AZITHROMYCIN 500 MG/1
500 TABLET, FILM COATED ORAL EVERY 24 HOURS
Refills: 0 | Status: DISCONTINUED | OUTPATIENT
Start: 2023-12-27 | End: 2023-12-27

## 2023-12-26 RX ORDER — ALBUTEROL 90 UG/1
1 AEROSOL, METERED ORAL EVERY 6 HOURS
Refills: 0 | Status: DISCONTINUED | OUTPATIENT
Start: 2023-12-26 | End: 2023-12-28

## 2023-12-26 RX ADMIN — Medication 15 MILLIGRAM(S): at 09:32

## 2023-12-26 RX ADMIN — Medication 40 MILLIEQUIVALENT(S): at 12:48

## 2023-12-26 RX ADMIN — Medication 100 MILLIEQUIVALENT(S): at 04:05

## 2023-12-26 RX ADMIN — HYDROMORPHONE HYDROCHLORIDE 1 MILLIGRAM(S): 2 INJECTION INTRAMUSCULAR; INTRAVENOUS; SUBCUTANEOUS at 02:33

## 2023-12-26 RX ADMIN — HYDROMORPHONE HYDROCHLORIDE 0.5 MILLIGRAM(S): 2 INJECTION INTRAMUSCULAR; INTRAVENOUS; SUBCUTANEOUS at 12:57

## 2023-12-26 RX ADMIN — ENOXAPARIN SODIUM 40 MILLIGRAM(S): 100 INJECTION SUBCUTANEOUS at 06:54

## 2023-12-26 RX ADMIN — Medication 100 MILLIEQUIVALENT(S): at 02:33

## 2023-12-26 RX ADMIN — SODIUM CHLORIDE 50 MILLILITER(S): 9 INJECTION INTRAMUSCULAR; INTRAVENOUS; SUBCUTANEOUS at 06:55

## 2023-12-26 RX ADMIN — HYDROMORPHONE HYDROCHLORIDE 1 MILLIGRAM(S): 2 INJECTION INTRAMUSCULAR; INTRAVENOUS; SUBCUTANEOUS at 01:16

## 2023-12-26 RX ADMIN — AZITHROMYCIN 255 MILLIGRAM(S): 500 TABLET, FILM COATED ORAL at 06:54

## 2023-12-26 RX ADMIN — HYDROMORPHONE HYDROCHLORIDE 1 MILLIGRAM(S): 2 INJECTION INTRAMUSCULAR; INTRAVENOUS; SUBCUTANEOUS at 05:24

## 2023-12-26 RX ADMIN — BUDESONIDE AND FORMOTEROL FUMARATE DIHYDRATE 2 PUFF(S): 160; 4.5 AEROSOL RESPIRATORY (INHALATION) at 09:34

## 2023-12-26 RX ADMIN — HYDROMORPHONE HYDROCHLORIDE 1 MILLIGRAM(S): 2 INJECTION INTRAMUSCULAR; INTRAVENOUS; SUBCUTANEOUS at 17:42

## 2023-12-26 RX ADMIN — Medication 150 MICROGRAM(S): at 06:55

## 2023-12-26 RX ADMIN — ONDANSETRON 4 MILLIGRAM(S): 8 TABLET, FILM COATED ORAL at 20:31

## 2023-12-26 RX ADMIN — ONDANSETRON 4 MILLIGRAM(S): 8 TABLET, FILM COATED ORAL at 06:46

## 2023-12-26 RX ADMIN — Medication 32 MILLIGRAM(S): at 16:12

## 2023-12-26 RX ADMIN — CEFTRIAXONE 100 MILLIGRAM(S): 500 INJECTION, POWDER, FOR SOLUTION INTRAMUSCULAR; INTRAVENOUS at 06:54

## 2023-12-26 RX ADMIN — Medication 1 MILLIGRAM(S): at 21:35

## 2023-12-26 NOTE — ED ADULT NURSE REASSESSMENT NOTE - NS ED NURSE REASSESS COMMENT FT1
pt resting comfortably in bed, denies complaints at this time. respirations even and unlabored, completing full sentences. pt refusing po meds at this time. awaiting bed assignment. will continue to monitor.
pt resting comfortably in bed, denies complaints at this time. respirations even and unlabored, completing full sentences. will continue to monitor.
Pt resting comfortably in bed at this time, offers no complaints.  Denies any chest pain or SOB.  No vomiting/ diarrhea; pt given zofran for nausea.  Family at bedside.  Awaiting medical bed.  Maintain comfort and safety.

## 2023-12-26 NOTE — H&P ADULT - PROBLEM SELECTOR PLAN 2
Patient presents with leukocytosis  - CT a/p showed Small patchy opacities in the right lower lobe suspicious for pneumonia.   Recommend clinical correlation and follow-up chest CT imaging in 6-8   weeks to document resolution.  - Cxray ordered to confirm PNA, f/u results   - Continue Rocephin and Azithromax for pending cxray to confirm/rule out PNA   - Monitor fever and WBC curve  - Follow up blood and urine cultures, lactate, legionella urine antigen, strep pneumo urine antigen  - ID (Dr. Lizarraga) consulted, f/u recs

## 2023-12-26 NOTE — PROGRESS NOTE ADULT - SUBJECTIVE AND OBJECTIVE BOX
Patient is a 68y old  Female who presents with a chief complaint of PNA, UTI, Hypokalemia (26 Dec 2023 04:32)      Subjective:  INTERVAL HPI/OVERNIGHT EVENTS: Patient seen and examined at bedside. Patient in immense pain in bed. patient is diaphretic, nauseous, vomiting. Main complaint is pain located in her back.     MEDICATIONS  (STANDING):  budesonide 160 MICROgram(s)/formoterol 4.5 MICROgram(s) Inhaler 2 Puff(s) Inhalation two times a day  clonazePAM  Tablet 1 milliGRAM(s) Oral at bedtime  enoxaparin Injectable 40 milliGRAM(s) SubCutaneous every 24 hours  ketorolac   Injectable 15 milliGRAM(s) IV Push once  levothyroxine 150 MICROGram(s) Oral daily  potassium chloride    Tablet ER 40 milliEquivalent(s) Oral every 4 hours  sodium chloride 0.9%. 1000 milliLiter(s) (50 mL/Hr) IV Continuous <Continuous>  tamsulosin 0.4 milliGRAM(s) Oral daily    MEDICATIONS  (PRN):  acetaminophen     Tablet .. 650 milliGRAM(s) Oral every 6 hours PRN Temp greater or equal to 38C (100.4F), Mild Pain (1 - 3)  albuterol    90 MICROgram(s) HFA Inhaler 1 Puff(s) Inhalation every 6 hours PRN Shortness of Breath and/or Wheezing  aluminum hydroxide/magnesium hydroxide/simethicone Suspension 30 milliLiter(s) Oral every 4 hours PRN Dyspepsia  HYDROmorphone  Injectable 0.5 milliGRAM(s) IV Push every 4 hours PRN Moderate Pain (4 - 6)  HYDROmorphone  Injectable 1 milliGRAM(s) IV Push every 4 hours PRN Severe Pain (7 - 10)  melatonin 3 milliGRAM(s) Oral at bedtime PRN Insomnia  ondansetron Injectable 4 milliGRAM(s) IV Push every 8 hours PRN Nausea and/or Vomiting      Allergies    morphine (Nausea; Vomiting)  latex (Anaphylaxis)  penicillin (Hives)  codeine (Anaphylaxis; Hives)  fish (Anaphylaxis)    Intolerances        REVIEW OF SYSTEMS:  CONSTITUTIONAL: No fever or chills  HEENT:  No headache, no sore throat  RESPIRATORY: No cough, wheezing, or shortness of breath  CARDIOVASCULAR: No chest pain, palpitations  GASTROINTESTINAL: No abd pain, nausea, vomiting, or diarrhea  GENITOURINARY: No dysuria, frequency, or hematuria  NEUROLOGICAL: no focal weakness or dizziness  MUSCULOSKELETAL: no myalgias     Objective:  Vital Signs Last 24 Hrs  T(C): 36.8 (26 Dec 2023 04:55), Max: 37.1 (25 Dec 2023 20:21)  T(F): 98.2 (26 Dec 2023 04:55), Max: 98.8 (25 Dec 2023 20:21)  HR: 73 (26 Dec 2023 04:55) (73 - 95)  BP: 107/72 (26 Dec 2023 04:55) (11/67 - 134/70)  BP(mean): --  RR: 16 (26 Dec 2023 04:55) (16 - 17)  SpO2: 94% (26 Dec 2023 04:55) (94% - 96%)    Parameters below as of 26 Dec 2023 04:55  Patient On (Oxygen Delivery Method): room air        GENERAL: NAD, lying in bed comfortably  HEAD:  Atraumatic, Normocephalic  EYES: EOMI, PERRLA, conjunctiva and sclera clear  ENT: Moist mucous membranes  NECK: Supple, No JVD  CHEST/LUNG: Clear to auscultation bilaterally; No rales, rhonchi, wheezing, or rubs. Unlabored respirations  HEART: Regular rate and rhythm; No murmurs, rubs, or gallops  ABDOMEN: Bowel sounds present; Soft, Nontender, Nondistended. No hepatomegaly  EXTREMITIES:  2+ Peripheral Pulses, brisk capillary refill. No clubbing, cyanosis, or edema  NERVOUS SYSTEM:  Alert & Oriented X3, speech clear. No deficits   MSK: FROM all 4 extremities, full and equal strength  SKIN: No rashes or lesions    LABS:                        11.6   10.98 )-----------( 175      ( 26 Dec 2023 05:33 )             34.0     CBC Full  -  ( 26 Dec 2023 05:33 )  WBC Count : 10.98 K/uL  Hemoglobin : 11.6 g/dL  Hematocrit : 34.0 %  Platelet Count - Automated : 175 K/uL  Mean Cell Volume : 88.1 fl  Mean Cell Hemoglobin : 30.1 pg  Mean Cell Hemoglobin Concentration : 34.1 gm/dL  Auto Neutrophil # : x  Auto Lymphocyte # : x  Auto Monocyte # : x  Auto Eosinophil # : x  Auto Basophil # : x  Auto Neutrophil % : x  Auto Lymphocyte % : x  Auto Monocyte % : x  Auto Eosinophil % : x  Auto Basophil % : x    26 Dec 2023 05:33    141    |  112    |  15     ----------------------------<  107    3.4     |  24     |  0.49     Ca    8.3        26 Dec 2023 05:33    TPro  7.0    /  Alb  2.9    /  TBili  0.3    /  DBili  x      /  AST  43     /  ALT  39     /  AlkPhos  61     26 Dec 2023 05:33      Urinalysis Basic - ( 26 Dec 2023 05:33 )    Color: x / Appearance: x / SG: x / pH: x  Gluc: 107 mg/dL / Ketone: x  / Bili: x / Urobili: x   Blood: x / Protein: x / Nitrite: x   Leuk Esterase: x / RBC: x / WBC x   Sq Epi: x / Non Sq Epi: x / Bacteria: x      CAPILLARY BLOOD GLUCOSE              RADIOLOGY & ADDITIONAL TESTS:    Personally reviewed.     Consultant(s) Notes Reviewed:  [x] YES  [ ] NO     Patient is a 68y old  Female who presents with a chief complaint of PNA, UTI, Hypokalemia (26 Dec 2023 04:32)      Subjective:  INTERVAL HPI/OVERNIGHT EVENTS: Patient seen and examined at bedside. Patient in immense pain in bed. patient is diaphretic, nauseous, vomiting. Main complaint is pain located in her back. Has minor uncontrolled tremors in her hand from pain.     MEDICATIONS  (STANDING):  budesonide 160 MICROgram(s)/formoterol 4.5 MICROgram(s) Inhaler 2 Puff(s) Inhalation two times a day  clonazePAM  Tablet 1 milliGRAM(s) Oral at bedtime  enoxaparin Injectable 40 milliGRAM(s) SubCutaneous every 24 hours  ketorolac   Injectable 15 milliGRAM(s) IV Push once  levothyroxine 150 MICROGram(s) Oral daily  potassium chloride    Tablet ER 40 milliEquivalent(s) Oral every 4 hours  sodium chloride 0.9%. 1000 milliLiter(s) (50 mL/Hr) IV Continuous <Continuous>  tamsulosin 0.4 milliGRAM(s) Oral daily    MEDICATIONS  (PRN):  acetaminophen     Tablet .. 650 milliGRAM(s) Oral every 6 hours PRN Temp greater or equal to 38C (100.4F), Mild Pain (1 - 3)  albuterol    90 MICROgram(s) HFA Inhaler 1 Puff(s) Inhalation every 6 hours PRN Shortness of Breath and/or Wheezing  aluminum hydroxide/magnesium hydroxide/simethicone Suspension 30 milliLiter(s) Oral every 4 hours PRN Dyspepsia  HYDROmorphone  Injectable 0.5 milliGRAM(s) IV Push every 4 hours PRN Moderate Pain (4 - 6)  HYDROmorphone  Injectable 1 milliGRAM(s) IV Push every 4 hours PRN Severe Pain (7 - 10)  melatonin 3 milliGRAM(s) Oral at bedtime PRN Insomnia  ondansetron Injectable 4 milliGRAM(s) IV Push every 8 hours PRN Nausea and/or Vomiting      Allergies    morphine (Nausea; Vomiting)  latex (Anaphylaxis)  penicillin (Hives)  codeine (Anaphylaxis; Hives)  fish (Anaphylaxis)    Intolerances        REVIEW OF SYSTEMS:  CONSTITUTIONAL: No fever/chills, + generalized tremors  HEENT:  No headache, no sore throat  RESPIRATORY: No cough, wheezing, or shortness of breath  CARDIOVASCULAR: No chest pain, palpitations  GASTROINTESTINAL: + abd pain, nausea, vomiting  GENITOURINARY: + back pain, increased urinary frequency/ urgency  NEUROLOGICAL: no focal weakness or dizziness  MUSCULOSKELETAL: no myalgias     Objective:  Vital Signs Last 24 Hrs  T(C): 36.8 (26 Dec 2023 04:55), Max: 37.1 (25 Dec 2023 20:21)  T(F): 98.2 (26 Dec 2023 04:55), Max: 98.8 (25 Dec 2023 20:21)  HR: 73 (26 Dec 2023 04:55) (73 - 95)  BP: 107/72 (26 Dec 2023 04:55) (11/67 - 134/70)  BP(mean): --  RR: 16 (26 Dec 2023 04:55) (16 - 17)  SpO2: 94% (26 Dec 2023 04:55) (94% - 96%)    Parameters below as of 26 Dec 2023 04:55  Patient On (Oxygen Delivery Method): room air        GENERAL: NAD, lying in bed comfortably  HEAD:  Atraumatic, Normocephalic  EYES: EOMI, PERRLA, conjunctiva and sclera clear  ENT: Moist mucous membranes  NECK: Supple, No JVD  CHEST/LUNG: Clear to auscultation bilaterally; No rales, rhonchi, wheezing, or rubs. Unlabored respirations  HEART: Regular rate and rhythm; No murmurs, rubs, or gallops  ABDOMEN: Bowel sounds present; Soft, Nontender, Nondistended. No hepatomegaly  EXTREMITIES:  2+ Peripheral Pulses, brisk capillary refill. No clubbing, cyanosis, or edema  NERVOUS SYSTEM:  Alert & Oriented X3, speech clear. No deficits   MSK: FROM all 4 extremities, full and equal strength  SKIN: No rashes or lesions    LABS:                        11.6   10.98 )-----------( 175      ( 26 Dec 2023 05:33 )             34.0     CBC Full  -  ( 26 Dec 2023 05:33 )  WBC Count : 10.98 K/uL  Hemoglobin : 11.6 g/dL  Hematocrit : 34.0 %  Platelet Count - Automated : 175 K/uL  Mean Cell Volume : 88.1 fl  Mean Cell Hemoglobin : 30.1 pg  Mean Cell Hemoglobin Concentration : 34.1 gm/dL  Auto Neutrophil # : x  Auto Lymphocyte # : x  Auto Monocyte # : x  Auto Eosinophil # : x  Auto Basophil # : x  Auto Neutrophil % : x  Auto Lymphocyte % : x  Auto Monocyte % : x  Auto Eosinophil % : x  Auto Basophil % : x    26 Dec 2023 05:33    141    |  112    |  15     ----------------------------<  107    3.4     |  24     |  0.49     Ca    8.3        26 Dec 2023 05:33    TPro  7.0    /  Alb  2.9    /  TBili  0.3    /  DBili  x      /  AST  43     /  ALT  39     /  AlkPhos  61     26 Dec 2023 05:33      Urinalysis Basic - ( 26 Dec 2023 05:33 )    Color: x / Appearance: x / SG: x / pH: x  Gluc: 107 mg/dL / Ketone: x  / Bili: x / Urobili: x   Blood: x / Protein: x / Nitrite: x   Leuk Esterase: x / RBC: x / WBC x   Sq Epi: x / Non Sq Epi: x / Bacteria: x      CAPILLARY BLOOD GLUCOSE              RADIOLOGY & ADDITIONAL TESTS:    Personally reviewed.     Consultant(s) Notes Reviewed:  [x] YES  [ ] NO     Patient is a 68y old  Female who presents with a chief complaint of PNA, UTI, Hypokalemia (26 Dec 2023 04:32)      Subjective:  INTERVAL HPI/OVERNIGHT EVENTS: Patient seen and examined at bedside. Patient in immense pain in bed. patient is diaphoretic, nauseous, vomiting. Main complaint is pain located in her back. Has minor uncontrolled tremors in her hand from pain.     MEDICATIONS  (STANDING):  budesonide 160 MICROgram(s)/formoterol 4.5 MICROgram(s) Inhaler 2 Puff(s) Inhalation two times a day  clonazePAM  Tablet 1 milliGRAM(s) Oral at bedtime  enoxaparin Injectable 40 milliGRAM(s) SubCutaneous every 24 hours  ketorolac   Injectable 15 milliGRAM(s) IV Push once  levothyroxine 150 MICROGram(s) Oral daily  potassium chloride    Tablet ER 40 milliEquivalent(s) Oral every 4 hours  sodium chloride 0.9%. 1000 milliLiter(s) (50 mL/Hr) IV Continuous <Continuous>  tamsulosin 0.4 milliGRAM(s) Oral daily    MEDICATIONS  (PRN):  acetaminophen     Tablet .. 650 milliGRAM(s) Oral every 6 hours PRN Temp greater or equal to 38C (100.4F), Mild Pain (1 - 3)  albuterol    90 MICROgram(s) HFA Inhaler 1 Puff(s) Inhalation every 6 hours PRN Shortness of Breath and/or Wheezing  aluminum hydroxide/magnesium hydroxide/simethicone Suspension 30 milliLiter(s) Oral every 4 hours PRN Dyspepsia  HYDROmorphone  Injectable 0.5 milliGRAM(s) IV Push every 4 hours PRN Moderate Pain (4 - 6)  HYDROmorphone  Injectable 1 milliGRAM(s) IV Push every 4 hours PRN Severe Pain (7 - 10)  melatonin 3 milliGRAM(s) Oral at bedtime PRN Insomnia  ondansetron Injectable 4 milliGRAM(s) IV Push every 8 hours PRN Nausea and/or Vomiting      Allergies    morphine (Nausea; Vomiting)  latex (Anaphylaxis)  penicillin (Hives)  codeine (Anaphylaxis; Hives)  fish (Anaphylaxis)    Intolerances        REVIEW OF SYSTEMS:  CONSTITUTIONAL: No fever/chills, + generalized tremors  HEENT:  No headache, no sore throat  RESPIRATORY: No cough, wheezing, or shortness of breath  CARDIOVASCULAR: No chest pain, palpitations  GASTROINTESTINAL: + abd pain, nausea, vomiting  GENITOURINARY: + back pain, increased urinary frequency/ urgency  NEUROLOGICAL: no focal weakness or dizziness  MUSCULOSKELETAL: no myalgias     Objective:  Vital Signs Last 24 Hrs  T(C): 36.8 (26 Dec 2023 04:55), Max: 37.1 (25 Dec 2023 20:21)  T(F): 98.2 (26 Dec 2023 04:55), Max: 98.8 (25 Dec 2023 20:21)  HR: 73 (26 Dec 2023 04:55) (73 - 95)  BP: 107/72 (26 Dec 2023 04:55) (11/67 - 134/70)  BP(mean): --  RR: 16 (26 Dec 2023 04:55) (16 - 17)  SpO2: 94% (26 Dec 2023 04:55) (94% - 96%)    Parameters below as of 26 Dec 2023 04:55  Patient On (Oxygen Delivery Method): room air        GENERAL: NAD, lying in bed comfortably  HEAD:  Atraumatic, Normocephalic  EYES: EOMI, PERRLA, conjunctiva and sclera clear  ENT: Moist mucous membranes  NECK: Supple, No JVD  CHEST/LUNG: coarse breath sounds b/l  HEART: Regular rate and rhythm; No murmurs, rubs, or gallops  ABDOMEN: Bowel sounds present; Soft, Nontender, Nondistended. No hepatomegaly  EXTREMITIES:  2+ Peripheral Pulses, brisk capillary refill. No clubbing, cyanosis, or edema  NERVOUS SYSTEM:  Alert & Oriented X3, speech clear. No deficits   MSK: FROM all 4 extremities, full and equal strength  SKIN: No rashes or lesions    LABS:                        11.6   10.98 )-----------( 175      ( 26 Dec 2023 05:33 )             34.0     CBC Full  -  ( 26 Dec 2023 05:33 )  WBC Count : 10.98 K/uL  Hemoglobin : 11.6 g/dL  Hematocrit : 34.0 %  Platelet Count - Automated : 175 K/uL  Mean Cell Volume : 88.1 fl  Mean Cell Hemoglobin : 30.1 pg  Mean Cell Hemoglobin Concentration : 34.1 gm/dL  Auto Neutrophil # : x  Auto Lymphocyte # : x  Auto Monocyte # : x  Auto Eosinophil # : x  Auto Basophil # : x  Auto Neutrophil % : x  Auto Lymphocyte % : x  Auto Monocyte % : x  Auto Eosinophil % : x  Auto Basophil % : x    26 Dec 2023 05:33    141    |  112    |  15     ----------------------------<  107    3.4     |  24     |  0.49     Ca    8.3        26 Dec 2023 05:33    TPro  7.0    /  Alb  2.9    /  TBili  0.3    /  DBili  x      /  AST  43     /  ALT  39     /  AlkPhos  61     26 Dec 2023 05:33      Urinalysis Basic - ( 26 Dec 2023 05:33 )    Color: x / Appearance: x / SG: x / pH: x  Gluc: 107 mg/dL / Ketone: x  / Bili: x / Urobili: x   Blood: x / Protein: x / Nitrite: x   Leuk Esterase: x / RBC: x / WBC x   Sq Epi: x / Non Sq Epi: x / Bacteria: x      CAPILLARY BLOOD GLUCOSE              RADIOLOGY & ADDITIONAL TESTS:    Personally reviewed.     Consultant(s) Notes Reviewed:  [x] YES  [ ] NO

## 2023-12-26 NOTE — PHARMACOTHERAPY INTERVENTION NOTE - COMMENTS
Patient is a 68 year old  female currently ordered for standing clonazepam 1mg QHS. Based on age friendly 65+ report, recommended adding hold parameters for lethargy, sedation, or RR < 12. Discussed with Dr. Myles and special instructions for hold parameters were entered.

## 2023-12-26 NOTE — PATIENT PROFILE ADULT - FALL HARM RISK - HARM RISK INTERVENTIONS
Communicate Risk of Fall with Harm to all staff/Reinforce activity limits and safety measures with patient and family/Tailored Fall Risk Interventions/Visual Cue: Yellow wristband and red socks/Bed in lowest position, wheels locked, appropriate side rails in place/Call bell, personal items and telephone in reach/Instruct patient to call for assistance before getting out of bed or chair/Non-slip footwear when patient is out of bed/[place] to call system/Physically safe environment - no spills, clutter or unnecessary equipment/Purposeful Proactive Rounding/Room/bathroom lighting operational, light cord in reach Communicate Risk of Fall with Harm to all staff/Reinforce activity limits and safety measures with patient and family/Tailored Fall Risk Interventions/Visual Cue: Yellow wristband and red socks/Bed in lowest position, wheels locked, appropriate side rails in place/Call bell, personal items and telephone in reach/Instruct patient to call for assistance before getting out of bed or chair/Non-slip footwear when patient is out of bed/[place] to call system/Physically safe environment - no spills, clutter or unnecessary equipment/Purposeful Proactive Rounding/Room/bathroom lighting operational, light cord in reach

## 2023-12-26 NOTE — H&P ADULT - PROBLEM/PLAN-6
Refill requested: Metformin  Last visit: 5/17/21  Next visit:  Not scheduled,  Labs: 3/24/21  Refill was sent per Dr. Tang refill protocol.   If tolerates metformin 1 tablet, after 2 weeks can increase to 2 tablets daily after dinner.  
DISPLAY PLAN FREE TEXT

## 2023-12-26 NOTE — PROGRESS NOTE ADULT - ATTENDING COMMENTS
69y/o F with a PMHx of anxiety, hypothyroidism, asthma, kidney stones (s/p lithotripsy on 5/4/22) presents today complaining of lower abdominal pain. Admit for UTI, Hypokalemia, Non obstructing kidney stones and PNA.     IV abx, pain control. uro and ID consult appreciated. will repeat imaging with IV contrast for better evaluation.

## 2023-12-26 NOTE — CONSULT NOTE ADULT - SUBJECTIVE AND OBJECTIVE BOX
[facility]  INFECTIOUS DISEASES   [address] [address]  Tel: [phone]     Fax: [phone]  ========================================================  MD Sharda Lovelace Kaushal, MD Cho, Michelle, MD Sunjit, Jaspal, MD  ========================================================    MRN-[id number]  MARI ROBERTO     CC: Abdominal pain     HPI:  67y/o woman with PMH of anxiety, hypothyroidism, asthma, kidney stones s/p lithotripsy on 22 presents with lower abdominal pain.   "Patient reports that she was in [place]  to . Prior to leaving to [place], she had GI symptoms which prompted her to have a telehealth appt with her PCP who suspected diverticulitis and prescribed her cipro and flagyll. In [place], her symptoms persisted with concomitant low back pain and diarrhea which prompted her to go to the ED. Imaging reports showed bilateral non obstructing kidney stones.  Patient was advised if her symptoms get worse to go back to the ER.  Patient reports having diarrhea along with N/V around 4-5 xa day. Patient describes lower abdominal pain as cramping, and currently 10 out of 10.  Patient denies fever, HA, cp, sob, dysuria, hematuria, melena, hematochezia, hematemesis, or any other complaints. Patient was hospitalized in  with diverticulitis. "    PAST MEDICAL & SURGICAL HISTORY:  Hypothyroidism  Migraines  Anxiety  Kidney Calculi  Obesity  Fibromyalgia  Kidney Calculus- Lithotripsy x 3  S/P Hysterectomy   Delivery  S/P Total Thyroidectomy  History of Appendectomy  Asthma  S/P Laparoscopic Cholecystectomy  History of lumbar spinal fusion    Social Hx: No smoking, EtOH or drugs     FAMILY HISTORY:  FH: emphysema (Father)    FH: heart attack (Father)    Allergies  morphine (Nausea; Vomiting)  latex (Anaphylaxis)  penicillin (Hives)  codeine (Anaphylaxis; Hives)  fish (Anaphylaxis)    Antibiotics:  MEDICATIONS  (STANDING):  budesonide 160 MICROgram(s)/formoterol 4.5 MICROgram(s) Inhaler 2 Puff(s) Inhalation two times a day  clonazePAM  Tablet 1 milliGRAM(s) Oral at bedtime  enoxaparin Injectable 40 milliGRAM(s) SubCutaneous every 24 hours  levothyroxine 150 MICROGram(s) Oral daily  potassium chloride    Tablet ER 40 milliEquivalent(s) Oral every 4 hours  sodium chloride 0.9%. 1000 milliLiter(s) (50 mL/Hr) IV Continuous <Continuous>  tamsulosin 0.4 milliGRAM(s) Oral daily    MEDICATIONS  (PRN):  acetaminophen     Tablet .. 650 milliGRAM(s) Oral every 6 hours PRN Temp greater or equal to 38C (100.4F), Mild Pain (1 - 3)  albuterol    90 MICROgram(s) HFA Inhaler 1 Puff(s) Inhalation every 6 hours PRN Shortness of Breath and/or Wheezing  aluminum hydroxide/magnesium hydroxide/simethicone Suspension 30 milliLiter(s) Oral every 4 hours PRN Dyspepsia  HYDROmorphone  Injectable 1 milliGRAM(s) IV Push every 4 hours PRN Severe Pain (7 - 10)  HYDROmorphone  Injectable 0.5 milliGRAM(s) IV Push every 4 hours PRN Moderate Pain (4 - 6)  melatonin 3 milliGRAM(s) Oral at bedtime PRN Insomnia  ondansetron Injectable 4 milliGRAM(s) IV Push every 8 hours PRN Nausea and/or Vomiting     REVIEW OF SYSTEMS:  CONSTITUTIONAL:  No Fever or chills  HEENT:  No diplopia or blurred vision.  No sore throat or runny nose.  CARDIOVASCULAR:  No chest pain or SOB.  RESPIRATORY:  No cough, shortness of breath, PND or orthopnea.  GASTROINTESTINAL:  No nausea, vomiting or diarrhea.  GENITOURINARY:  No dysuria, frequency or urgency. No Blood in urine  MUSCULOSKELETAL:  no joint aches, no muscle pain  SKIN:  No change in skin, hair or nails.  NEUROLOGIC:  No paresthesias or weakness.  PSYCHIATRIC:  No disorder of thought or mood.  ENDOCRINE:  No heat or cold intolerance, polyuria or polydipsia.  HEMATOLOGICAL:  No easy bruising or bleeding.     Physical Exam:  Vital Signs Last 24 Hrs  T(C): 36.8 (26 Dec 2023 04:55), Max: 37.1 (25 Dec 2023 20:21)  T(F): 98.2 (26 Dec 2023 04:55), Max: 98.8 (25 Dec 2023 20:21)  HR: 73 (26 Dec 2023 04:55) (73 - 95)  BP: 107/72 (26 Dec 2023 04:55) (11/67 - 134/70)  BP(mean): --  RR: 16 (26 Dec 2023 04:55) (16 - 17)  SpO2: 94% (26 Dec 2023 04:55) (94% - 96%)  Parameters below as of 26 Dec 2023 04:55  Patient On (Oxygen Delivery Method): room air  Height (cm): 157.5 ( @ 20:21)  Weight (kg): 60 ( @ 20:21)  BMI (kg/m2): 24.2 ( @ 20:21)  BSA (m2): 1.6 ( @ 20:21)  GEN: NAD  HEENT: normocephalic and atraumatic. EOMI. PERRL.    NECK: Supple.  No lymphadenopathy   LUNGS: Crackles in both bases   HEART: Regular rate and rhythm without murmur.  ABDOMEN: Soft, nondistended.  Positive bowel sounds.    Mild lower abdominal pain   : No CVA tenderness  EXTREMITIES: Without edema.  NEUROLOGIC: grossly intact.  PSYCHIATRIC: Appropriate affect .  SKIN: No rash     Labs:      141  |  112<H>  |  15  ----------------------------<  107<H>  3.4<L>   |  24  |  0.49<L>    Ca    8.3<L>      26 Dec 2023 05:33    TPro  7.0  /  Alb  2.9<L>  /  TBili  0.3  /  DBili  x   /  AST  43<H>  /  ALT  39  /  AlkPhos  61                          11.6   10.98 )-----------( 175      ( 26 Dec 2023 05:33 )             34.0     Urinalysis Basic - ( 26 Dec 2023 05:33 )    Color: x / Appearance: x / SG: x / pH: x  Gluc: 107 mg/dL / Ketone: x  / Bili: x / Urobili: x   Blood: x / Protein: x / Nitrite: x   Leuk Esterase: x / RBC: x / WBC x   Sq Epi: x / Non Sq Epi: x / Bacteria: x    LIVER FUNCTIONS - ( 26 Dec 2023 05:33 )  Alb: 2.9 g/dL / Pro: 7.0 g/dL / ALK PHOS: 61 U/L / ALT: 39 U/L / AST: 43 U/L / GGT: x           SARS-CoV-2 Result: NotDetec (23 @ 02:35)  SARS-CoV-2: NotDetec (23 @ 02:35)    RECENT CULTURES:   @ 02:35      NotDetec    All imaging and other data have been reviewed.  < from: CT Abdomen and Pelvis No Cont (23 @ 02:03) >  IMPRESSION:  Exam limited by noncontrast technique and by streak artifact from hip   hardware and spinal hardware. No acute intra-abdominal findings within   these limitations.  Small patchy opacities in the right lower lobe suspicious for pneumonia.   Recommend clinical correlation and follow-up chest CT imaging in 6-8   weeks to document resolution.      Assessment and Plan:   67y/o woman with PMH of anxiety, hypothyroidism, asthma, kidney stones s/p lithotripsy on 22 and diverticulitis in  presents with lower abdominal pain.   CT with no pathology but it was noncontrast and also artifact form hip and spine hardware made it difficult to say in details. Lower chest with pneumonia.  No urinary symptoms, doubt to have a UTI.   WBC 14k on admission   Abdominal pain can happen with pneumonia but her pain is more in lower part of abdomen and still could be related to some pathology or diverticulitis in colorectal area.     # Abdominal pain   # Pneumonia     - Will follow blood and urine cultures   - Stool for PCR  - Imaging (CT with cont or MRI?? to better evaulte)  - Will monitor WBC and Tmax  - Start Ceftriaxone and azithromycin   - Will send MRSA PCR and Legionella U ag     Thank you for courtesy of this consult.     Will follow.  Discussed with the primary team.     Richard Lizarraga MD  Division of Infectious Diseases   Please call ID service at [phone] with any question.    75 minutes spent on total encounter assessing patient, examination, chart review, counseling and coordinating care by the attending physician/nurse/care manager.   [facility]  INFECTIOUS DISEASES   [address] [place] [address]  Tel: [phone]     Fax: [phone]  ========================================================  MD Sharda Lovelace Kaushal, MD Cho, Michelle, MD Sunjit, Jaspal, MD  ========================================================    MRN-[id number]  MARI ROBERTO     CC: Abdominal pain     HPI:  67y/o woman with PMH of anxiety, hypothyroidism, asthma, kidney stones s/p lithotripsy on 22 presents with lower abdominal pain.   "Patient reports that she was in [place]  to . Prior to leaving to [place], she had GI symptoms which prompted her to have a telehealth appt with her PCP who suspected diverticulitis and prescribed her cipro and flagyll. In [place], her symptoms persisted with concomitant low back pain and diarrhea which prompted her to go to the ED. Imaging reports showed bilateral non obstructing kidney stones.  Patient was advised if her symptoms get worse to go back to the ER.  Patient reports having diarrhea along with N/V around 4-5 xa day. Patient describes lower abdominal pain as cramping, and currently 10 out of 10.  Patient denies fever, HA, cp, sob, dysuria, hematuria, melena, hematochezia, hematemesis, or any other complaints. Patient was hospitalized in  with diverticulitis. "    PAST MEDICAL & SURGICAL HISTORY:  Hypothyroidism  Migraines  Anxiety  Kidney Calculi  Obesity  Fibromyalgia  Kidney Calculus- Lithotripsy x 3  S/P Hysterectomy   Delivery  S/P Total Thyroidectomy  History of Appendectomy  Asthma  S/P Laparoscopic Cholecystectomy  History of lumbar spinal fusion    Social Hx: No smoking, EtOH or drugs     FAMILY HISTORY:  FH: emphysema (Father)    FH: heart attack (Father)    Allergies  morphine (Nausea; Vomiting)  latex (Anaphylaxis)  penicillin (Hives)  codeine (Anaphylaxis; Hives)  fish (Anaphylaxis)    Antibiotics:  MEDICATIONS  (STANDING):  budesonide 160 MICROgram(s)/formoterol 4.5 MICROgram(s) Inhaler 2 Puff(s) Inhalation two times a day  clonazePAM  Tablet 1 milliGRAM(s) Oral at bedtime  enoxaparin Injectable 40 milliGRAM(s) SubCutaneous every 24 hours  levothyroxine 150 MICROGram(s) Oral daily  potassium chloride    Tablet ER 40 milliEquivalent(s) Oral every 4 hours  sodium chloride 0.9%. 1000 milliLiter(s) (50 mL/Hr) IV Continuous <Continuous>  tamsulosin 0.4 milliGRAM(s) Oral daily    MEDICATIONS  (PRN):  acetaminophen     Tablet .. 650 milliGRAM(s) Oral every 6 hours PRN Temp greater or equal to 38C (100.4F), Mild Pain (1 - 3)  albuterol    90 MICROgram(s) HFA Inhaler 1 Puff(s) Inhalation every 6 hours PRN Shortness of Breath and/or Wheezing  aluminum hydroxide/magnesium hydroxide/simethicone Suspension 30 milliLiter(s) Oral every 4 hours PRN Dyspepsia  HYDROmorphone  Injectable 1 milliGRAM(s) IV Push every 4 hours PRN Severe Pain (7 - 10)  HYDROmorphone  Injectable 0.5 milliGRAM(s) IV Push every 4 hours PRN Moderate Pain (4 - 6)  melatonin 3 milliGRAM(s) Oral at bedtime PRN Insomnia  ondansetron Injectable 4 milliGRAM(s) IV Push every 8 hours PRN Nausea and/or Vomiting     REVIEW OF SYSTEMS:  CONSTITUTIONAL:  No Fever or chills  HEENT:  No diplopia or blurred vision.  No sore throat or runny nose.  CARDIOVASCULAR:  No chest pain or SOB.  RESPIRATORY:  No cough, shortness of breath, PND or orthopnea.  GASTROINTESTINAL:  No nausea, vomiting or diarrhea.  GENITOURINARY:  No dysuria, frequency or urgency. No Blood in urine  MUSCULOSKELETAL:  no joint aches, no muscle pain  SKIN:  No change in skin, hair or nails.  NEUROLOGIC:  No paresthesias or weakness.  PSYCHIATRIC:  No disorder of thought or mood.  ENDOCRINE:  No heat or cold intolerance, polyuria or polydipsia.  HEMATOLOGICAL:  No easy bruising or bleeding.     Physical Exam:  Vital Signs Last 24 Hrs  T(C): 36.8 (26 Dec 2023 04:55), Max: 37.1 (25 Dec 2023 20:21)  T(F): 98.2 (26 Dec 2023 04:55), Max: 98.8 (25 Dec 2023 20:21)  HR: 73 (26 Dec 2023 04:55) (73 - 95)  BP: 107/72 (26 Dec 2023 04:55) (11/67 - 134/70)  BP(mean): --  RR: 16 (26 Dec 2023 04:55) (16 - 17)  SpO2: 94% (26 Dec 2023 04:55) (94% - 96%)  Parameters below as of 26 Dec 2023 04:55  Patient On (Oxygen Delivery Method): room air  Height (cm): 157.5 ( @ 20:21)  Weight (kg): 60 ( @ 20:21)  BMI (kg/m2): 24.2 ( @ 20:21)  BSA (m2): 1.6 ( @ 20:21)  GEN: NAD  HEENT: normocephalic and atraumatic. EOMI. PERRL.    NECK: Supple.  No lymphadenopathy   LUNGS: Crackles in both bases   HEART: Regular rate and rhythm without murmur.  ABDOMEN: Soft, nondistended.  Positive bowel sounds.    Mild lower abdominal pain   : No CVA tenderness  EXTREMITIES: Without edema.  NEUROLOGIC: grossly intact.  PSYCHIATRIC: Appropriate affect .  SKIN: No rash     Labs:      141  |  112<H>  |  15  ----------------------------<  107<H>  3.4<L>   |  24  |  0.49<L>    Ca    8.3<L>      26 Dec 2023 05:33    TPro  7.0  /  Alb  2.9<L>  /  TBili  0.3  /  DBili  x   /  AST  43<H>  /  ALT  39  /  AlkPhos  61                          11.6   10.98 )-----------( 175      ( 26 Dec 2023 05:33 )             34.0     Urinalysis Basic - ( 26 Dec 2023 05:33 )    Color: x / Appearance: x / SG: x / pH: x  Gluc: 107 mg/dL / Ketone: x  / Bili: x / Urobili: x   Blood: x / Protein: x / Nitrite: x   Leuk Esterase: x / RBC: x / WBC x   Sq Epi: x / Non Sq Epi: x / Bacteria: x    LIVER FUNCTIONS - ( 26 Dec 2023 05:33 )  Alb: 2.9 g/dL / Pro: 7.0 g/dL / ALK PHOS: 61 U/L / ALT: 39 U/L / AST: 43 U/L / GGT: x           SARS-CoV-2 Result: NotDetec (23 @ 02:35)  SARS-CoV-2: NotDetec (23 @ 02:35)    RECENT CULTURES:   @ 02:35      NotDetec    All imaging and other data have been reviewed.  < from: CT Abdomen and Pelvis No Cont (23 @ 02:03) >  IMPRESSION:  Exam limited by noncontrast technique and by streak artifact from hip   hardware and spinal hardware. No acute intra-abdominal findings within   these limitations.  Small patchy opacities in the right lower lobe suspicious for pneumonia.   Recommend clinical correlation and follow-up chest CT imaging in 6-8   weeks to document resolution.      Assessment and Plan:   67y/o woman with PMH of anxiety, hypothyroidism, asthma, kidney stones s/p lithotripsy on 22 and diverticulitis in  presents with lower abdominal pain.   CT with no pathology but it was noncontrast and also artifact form hip and spine hardware made it difficult to say in details. Lower chest with pneumonia.  No urinary symptoms, doubt to have a UTI.   WBC 14k on admission   Abdominal pain can happen with pneumonia but her pain is more in lower part of abdomen and still could be related to some pathology or diverticulitis in colorectal area.     # Abdominal pain   # Pneumonia     - Will follow blood and urine cultures   - Stool for PCR  - Imaging (CT with cont or MRI?? to better evaulte)  - Will monitor WBC and Tmax  - Start Ceftriaxone and azithromycin   - Will send MRSA PCR and Legionella U ag     Thank you for courtesy of this consult.     Will follow.  Discussed with the primary team.     Richard Lizarraga MD  Division of Infectious Diseases   Please call ID service at [phone] with any question.    75 minutes spent on total encounter assessing patient, examination, chart review, counseling and coordinating care by the attending physician/nurse/care manager.

## 2023-12-26 NOTE — H&P ADULT - HISTORY OF PRESENT ILLNESS
67y/o F with a PMHx of anxiety, hypothyroidism, asthma, kidney stones (s/p lithotripsy on 5/4/22) presents today complaining of lower abdominal pain.  Patient reports that she was in [place] 12/12 to 12/24. Prior to leaving to [place], she had GI symptoms which prompted her to have a telehealth appt with her PCP who suspected diverticulitis and prescribed her cipro and flagyll. In [place], her symptoms persisted with concominant low back pain and diarrhea which prompted her to go to the ED. Imaging reports showed bilateral non obstructing kidney stones.  Patient was advised if her symptoms get worse to go back to the ER.  Patient reports having diarrhea along with N/V around 4-5 xa day. Patient describes lower abdominal pain as cramping, and currently 10 out of 10.  Patient denies fever, HA, cp, sob, dysuria, hematuria, melena, hematochezia, hematemesis, or any other complaints.  Patient was hospitalized in 2022 with diverticulitis.     ED course:  VS T98.4, HR 95, /70   Labs Wbc 14.36 K 2.8 UA +occ bacteria +squamous epithelial cells +calcium oxalate crystals   Imaging: CT abd pelvis: Exam limited by noncontrast technique and by streak artifact from hip   hardware and spinal hardware. No acute intra-abdominal findings within   these limitations.  Small patchy opacities in the right lower lobe suspicious for pneumonia.   Recommend clinical correlation and follow-up chest CT imaging in 6-8   weeks to document resolution.  Given: Rocephin, Azithromycin, Potassium

## 2023-12-26 NOTE — PROGRESS NOTE ADULT - PROBLEM SELECTOR PLAN 2
Patient presents with leukocytosis  - CT a/p showed Small patchy opacities in the right lower lobe suspicious for pneumonia.   Recommend clinical correlation and follow-up chest CT imaging in 6-8   weeks to document resolution.  - ordered CT chest to better evaluate lung status, currently on RA  - Continue Rocephin and Azithromax  - Monitor fever and WBC curve  - Follow up blood and urine cultures, lactate, legionella urine antigen, strep pneumo urine antigen  - ID (Dr. Lizarraga) consulted, recs appreciated

## 2023-12-26 NOTE — PROGRESS NOTE ADULT - PROBLEM SELECTOR PLAN 1
Patient presents with abdominal pain   - UA with occasional bacteria +calcium oxalate crystals +squamous cells possible contamination   - Labs noteable for leukovytosis   - CT a/p performed in [place] showed bilateral non obstructing kidney stones on 12/20  - Continue Rocephin and Azithromax for now   - Trend WBC and monitor for fever  - F/u urine and blood cultures, lactate  - ID (Dr. Lizarraga) consulted, f/u recs  - Urology consulted, recs appreciated

## 2023-12-26 NOTE — PROGRESS NOTE ADULT - PROBLEM SELECTOR PLAN 6
Patient with pmhx of diverticulosis  - Last admission in 2022 with acute divertulitis   - CT a/p performed last week in [place] showed sigmoid diverticulosis without diverticulitis   - Patient reports recently completing a course of outpatient cipro and flagyll for suspected diverticulitis

## 2023-12-26 NOTE — H&P ADULT - ATTENDING COMMENTS
68 years old woman with past medical history of anxiety, hypothyroidism, asthma, kidney stones (s/p lithotripsy on 5/4/22) and diverticulitis is being admitted for PNA with abdominal and back pain possibly due to nephrolithiasis. Patient was found to have PNA on CT scan and we will empirically treat with ceftriaxone and zithromax. We have consulted ID. As for her pain, patient has a complicated kidney stone history and was told by her Urologist that if she felt pain she should come to the hospital to be evaluated for a possible procedure. Though, CT scan did not show any hydronephrosis or dilation of her ureters; patient did have a CT scan report and CD disk from imaging done in [place]. She is requesting an Urology consultation as well. For her diarrhea we have ordered GI studies. We are repleting her potassium which is low most likely due to her vomiting.

## 2023-12-26 NOTE — H&P ADULT - NSHPREVIEWOFSYSTEMS_GEN_ALL_CORE
REVIEW OF SYSTEMS:    CONSTITUTIONAL:  +weakness +chills   EYES/ENT:  No visual changes;  No vertigo or throat pain   NECK:  No pain or stiffness  RESPIRATORY:  No cough, wheezing, hemoptysis; No shortness of breath  CARDIOVASCULAR:  No chest pain or palpitations  GASTROINTESTINAL: +abd pain, +N/V + diarrhea  GENITOURINARY:  +lower abd pain   NEUROLOGICAL:  No numbness or weakness  SKIN:  No itching, rashes

## 2023-12-26 NOTE — PROGRESS NOTE ADULT - PROBLEM SELECTOR PLAN 3
- CT a/p performed in [place] showed bilateral non obstructing kidney stones on 12/20  - Per chart review, Pt with hx of recurrent kidney stones, follows with Dr. Moreira outpatient  - Recent lithotripsy for L-sided stone on 5/4, scheduled for R-sided lithotripsy on 5/23  - IV antibiotics as detailed above   - Continue IV fluids   - Pain management as ordered

## 2023-12-26 NOTE — H&P ADULT - PROBLEM SELECTOR PLAN 3
- CT a/p performed in [place] showed bilateral non obstructing kidney stones on 12/20  - Per chart review, Pt with hx of recurrent kidney stones, follows with Dr. Moreira outpatient  - Recent lithotripsy for L-sided stone on 5/4, scheduled for R-sided lithotripsy on 5/23  - IV antibiotics as detailed above   - Continue IV fluids   - Pain management as ordered   - Urology consulted appreciate recs - CT a/p performed in [place] showed bilateral non obstructing kidney stones on 12/20  - Per chart review, Pt with hx of recurrent kidney stones, follows with Dr. Moreira outpatient  - Recent lithotripsy for L-sided stone on 5/4, scheduled for R-sided lithotripsy on 5/23  - IV antibiotics as detailed above   - Continue IV fluids   - Pain management as ordered   -Day team to consult Urology

## 2023-12-26 NOTE — H&P ADULT - PROBLEM SELECTOR PLAN 1
Patient presents with abdominal pain   - UA with occasional bacteria +calcium oxalate crystals +squamous cells possible contamination   - Labs noteable for leukovytosis   - CT a/p performed in [place] showed bilateral non obstructing kidney stones on 12/20  - Continue Rocephin and Azithromax for now   - Trend WBC and monitor for fever  - F/u urine and blood cultures, lactate  - ID (Dr. Lizarraga) consulted, f/u recs  - Urology consulted, appreciate recs Patient presents with abdominal pain   - UA with occasional bacteria +calcium oxalate crystals +squamous cells possible contamination   - Labs noteable for leukovytosis   - CT a/p performed in [place] showed bilateral non obstructing kidney stones on 12/20  - Continue Rocephin and Azithromax for now   - Trend WBC and monitor for fever  - F/u urine and blood cultures, lactate  - ID (Dr. Lizarraga) consulted, f/u recs  - Day team to consult Urology

## 2023-12-26 NOTE — ED ADULT NURSE REASSESSMENT NOTE - NSFALLUNIVINTERV_ED_ALL_ED
Bed/Stretcher in lowest position, wheels locked, appropriate side rails in place/Call bell, personal items and telephone in reach/Instruct patient to call for assistance before getting out of bed/chair/stretcher/Non-slip footwear applied when patient is off stretcher/[place] to call system/Physically safe environment - no spills, clutter or unnecessary equipment/Purposeful proactive rounding/Room/bathroom lighting operational, light cord in reach Bed/Stretcher in lowest position, wheels locked, appropriate side rails in place/Call bell, personal items and telephone in reach/Instruct patient to call for assistance before getting out of bed/chair/stretcher/Non-slip footwear applied when patient is off stretcher/[place] to call system/Physically safe environment - no spills, clutter or unnecessary equipment/Purposeful proactive rounding/Room/bathroom lighting operational, light cord in reach

## 2023-12-26 NOTE — H&P ADULT - PROBLEM SELECTOR PLAN 5
K 2.8 on admission likely secondary to diarrhea, poor PO intake, dehydration   - K repleted   - F/u AM labs

## 2023-12-26 NOTE — CONSULT NOTE ADULT - SUBJECTIVE AND OBJECTIVE BOX
Note to be followed.  SURGERY PA CONSULT NOTE:    CHIEF COMPLAINT:   Patient is a 68y old  Female who presents with a chief complaint of PNA, UTI, Hypokalemia (26 Dec 2023 09:56)      HPI FROM ED:  HPI:  67y/o F with a PMHx of anxiety, hypothyroidism, asthma, kidney stones (s/p lithotripsy on 22) presents today complaining of lower abdominal pain.  Patient reports that she was in [place]  to . Prior to leaving to [place], she had GI symptoms which prompted her to have a telehealth appt with her PCP who suspected diverticulitis and prescribed her cipro and flagyll. In [place], her symptoms persisted with concominant low back pain and diarrhea which prompted her to go to the ED. Imaging reports showed bilateral non obstructing kidney stones.  Patient was advised if her symptoms get worse to go back to the ER.  Patient reports having diarrhea along with N/V around 4-5 xa day. Patient describes lower abdominal pain as cramping, and currently 10 out of 10.  Patient denies fever, HA, cp, sob, dysuria, hematuria, melena, hematochezia, hematemesis, or any other complaints.  Patient was hospitalized in  with diverticulitis.     ED course:  VS T98.4, HR 95, /70   Labs Wbc 14.36 K 2.8 UA +occ bacteria +squamous epithelial cells +calcium oxalate crystals   Imaging: CT abd pelvis: Exam limited by noncontrast technique and by streak artifact from hip   hardware and spinal hardware. No acute intra-abdominal findings within   these limitations.  Small patchy opacities in the right lower lobe suspicious for pneumonia.   Recommend clinical correlation and follow-up chest CT imaging in 6-8   weeks to document resolution.  Given: Rocephin, Azithromycin, Potassium    (26 Dec 2023 04:32)    Patient was seen and evaluated at ED. Pt has intermittent crampy lower abdominal pain, 10/10 when worst, with bilateral flank pain and mid-back pain since 2023. Pt has been following Urology Dr. Khan for bilateral nethrolithiasis with hx of ESWL x 3 and the recently scheduled ESWL postponed due to recent right total hip replacement on 2023. Latest visit to Dr. Khan  Pt endorses nausea, multiple NBNB emesis, chills, dark orange-color urinnation and diarrhea for the past few days.   Denies fever, dysuria.   Pt also endorses cold-like symptoms for the past 2 weeks, nasal congestion, dry cough, chills. She completed 5-days regimen of Cipro and flagyl 3 days ago.      Labs significant for leukocytosis of 14.36 with neutrophilia of 81.6% and hypokalemia of 2.8 upon ED admission. Pt received ceftriaxone and azithromycin at ED with repeat WBC downtrending (14.36 > 10.98)  and improving K+ level (2.8>3.4).    Pt admitted for UTI, PNA and hypokalemia.   CT imaging notable for bilateral non-obstructing renal calculi without hydronephrosis.   Urology consulted for nephrolithiasis.           PAST MEDICAL HISTORY:  PAST MEDICAL & SURGICAL HISTORY:  Hypothyroidism      Migraines      Anxiety      Kidney Calculi      Obesity      Fibromyalgia      Kidney Calculus- Lithotripsy x 3      S/P Hysterectomy       Delivery      Asthma           PAST SURGICAL HISTORY:      S/P Hysterectomy       Delivery      S/P Total Thyroidectomy      History of Appendectomy      S/P Laparoscopic Cholecystectomy      History of lumbar spinal fusion        REVIEW OF SYSTEMS:  General/Constitutional: No acute distress, no headache, weakness, fevers, or chills   HEENT: Denies auditory or visual changes/disturbances, no vertigo, no throat pain, no dysphagia    Neck: Denies neck pain/stiffness, denies swelling/lumps/hoarseness   Respiratory: Denies cough/hemoptysis, denies wheezing/SOB/dyspnea  Cardiac: Denies chest pain, palpitations  Abdomen:  Endorses nausea,  NBNB vomiting, diarrhea, abdominal pain especially in lower abdomen and bilateral flank and mid-back.   Extremities: Denies sores, swelling, discoloration bilat UE/LE  Genitourinary: Reports dark orange color urine without any pain during urination.      Neuro: Denies weakness, paraesthesias, paralysis, syncope, loss of vision  Skin: Denies pruritus, pain, rashes  Psych: Denies hallucinations, visual disturbances, or depression    MEDICATIONS:  Home Medications:  albuterol 90 mcg/inh inhalation aerosol: 1 puff(s) inhaled every 6 hours As needed Shortness of Breath and/or Wheezing (26 Dec 2023 04:23)  budesonide-formoterol 160 mcg-4.5 mcg/inh inhalation aerosol: 1 puff(s) inhaled once a day (26 Dec 2023 04:23)  clonazePAM 1 mg oral tablet: 1 tab(s) orally 3 times a day, As Needed (26 Dec 2023 04:23)  levothyroxine 150 mcg (0.15 mg) oral tablet: 1 tab(s) orally once a day (26 Dec 2023 04:23)    MEDICATIONS  (STANDING):  azithromycin  IVPB 500 milliGRAM(s) IV Intermittent every 24 hours  budesonide 160 MICROgram(s)/formoterol 4.5 MICROgram(s) Inhaler 2 Puff(s) Inhalation two times a day  cefTRIAXone   IVPB 1000 milliGRAM(s) IV Intermittent every 24 hours  clonazePAM  Tablet 1 milliGRAM(s) Oral at bedtime  enoxaparin Injectable 40 milliGRAM(s) SubCutaneous every 24 hours  levothyroxine 150 MICROGram(s) Oral daily  potassium chloride    Tablet ER 40 milliEquivalent(s) Oral every 4 hours  sodium chloride 0.9%. 1000 milliLiter(s) (50 mL/Hr) IV Continuous <Continuous>  tamsulosin 0.4 milliGRAM(s) Oral daily    MEDICATIONS  (PRN):  acetaminophen     Tablet .. 650 milliGRAM(s) Oral every 6 hours PRN Temp greater or equal to 38C (100.4F), Mild Pain (1 - 3)  albuterol    90 MICROgram(s) HFA Inhaler 1 Puff(s) Inhalation every 6 hours PRN Shortness of Breath and/or Wheezing  aluminum hydroxide/magnesium hydroxide/simethicone Suspension 30 milliLiter(s) Oral every 4 hours PRN Dyspepsia  HYDROmorphone  Injectable 0.5 milliGRAM(s) IV Push every 4 hours PRN Moderate Pain (4 - 6)  HYDROmorphone  Injectable 1 milliGRAM(s) IV Push every 4 hours PRN Severe Pain (7 - 10)  melatonin 3 milliGRAM(s) Oral at bedtime PRN Insomnia  ondansetron Injectable 4 milliGRAM(s) IV Push every 8 hours PRN Nausea and/or Vomiting      ALLERGIES:  Allergies    morphine (Nausea; Vomiting)  latex (Anaphylaxis)  penicillin (Hives)  codeine (Anaphylaxis; Hives)  fish (Anaphylaxis)    Intolerances        SOCIAL HISTORY:  Social History:  Denies current alcohol and tobacco use   Previous smoker for 20 years around 1 pack a week, quit in   Independent with ADLs (26 Dec 2023 04:32)    Smoking: Yes [ ]  No [ ]   ______pk yrs  ETOH  Yes [ ]  No [ ]  Social [ ]  DRUGS:  Yes [ ]  No [ ]  if so what______________    FAMILY HISTORY:  FAMILY HISTORY:  FH: emphysema (Father)    FH: heart attack (Father)        VITAL SIGNS:  Vital Signs Last 24 Hrs  T(C): 36.8 (26 Dec 2023 04:55), Max: 37.1 (25 Dec 2023 20:21)  T(F): 98.2 (26 Dec 2023 04:55), Max: 98.8 (25 Dec 2023 20:21)  HR: 73 (26 Dec 2023 04:55) (73 - 95)  BP: 107/72 (26 Dec 2023 04:55) (11/67 - 134/70)  BP(mean): --  RR: 16 (26 Dec 2023 04:55) (16 - 17)  SpO2: 94% (26 Dec 2023 04:55) (94% - 96%)    Parameters below as of 26 Dec 2023 04:55  Patient On (Oxygen Delivery Method): room air        PHYSICAL EXAM:  General: No acute distress, appears comfortable, well-groomed, appears stated age  Head, Eyes, Ears, Nose, Throat: Normal cephalic/atraumatic, anicteric, conjunctiva-non injected and moist, vision grossly intact, hearing grossly intact, no nasal discharge, ears and nose symmetrical and atraumatic.  Nasal, oral, and oropharyngeal mucosa pink moist with no evidence of ulceration  Neck: Supple, carotids have good upstroke, trachea in the midline, without JVD or thyromegaly  Chest: Lungs are clear to P&A, no wheezing, no rales, no ronchi, with good inspiratory effort  Heart: Heart rhythm regular, no murmurs  Abdomen: Soft, ttp in lower abdomen, non-distended. No guarding nor rebound.  good bowel sounds present in all four quadrants.  No evidence of hepatosplenomegaly.    Extremity: No swelling, or open sores, no gross deformities,  good range of motion, 2+ peripheral pulses bilat UE/LE, no edema,  Neuro: Alert and oriented x3, motor and sensory intact  Psychiatric: Awake , alert, oriented x3 with an appropriate affect.   Skin: warm, dry.      LABS:                        11.6   10.98 )-----------( 175      ( 26 Dec 2023 05:33 )             34.0         141  |  112<H>  |  15  ----------------------------<  107<H>  3.4<L>   |  24  |  0.49<L>    Ca    8.3<L>      26 Dec 2023 05:33    TPro  7.0  /  Alb  2.9<L>  /  TBili  0.3  /  DBili  x   /  AST  43<H>  /  ALT  39  /  AlkPhos  61        Urinalysis Basic - ( 26 Dec 2023 05:33 )    Color: x / Appearance: x / SG: x / pH: x  Gluc: 107 mg/dL / Ketone: x  / Bili: x / Urobili: x   Blood: x / Protein: x / Nitrite: x   Leuk Esterase: x / RBC: x / WBC x   Sq Epi: x / Non Sq Epi: x / Bacteria: x      LIVER FUNCTIONS - ( 26 Dec 2023 05:33 )  Alb: 2.9 g/dL / Pro: 7.0 g/dL / ALK PHOS: 61 U/L / ALT: 39 U/L / AST: 43 U/L / GGT: x               RADIOLOGY & ADDITIONAL STUDIES:  FINDINGS:  Evaluation of solid organs and vascular structures is limited without   intravenous contrast. Streak artifact from spinal hardware limits   evaluation.    LOWER CHEST: Small patchy airspace opacities in the right lower lobe.   Small hiatal hernia.    LIVER: Stable left hepatic lobe cyst.  BILE DUCTS: Stable CBD prominence, which could be related to   cholecystectomy.  GALLBLADDER: Cholecystectomy.  SPLEEN: Within normal limits.  PANCREAS: Within normal limits.  ADRENALS: Within normal limits.  KIDNEYS/URETERS: No hydronephrosis. Bilateral nonobstructing renal   calculi.    BLADDER: Not well evaluated due to streak artifact from hip hardware.  REPRODUCTIVE ORGANS: Not well evaluated due to streak artifact from hip   hardware.    BOWEL: No bowel obstruction. Appendix is not visualized. No evidence of   inflammation in the pericecal region. Colonic diverticulosis.  PERITONEUM: No ascites.  VESSELS: Atherosclerotic changes.  RETROPERITONEUM/LYMPH NODES: No lymphadenopathy.  ABDOMINAL WALL: Within normal limits.  BONES: Right hiparthroplasty. Status post multilevel laminectomy and   posterior fusion at L3-S1. Degenerative changes.    IMPRESSION:  Exam limited by noncontrast technique and by streak artifact from hip   hardware and spinal hardware. No acute intra-abdominal findings within   these limitations.    Small patchy opacities in the right lower lobe suspicious for pneumonia.   Recommend clinical correlation and follow-up chest CT imaging in 6-8   weeks to document resolution.      ASSESSMENT:  69 yo female with hx of bilateral nephrolithiasis with ESWL x 3 with Dr. Khan admitted for UTI, PNA and hypokalemia.   Urology consulted for non-obstructing bilateral renal calculi.    Afebrile, hemodynamically stable.   Leukocytosis of 14 with neutrophilia 81.6%  UTI  PNA   Improving hypokalemia 2.8>3.4   Lactic acidemia of 1.3, received IVF bolus 1L at ED.   Received ceftriaxone and azithromycin at ED.     PLAN:  -The pain unlikely from nephrolithiasis with normal Cr 0.65 and non-obstructing renal calculi, Unclear etiology for abdominal pain at this time,   - Recommends further workup for abdominal pain   -Pain control PRN  - Hydration  - Case and plan discussed with Dr. Daigle

## 2023-12-26 NOTE — PROGRESS NOTE ADULT - ASSESSMENT
69y/o F with a PMHx of anxiety, hypothyroidism, asthma, kidney stones (s/p lithotripsy on 5/4/22) presents today complaining of lower abdominal pain. Admit for UTI, Hypokalemia, Non obstructing kidney stones and PNA.  67y/o F with a PMHx of anxiety, hypothyroidism, asthma, kidney stones (s/p lithotripsy on 5/4/22) presents today complaining of lower abdominal pain. Admit for UTI, Hypokalemia, Non obstructing kidney stones and PNA.

## 2023-12-26 NOTE — H&P ADULT - NSHPPHYSICALEXAM_GEN_ALL_CORE
T(C): 37 (12-25-23 @ 23:48), Max: 37.1 (12-25-23 @ 20:21)  HR: 95 (12-25-23 @ 23:48) (91 - 95)  BP: 11/67 (12-25-23 @ 23:48) (11/67 - 134/70)  RR: 17 (12-25-23 @ 23:48) (16 - 17)  SpO2: 94% (12-25-23 @ 23:48) (94% - 96%)    PHYSICAL EXAM:  GENERAL: appears uncomfortable and in distress   HEAD:  Atraumatic, Normocephalic  EYES: EOMI, PERRLA, conjunctiva and sclera clear  ENT: Moist mucous membranes  NECK: Supple, No JVD  CHEST/LUNG: Clear to auscultation bilaterally; No rales, rhonchi, wheezing, or rubs. Unlabored respirations  HEART: Regular rate and rhythm; No murmurs, rubs, or gallops  ABDOMEN: Soft; (+) lower abdominal TTP, no guarding or rebound TTP  EXTREMITIES:  2+ Peripheral Pulses, brisk capillary refill. No clubbing, cyanosis, or edema  NERVOUS SYSTEM:  Alert & Oriented X3, speech clear. No deficits   MSK: FROM all 4 extremities, full and equal strength  SKIN: No rashes or lesions

## 2023-12-26 NOTE — H&P ADULT - PROBLEM SELECTOR PLAN 4
Patient complaining of recurrent diarrhea, N/V and cramping abd pain   - CT a/p results as above  - CT a/p performed last week in [place] showed sigmoid diverticulosis without diverticulitis   - F/u GI PCR, stool ova and parasite, stool cultures

## 2023-12-26 NOTE — H&P ADULT - ASSESSMENT
67y/o F with a PMHx of anxiety, hypothyroidism, asthma, kidney stones (s/p lithotripsy on 5/4/22) presents today complaining of lower abdominal pain. Admit for UTI, Hypokalemia, Non obstructing kidney stones and PNA.

## 2023-12-27 LAB
ALBUMIN SERPL ELPH-MCNC: 2.5 G/DL — LOW (ref 3.3–5)
ALBUMIN SERPL ELPH-MCNC: 2.5 G/DL — LOW (ref 3.3–5)
ALP SERPL-CCNC: 52 U/L — SIGNIFICANT CHANGE UP (ref 40–120)
ALP SERPL-CCNC: 52 U/L — SIGNIFICANT CHANGE UP (ref 40–120)
ALT FLD-CCNC: 34 U/L — SIGNIFICANT CHANGE UP (ref 12–78)
ALT FLD-CCNC: 34 U/L — SIGNIFICANT CHANGE UP (ref 12–78)
ANION GAP SERPL CALC-SCNC: 5 MMOL/L — SIGNIFICANT CHANGE UP (ref 5–17)
ANION GAP SERPL CALC-SCNC: 5 MMOL/L — SIGNIFICANT CHANGE UP (ref 5–17)
AST SERPL-CCNC: 29 U/L — SIGNIFICANT CHANGE UP (ref 15–37)
AST SERPL-CCNC: 29 U/L — SIGNIFICANT CHANGE UP (ref 15–37)
BASOPHILS # BLD AUTO: 0 K/UL — SIGNIFICANT CHANGE UP (ref 0–0.2)
BASOPHILS # BLD AUTO: 0 K/UL — SIGNIFICANT CHANGE UP (ref 0–0.2)
BASOPHILS NFR BLD AUTO: 0 % — SIGNIFICANT CHANGE UP (ref 0–2)
BASOPHILS NFR BLD AUTO: 0 % — SIGNIFICANT CHANGE UP (ref 0–2)
BILIRUB SERPL-MCNC: 0.3 MG/DL — SIGNIFICANT CHANGE UP (ref 0.2–1.2)
BILIRUB SERPL-MCNC: 0.3 MG/DL — SIGNIFICANT CHANGE UP (ref 0.2–1.2)
BUN SERPL-MCNC: 14 MG/DL — SIGNIFICANT CHANGE UP (ref 7–23)
BUN SERPL-MCNC: 14 MG/DL — SIGNIFICANT CHANGE UP (ref 7–23)
CALCIUM SERPL-MCNC: 8.4 MG/DL — LOW (ref 8.5–10.1)
CALCIUM SERPL-MCNC: 8.4 MG/DL — LOW (ref 8.5–10.1)
CHLORIDE SERPL-SCNC: 113 MMOL/L — HIGH (ref 96–108)
CHLORIDE SERPL-SCNC: 113 MMOL/L — HIGH (ref 96–108)
CO2 SERPL-SCNC: 22 MMOL/L — SIGNIFICANT CHANGE UP (ref 22–31)
CO2 SERPL-SCNC: 22 MMOL/L — SIGNIFICANT CHANGE UP (ref 22–31)
CREAT SERPL-MCNC: 0.36 MG/DL — LOW (ref 0.5–1.3)
CREAT SERPL-MCNC: 0.36 MG/DL — LOW (ref 0.5–1.3)
EGFR: 111 ML/MIN/1.73M2 — SIGNIFICANT CHANGE UP
EGFR: 111 ML/MIN/1.73M2 — SIGNIFICANT CHANGE UP
EOSINOPHIL # BLD AUTO: 0 K/UL — SIGNIFICANT CHANGE UP (ref 0–0.5)
EOSINOPHIL # BLD AUTO: 0 K/UL — SIGNIFICANT CHANGE UP (ref 0–0.5)
EOSINOPHIL NFR BLD AUTO: 0 % — SIGNIFICANT CHANGE UP (ref 0–6)
EOSINOPHIL NFR BLD AUTO: 0 % — SIGNIFICANT CHANGE UP (ref 0–6)
GLUCOSE SERPL-MCNC: 144 MG/DL — HIGH (ref 70–99)
GLUCOSE SERPL-MCNC: 144 MG/DL — HIGH (ref 70–99)
HCT VFR BLD CALC: 34.1 % — LOW (ref 34.5–45)
HCT VFR BLD CALC: 34.1 % — LOW (ref 34.5–45)
HGB BLD-MCNC: 11.2 G/DL — LOW (ref 11.5–15.5)
HGB BLD-MCNC: 11.2 G/DL — LOW (ref 11.5–15.5)
LEGIONELLA AG UR QL: NEGATIVE — SIGNIFICANT CHANGE UP
LEGIONELLA AG UR QL: NEGATIVE — SIGNIFICANT CHANGE UP
LYMPHOCYTES # BLD AUTO: 0.71 K/UL — LOW (ref 1–3.3)
LYMPHOCYTES # BLD AUTO: 0.71 K/UL — LOW (ref 1–3.3)
LYMPHOCYTES # BLD AUTO: 10 % — LOW (ref 13–44)
LYMPHOCYTES # BLD AUTO: 10 % — LOW (ref 13–44)
MCHC RBC-ENTMCNC: 29.6 PG — SIGNIFICANT CHANGE UP (ref 27–34)
MCHC RBC-ENTMCNC: 29.6 PG — SIGNIFICANT CHANGE UP (ref 27–34)
MCHC RBC-ENTMCNC: 32.8 GM/DL — SIGNIFICANT CHANGE UP (ref 32–36)
MCHC RBC-ENTMCNC: 32.8 GM/DL — SIGNIFICANT CHANGE UP (ref 32–36)
MCV RBC AUTO: 90.2 FL — SIGNIFICANT CHANGE UP (ref 80–100)
MCV RBC AUTO: 90.2 FL — SIGNIFICANT CHANGE UP (ref 80–100)
MONOCYTES # BLD AUTO: 0.14 K/UL — SIGNIFICANT CHANGE UP (ref 0–0.9)
MONOCYTES # BLD AUTO: 0.14 K/UL — SIGNIFICANT CHANGE UP (ref 0–0.9)
MONOCYTES NFR BLD AUTO: 2 % — SIGNIFICANT CHANGE UP (ref 2–14)
MONOCYTES NFR BLD AUTO: 2 % — SIGNIFICANT CHANGE UP (ref 2–14)
NEUTROPHILS # BLD AUTO: 6.16 K/UL — SIGNIFICANT CHANGE UP (ref 1.8–7.4)
NEUTROPHILS # BLD AUTO: 6.16 K/UL — SIGNIFICANT CHANGE UP (ref 1.8–7.4)
NEUTROPHILS NFR BLD AUTO: 83 % — HIGH (ref 43–77)
NEUTROPHILS NFR BLD AUTO: 83 % — HIGH (ref 43–77)
NRBC # BLD: SIGNIFICANT CHANGE UP /100 WBCS (ref 0–0)
NRBC # BLD: SIGNIFICANT CHANGE UP /100 WBCS (ref 0–0)
PLATELET # BLD AUTO: 172 K/UL — SIGNIFICANT CHANGE UP (ref 150–400)
PLATELET # BLD AUTO: 172 K/UL — SIGNIFICANT CHANGE UP (ref 150–400)
POTASSIUM SERPL-MCNC: 4.3 MMOL/L — SIGNIFICANT CHANGE UP (ref 3.5–5.3)
POTASSIUM SERPL-MCNC: 4.3 MMOL/L — SIGNIFICANT CHANGE UP (ref 3.5–5.3)
POTASSIUM SERPL-SCNC: 4.3 MMOL/L — SIGNIFICANT CHANGE UP (ref 3.5–5.3)
POTASSIUM SERPL-SCNC: 4.3 MMOL/L — SIGNIFICANT CHANGE UP (ref 3.5–5.3)
PROT SERPL-MCNC: 6.5 G/DL — SIGNIFICANT CHANGE UP (ref 6–8.3)
PROT SERPL-MCNC: 6.5 G/DL — SIGNIFICANT CHANGE UP (ref 6–8.3)
RBC # BLD: 3.78 M/UL — LOW (ref 3.8–5.2)
RBC # BLD: 3.78 M/UL — LOW (ref 3.8–5.2)
RBC # FLD: 14.8 % — HIGH (ref 10.3–14.5)
RBC # FLD: 14.8 % — HIGH (ref 10.3–14.5)
S PNEUM AG UR QL: NEGATIVE — SIGNIFICANT CHANGE UP
S PNEUM AG UR QL: NEGATIVE — SIGNIFICANT CHANGE UP
SODIUM SERPL-SCNC: 140 MMOL/L — SIGNIFICANT CHANGE UP (ref 135–145)
SODIUM SERPL-SCNC: 140 MMOL/L — SIGNIFICANT CHANGE UP (ref 135–145)
WBC # BLD: 7.08 K/UL — SIGNIFICANT CHANGE UP (ref 3.8–10.5)
WBC # BLD: 7.08 K/UL — SIGNIFICANT CHANGE UP (ref 3.8–10.5)
WBC # FLD AUTO: 7.08 K/UL — SIGNIFICANT CHANGE UP (ref 3.8–10.5)
WBC # FLD AUTO: 7.08 K/UL — SIGNIFICANT CHANGE UP (ref 3.8–10.5)

## 2023-12-27 PROCEDURE — 71260 CT THORAX DX C+: CPT | Mod: 26

## 2023-12-27 PROCEDURE — 99232 SBSQ HOSP IP/OBS MODERATE 35: CPT

## 2023-12-27 PROCEDURE — 99233 SBSQ HOSP IP/OBS HIGH 50: CPT

## 2023-12-27 PROCEDURE — 74177 CT ABD & PELVIS W/CONTRAST: CPT | Mod: 26

## 2023-12-27 RX ORDER — DIPHENHYDRAMINE HCL 50 MG
25 CAPSULE ORAL EVERY 6 HOURS
Refills: 0 | Status: DISCONTINUED | OUTPATIENT
Start: 2023-12-27 | End: 2023-12-28

## 2023-12-27 RX ORDER — DIPHENHYDRAMINE HCL 50 MG
50 CAPSULE ORAL ONCE
Refills: 0 | Status: COMPLETED | OUTPATIENT
Start: 2023-12-27 | End: 2023-12-27

## 2023-12-27 RX ORDER — MONTELUKAST 4 MG/1
10 TABLET, CHEWABLE ORAL ONCE
Refills: 0 | Status: COMPLETED | OUTPATIENT
Start: 2023-12-27 | End: 2023-12-27

## 2023-12-27 RX ORDER — LORATADINE 10 MG/1
10 TABLET ORAL DAILY
Refills: 0 | Status: DISCONTINUED | OUTPATIENT
Start: 2023-12-27 | End: 2023-12-28

## 2023-12-27 RX ADMIN — Medication 25 MILLIGRAM(S): at 19:58

## 2023-12-27 RX ADMIN — HYDROMORPHONE HYDROCHLORIDE 1 MILLIGRAM(S): 2 INJECTION INTRAMUSCULAR; INTRAVENOUS; SUBCUTANEOUS at 17:15

## 2023-12-27 RX ADMIN — Medication 32 MILLIGRAM(S): at 12:01

## 2023-12-27 RX ADMIN — Medication 150 MICROGRAM(S): at 05:23

## 2023-12-27 RX ADMIN — Medication 200 MILLIGRAM(S): at 22:58

## 2023-12-27 RX ADMIN — Medication 650 MILLIGRAM(S): at 12:02

## 2023-12-27 RX ADMIN — Medication 1 MILLIGRAM(S): at 21:52

## 2023-12-27 RX ADMIN — BUDESONIDE AND FORMOTEROL FUMARATE DIHYDRATE 2 PUFF(S): 160; 4.5 AEROSOL RESPIRATORY (INHALATION) at 08:47

## 2023-12-27 RX ADMIN — Medication 50 MILLIGRAM(S): at 02:07

## 2023-12-27 RX ADMIN — Medication 650 MILLIGRAM(S): at 13:00

## 2023-12-27 RX ADMIN — HYDROMORPHONE HYDROCHLORIDE 0.5 MILLIGRAM(S): 2 INJECTION INTRAMUSCULAR; INTRAVENOUS; SUBCUTANEOUS at 06:46

## 2023-12-27 RX ADMIN — Medication 32 MILLIGRAM(S): at 02:07

## 2023-12-27 RX ADMIN — AZITHROMYCIN 255 MILLIGRAM(S): 500 TABLET, FILM COATED ORAL at 06:14

## 2023-12-27 RX ADMIN — TAMSULOSIN HYDROCHLORIDE 0.4 MILLIGRAM(S): 0.4 CAPSULE ORAL at 12:01

## 2023-12-27 RX ADMIN — CEFTRIAXONE 100 MILLIGRAM(S): 500 INJECTION, POWDER, FOR SOLUTION INTRAMUSCULAR; INTRAVENOUS at 05:23

## 2023-12-27 RX ADMIN — MONTELUKAST 10 MILLIGRAM(S): 4 TABLET, CHEWABLE ORAL at 22:58

## 2023-12-27 RX ADMIN — HYDROMORPHONE HYDROCHLORIDE 1 MILLIGRAM(S): 2 INJECTION INTRAMUSCULAR; INTRAVENOUS; SUBCUTANEOUS at 17:00

## 2023-12-27 RX ADMIN — BUDESONIDE AND FORMOTEROL FUMARATE DIHYDRATE 2 PUFF(S): 160; 4.5 AEROSOL RESPIRATORY (INHALATION) at 19:20

## 2023-12-27 RX ADMIN — ENOXAPARIN SODIUM 40 MILLIGRAM(S): 100 INJECTION SUBCUTANEOUS at 05:30

## 2023-12-27 RX ADMIN — Medication 50 MILLIGRAM(S): at 13:05

## 2023-12-27 RX ADMIN — HYDROMORPHONE HYDROCHLORIDE 0.5 MILLIGRAM(S): 2 INJECTION INTRAMUSCULAR; INTRAVENOUS; SUBCUTANEOUS at 05:24

## 2023-12-27 NOTE — CARE COORDINATION ASSESSMENT. - REASON FOR CONSULT
SW met with patient at bedside in order to conduct assessment and to discuss SW roles with good understanding./coordination of care/discharge planning/health care directive

## 2023-12-27 NOTE — CHART NOTE - NSCHARTNOTEFT_GEN_A_CORE
69y/o F with a PMHx of anxiety, hypothyroidism, asthma, kidney stones (s/p lithotripsy on 5/4/22) presents today complaining of lower abdominal pain. Admit for UTI, Hypokalemia, Non obstructing kidney stones and PNA.     Patient with documented history of IV contrast allergy, rash chest tightness, sob. Per patient she has tolerated IV contrast in the past last occurrence April 2023 at [facility]. Patient was premedicated at that time prior to CT, without incidence, denies c/b sob, chest tightness, rash. 69y/o F with a PMHx of anxiety, hypothyroidism, asthma, kidney stones (s/p lithotripsy on 5/4/22) presents today complaining of lower abdominal pain. Admit for UTI, Hypokalemia, Non obstructing kidney stones and PNA.     Patient with documented history of IV contrast allergy, rash chest tightness, sob. Per patient she has tolerated IV contrast in the past last occurrence April 2023 at [facility]. Patient was premedicated at that time prior to CT, without incidence, denies c/b sob, chest tightness, rash.

## 2023-12-27 NOTE — PROGRESS NOTE ADULT - SUBJECTIVE AND OBJECTIVE BOX
Patient is a 68y old  Female who presents with a chief complaint of PNA, UTI, Hypokalemia (27 Dec 2023 16:25)    INTERVAL HPI/OVERNIGHT EVENTS: Patient was seen and examined. Still with some abdominal pain/cramping. afebrile.     I&O's Summary    26 Dec 2023 07:01  -  27 Dec 2023 07:00  --------------------------------------------------------  IN: 350 mL / OUT: 0 mL / NET: 350 mL        LABS:                        11.2   7.08  )-----------( 172      ( 27 Dec 2023 06:40 )             34.1     12-27    140  |  113<H>  |  14  ----------------------------<  144<H>  4.3   |  22  |  0.36<L>    Ca    8.4<L>      27 Dec 2023 06:40    TPro  6.5  /  Alb  2.5<L>  /  TBili  0.3  /  DBili  x   /  AST  29  /  ALT  34  /  AlkPhos  52  12-27      Urinalysis Basic - ( 27 Dec 2023 06:40 )    Color: x / Appearance: x / SG: x / pH: x  Gluc: 144 mg/dL / Ketone: x  / Bili: x / Urobili: x   Blood: x / Protein: x / Nitrite: x   Leuk Esterase: x / RBC: x / WBC x   Sq Epi: x / Non Sq Epi: x / Bacteria: x      CAPILLARY BLOOD GLUCOSE            Urinalysis Basic - ( 27 Dec 2023 06:40 )    Color: x / Appearance: x / SG: x / pH: x  Gluc: 144 mg/dL / Ketone: x  / Bili: x / Urobili: x   Blood: x / Protein: x / Nitrite: x   Leuk Esterase: x / RBC: x / WBC x   Sq Epi: x / Non Sq Epi: x / Bacteria: x        MEDICATIONS  (STANDING):  azithromycin  IVPB 500 milliGRAM(s) IV Intermittent every 24 hours  budesonide 160 MICROgram(s)/formoterol 4.5 MICROgram(s) Inhaler 2 Puff(s) Inhalation two times a day  cefTRIAXone   IVPB 1000 milliGRAM(s) IV Intermittent every 24 hours  clonazePAM  Tablet 1 milliGRAM(s) Oral at bedtime  enoxaparin Injectable 40 milliGRAM(s) SubCutaneous every 24 hours  levothyroxine 150 MICROGram(s) Oral daily  sodium chloride 0.9%. 1000 milliLiter(s) (50 mL/Hr) IV Continuous <Continuous>  tamsulosin 0.4 milliGRAM(s) Oral daily    MEDICATIONS  (PRN):  acetaminophen     Tablet .. 650 milliGRAM(s) Oral every 6 hours PRN Temp greater or equal to 38C (100.4F), Mild Pain (1 - 3)  albuterol    90 MICROgram(s) HFA Inhaler 1 Puff(s) Inhalation every 6 hours PRN Shortness of Breath and/or Wheezing  aluminum hydroxide/magnesium hydroxide/simethicone Suspension 30 milliLiter(s) Oral every 4 hours PRN Dyspepsia  HYDROmorphone  Injectable 0.5 milliGRAM(s) IV Push every 4 hours PRN Moderate Pain (4 - 6)  HYDROmorphone  Injectable 1 milliGRAM(s) IV Push every 4 hours PRN Severe Pain (7 - 10)  melatonin 3 milliGRAM(s) Oral at bedtime PRN Insomnia  ondansetron Injectable 4 milliGRAM(s) IV Push every 8 hours PRN Nausea and/or Vomiting      REVIEW OF SYSTEMS:  CONSTITUTIONAL: No fever or chills  HEENT:  No headache, no sore throat  RESPIRATORY: No cough, wheezing, or shortness of breath  CARDIOVASCULAR: No chest pain, palpitations  GASTROINTESTINAL: + abdominal pain, No nausea, vomiting, or diarrhea  GENITOURINARY: No dysuria, frequency, or hematuria  NEUROLOGICAL: no focal weakness or dizziness  MUSCULOSKELETAL: no myalgias  PSYCH: no recent changes in mood    RADIOLOGY & ADDITIONAL TESTS:    Imaging Personally Reviewed:  [x] YES  [ ] NO    Consultant(s) Notes Reviewed:  [x] YES  [ ] NO    PHYSICAL EXAM:  T(C): 36.5 (12-27-23 @ 05:10), Max: 36.6 (12-26-23 @ 20:22)  HR: 60 (12-27-23 @ 05:10) (56 - 65)  BP: 111/58 (12-27-23 @ 05:10) (111/58 - 117/65)  RR: 18 (12-27-23 @ 05:10) (18 - 18)  SpO2: 91% (12-27-23 @ 05:10) (91% - 95%)    GENERAL: NAD, well-developed, well-groomed  HEENT:  anicteric, moist mucous membranes  CHEST/LUNG:  CTA b/l, no rales, wheezes, or rhonchi  HEART:  RRR, S1, S2  ABDOMEN:  BS+, soft, nontender, nondistended  EXTREMITIES: no edema, cyanosis, or calf tenderness  NERVOUS SYSTEM: answers questions and follows commands appropriately  PSYCH: normal affect    Care Discussed with Consultants/Other Providers [x] YES  [ ] NO

## 2023-12-27 NOTE — CARE COORDINATION ASSESSMENT. - NSDCPLANSERVICES_GEN_ALL_CORE
Patient independent with ADLS, no anticipated dc needs./Infusion Therapy/No Anticipated Discharge Needs

## 2023-12-27 NOTE — CHART NOTE - NSCHARTNOTEFT_GEN_A_CORE
Called by RN for pt requesting home medication. Per pt, takes tessalon perles 200mg, zyrtec, and singulair 10mg every night. Per chart review, see pt prescribed singulair and tessalon perles. Ordered one time dose of tessalon perles 200mg, zyrtec and singulair 10mg. Day team to follow up with deciding to continue home medications.

## 2023-12-27 NOTE — PROGRESS NOTE ADULT - SUBJECTIVE AND OBJECTIVE BOX
[facility]  INFECTIOUS DISEASES   [address] [place] [address]  Tel: [phone]     Fax: [phone]  ========================================================  MD Sharda Lovelace Kaushal, MD Cho, Michelle, MD Sunjit, Jaspal, MD  ========================================================    [id number]  MARI ROBERTO     Follow up: Pneumonia    Still coughing with some sputum, still with abdominal pain but no obstructing renal stones. No urinary symptoms.   No fever.     PAST MEDICAL & SURGICAL HISTORY:  Hypothyroidism  Migraines  Anxiety  Kidney Calculi  Obesity  Fibromyalgia  Kidney Calculus- Lithotripsy x 3  S/P Hysterectomy   Delivery  S/P Total Thyroidectomy  History of Appendectomy  Asthma  S/P Laparoscopic Cholecystectomy  History of lumbar spinal fusion    Social Hx: No smoking, EtOH or drugs     FAMILY HISTORY:  FH: emphysema (Father)    FH: heart attack (Father)    Allergies  morphine (Nausea; Vomiting)  latex (Anaphylaxis)  penicillin (Hives)  codeine (Anaphylaxis; Hives)  fish (Anaphylaxis)    Antibiotics:  MEDICATIONS  (STANDING):  budesonide 160 MICROgram(s)/formoterol 4.5 MICROgram(s) Inhaler 2 Puff(s) Inhalation two times a day  clonazePAM  Tablet 1 milliGRAM(s) Oral at bedtime  enoxaparin Injectable 40 milliGRAM(s) SubCutaneous every 24 hours  levothyroxine 150 MICROGram(s) Oral daily  potassium chloride    Tablet ER 40 milliEquivalent(s) Oral every 4 hours  sodium chloride 0.9%. 1000 milliLiter(s) (50 mL/Hr) IV Continuous <Continuous>  tamsulosin 0.4 milliGRAM(s) Oral daily    MEDICATIONS  (PRN):  acetaminophen     Tablet .. 650 milliGRAM(s) Oral every 6 hours PRN Temp greater or equal to 38C (100.4F), Mild Pain (1 - 3)  albuterol    90 MICROgram(s) HFA Inhaler 1 Puff(s) Inhalation every 6 hours PRN Shortness of Breath and/or Wheezing  aluminum hydroxide/magnesium hydroxide/simethicone Suspension 30 milliLiter(s) Oral every 4 hours PRN Dyspepsia  HYDROmorphone  Injectable 1 milliGRAM(s) IV Push every 4 hours PRN Severe Pain (7 - 10)  HYDROmorphone  Injectable 0.5 milliGRAM(s) IV Push every 4 hours PRN Moderate Pain (4 - 6)  melatonin 3 milliGRAM(s) Oral at bedtime PRN Insomnia  ondansetron Injectable 4 milliGRAM(s) IV Push every 8 hours PRN Nausea and/or Vomiting     REVIEW OF SYSTEMS:  CONSTITUTIONAL:  No Fever or chills  HEENT:  No diplopia or blurred vision.  No sore throat or runny nose.  CARDIOVASCULAR:  No chest pain or SOB.  RESPIRATORY:  No cough, shortness of breath, PND or orthopnea.  GASTROINTESTINAL:  No nausea, vomiting or diarrhea.  GENITOURINARY:  No dysuria, frequency or urgency. No Blood in urine  MUSCULOSKELETAL:  no joint aches, no muscle pain  SKIN:  No change in skin, hair or nails.  NEUROLOGIC:  No paresthesias or weakness.  PSYCHIATRIC:  No disorder of thought or mood.  ENDOCRINE:  No heat or cold intolerance, polyuria or polydipsia.  HEMATOLOGICAL:  No easy bruising or bleeding.     Physical Exam:  Vital Signs Last 24 Hrs  T(C): 36.5 (27 Dec 2023 05:10), Max: 36.8 (26 Dec 2023 16:52)  T(F): 97.7 (27 Dec 2023 05:10), Max: 98.2 (26 Dec 2023 16:52)  HR: 60 (27 Dec 2023 05:10) (56 - 65)  BP: 111/58 (27 Dec 2023 05:10) (109/63 - 117/65)  BP(mean): --  RR: 18 (27 Dec 2023 05:10) (18 - 18)  SpO2: 91% (27 Dec 2023 05:10) (91% - 96%)  Parameters below as of 27 Dec 2023 05:10  Patient On (Oxygen Delivery Method): room air  GEN: NAD  HEENT: normocephalic and atraumatic. EOMI. PERRL.    NECK: Supple.  No lymphadenopathy   LUNGS: Crackles in both bases   HEART: Regular rate and rhythm without murmur.  ABDOMEN: Soft, nondistended.  Positive bowel sounds.    Mild lower abdominal pain   : No CVA tenderness  EXTREMITIES: Without edema.  NEUROLOGIC: grossly intact.  PSYCHIATRIC: Appropriate affect .  SKIN: No rash     Labs:                        11.2   7.08  )-----------( 172      ( 27 Dec 2023 06:40 )             34.1         140  |  113<H>  |  14  ----------------------------<  144<H>  4.3   |  22  |  0.36<L>    Ca    8.4<L>      27 Dec 2023 06:40    TPro  6.5  /  Alb  2.5<L>  /  TBili  0.3  /  DBili  x   /  AST  29  /  ALT  34  /  AlkPhos  52      Culture - Blood (collected 23 @ 05:33)  Source: .Blood Blood-Peripheral  Preliminary Report (23 @ 09:02):    No growth at 24 hours    Culture - Blood (collected 23 @ 05:30)  Source: .Blood Blood-Peripheral  Preliminary Report (23 @ 09:02):    No growth at 24 hours    Culture - Urine (collected 22 @ 12:33)  Source: Clean Catch Clean Catch (Midstream)  Final Report (22 @ 11:19):    <10,000 CFU/mL Normal Urogenital Katarina    WBC Count: 7.08 K/uL (23 @ 06:40)  WBC Count: 10.98 K/uL (23 @ 05:33)  WBC Count: 14.36 K/uL (23 @ 21:40)    Creatinine: 0.36 mg/dL (23 @ 06:40)  Creatinine: 0.49 mg/dL (23 @ 05:33)  Creatinine: 0.65 mg/dL (23 @ 21:40)    SARS-CoV-2 Result: NotDetec (23 @ 02:35)  SARS-CoV-2: NotDetec (23 @ 02:35)    All imaging and other data have been reviewed.  < from: CT Abdomen and Pelvis No Cont (12.26.23 @ 02:03) >  IMPRESSION:  Exam limited by noncontrast technique and by streak artifact from hip   hardware and spinal hardware. No acute intra-abdominal findings within   these limitations.  Small patchy opacities in the right lower lobe suspicious for pneumonia.   Recommend clinical correlation and follow-up chest CT imaging in 6-8   weeks to document resolution.      Assessment and Plan:   69y/o woman with PMH of anxiety, hypothyroidism, asthma, kidney stones s/p lithotripsy on 22 and diverticulitis in  presents with lower abdominal pain.   CT with no pathology but it was noncontrast and also artifact form hip and spine hardware made it difficult to say in details. Lower chest with pneumonia.  No urinary symptoms, doubt to have a UTI.   WBC 14k on admission   Abdominal pain can happen with pneumonia based on repeat CT with cont no obstructing stones, CT chest with pneumonia.     # Abdominal pain   # Pneumonia     - Blood and urine cultures NGTD   - Will monitor WBC 14-->7 back to normal   - Continue Ceftriaxone  - Legionella U ag negative can stop azithromycin   - Urology consult noted     Will follow.    Richard Lizarraga MD  Division of Infectious Diseases   Please call ID service at [phone] with any question.    50 minutes spent on total encounter assessing patient, examination, chart review, counseling and coordinating care by the attending physician/nurse/care manager.   [facility]  INFECTIOUS DISEASES   [address] [place] [address]  Tel: [phone]     Fax: [phone]  ========================================================  MD Sharda Lovelace Kaushal, MD Cho, Michelle, MD Sunjit, Jaspal, MD  ========================================================    [id number]  MARI ROBERTO     Follow up: Pneumonia    Still coughing with some sputum, still with abdominal pain but no obstructing renal stones. No urinary symptoms.   No fever.     PAST MEDICAL & SURGICAL HISTORY:  Hypothyroidism  Migraines  Anxiety  Kidney Calculi  Obesity  Fibromyalgia  Kidney Calculus- Lithotripsy x 3  S/P Hysterectomy   Delivery  S/P Total Thyroidectomy  History of Appendectomy  Asthma  S/P Laparoscopic Cholecystectomy  History of lumbar spinal fusion    Social Hx: No smoking, EtOH or drugs     FAMILY HISTORY:  FH: emphysema (Father)    FH: heart attack (Father)    Allergies  morphine (Nausea; Vomiting)  latex (Anaphylaxis)  penicillin (Hives)  codeine (Anaphylaxis; Hives)  fish (Anaphylaxis)    Antibiotics:  MEDICATIONS  (STANDING):  budesonide 160 MICROgram(s)/formoterol 4.5 MICROgram(s) Inhaler 2 Puff(s) Inhalation two times a day  clonazePAM  Tablet 1 milliGRAM(s) Oral at bedtime  enoxaparin Injectable 40 milliGRAM(s) SubCutaneous every 24 hours  levothyroxine 150 MICROGram(s) Oral daily  potassium chloride    Tablet ER 40 milliEquivalent(s) Oral every 4 hours  sodium chloride 0.9%. 1000 milliLiter(s) (50 mL/Hr) IV Continuous <Continuous>  tamsulosin 0.4 milliGRAM(s) Oral daily    MEDICATIONS  (PRN):  acetaminophen     Tablet .. 650 milliGRAM(s) Oral every 6 hours PRN Temp greater or equal to 38C (100.4F), Mild Pain (1 - 3)  albuterol    90 MICROgram(s) HFA Inhaler 1 Puff(s) Inhalation every 6 hours PRN Shortness of Breath and/or Wheezing  aluminum hydroxide/magnesium hydroxide/simethicone Suspension 30 milliLiter(s) Oral every 4 hours PRN Dyspepsia  HYDROmorphone  Injectable 1 milliGRAM(s) IV Push every 4 hours PRN Severe Pain (7 - 10)  HYDROmorphone  Injectable 0.5 milliGRAM(s) IV Push every 4 hours PRN Moderate Pain (4 - 6)  melatonin 3 milliGRAM(s) Oral at bedtime PRN Insomnia  ondansetron Injectable 4 milliGRAM(s) IV Push every 8 hours PRN Nausea and/or Vomiting     REVIEW OF SYSTEMS:  CONSTITUTIONAL:  No Fever or chills  HEENT:  No diplopia or blurred vision.  No sore throat or runny nose.  CARDIOVASCULAR:  No chest pain or SOB.  RESPIRATORY:  No cough, shortness of breath, PND or orthopnea.  GASTROINTESTINAL:  No nausea, vomiting or diarrhea.  GENITOURINARY:  No dysuria, frequency or urgency. No Blood in urine  MUSCULOSKELETAL:  no joint aches, no muscle pain  SKIN:  No change in skin, hair or nails.  NEUROLOGIC:  No paresthesias or weakness.  PSYCHIATRIC:  No disorder of thought or mood.  ENDOCRINE:  No heat or cold intolerance, polyuria or polydipsia.  HEMATOLOGICAL:  No easy bruising or bleeding.     Physical Exam:  Vital Signs Last 24 Hrs  T(C): 36.5 (27 Dec 2023 05:10), Max: 36.8 (26 Dec 2023 16:52)  T(F): 97.7 (27 Dec 2023 05:10), Max: 98.2 (26 Dec 2023 16:52)  HR: 60 (27 Dec 2023 05:10) (56 - 65)  BP: 111/58 (27 Dec 2023 05:10) (109/63 - 117/65)  BP(mean): --  RR: 18 (27 Dec 2023 05:10) (18 - 18)  SpO2: 91% (27 Dec 2023 05:10) (91% - 96%)  Parameters below as of 27 Dec 2023 05:10  Patient On (Oxygen Delivery Method): room air  GEN: NAD  HEENT: normocephalic and atraumatic. EOMI. PERRL.    NECK: Supple.  No lymphadenopathy   LUNGS: Crackles in both bases   HEART: Regular rate and rhythm without murmur.  ABDOMEN: Soft, nondistended.  Positive bowel sounds.    Mild lower abdominal pain   : No CVA tenderness  EXTREMITIES: Without edema.  NEUROLOGIC: grossly intact.  PSYCHIATRIC: Appropriate affect .  SKIN: No rash     Labs:                        11.2   7.08  )-----------( 172      ( 27 Dec 2023 06:40 )             34.1         140  |  113<H>  |  14  ----------------------------<  144<H>  4.3   |  22  |  0.36<L>    Ca    8.4<L>      27 Dec 2023 06:40    TPro  6.5  /  Alb  2.5<L>  /  TBili  0.3  /  DBili  x   /  AST  29  /  ALT  34  /  AlkPhos  52      Culture - Blood (collected 23 @ 05:33)  Source: .Blood Blood-Peripheral  Preliminary Report (23 @ 09:02):    No growth at 24 hours    Culture - Blood (collected 23 @ 05:30)  Source: .Blood Blood-Peripheral  Preliminary Report (23 @ 09:02):    No growth at 24 hours    Culture - Urine (collected 22 @ 12:33)  Source: Clean Catch Clean Catch (Midstream)  Final Report (22 @ 11:19):    <10,000 CFU/mL Normal Urogenital Katarina    WBC Count: 7.08 K/uL (23 @ 06:40)  WBC Count: 10.98 K/uL (23 @ 05:33)  WBC Count: 14.36 K/uL (23 @ 21:40)    Creatinine: 0.36 mg/dL (23 @ 06:40)  Creatinine: 0.49 mg/dL (23 @ 05:33)  Creatinine: 0.65 mg/dL (23 @ 21:40)    SARS-CoV-2 Result: NotDetec (23 @ 02:35)  SARS-CoV-2: NotDetec (23 @ 02:35)    All imaging and other data have been reviewed.  < from: CT Abdomen and Pelvis No Cont (12.26.23 @ 02:03) >  IMPRESSION:  Exam limited by noncontrast technique and by streak artifact from hip   hardware and spinal hardware. No acute intra-abdominal findings within   these limitations.  Small patchy opacities in the right lower lobe suspicious for pneumonia.   Recommend clinical correlation and follow-up chest CT imaging in 6-8   weeks to document resolution.      Assessment and Plan:   67y/o woman with PMH of anxiety, hypothyroidism, asthma, kidney stones s/p lithotripsy on 22 and diverticulitis in  presents with lower abdominal pain.   CT with no pathology but it was noncontrast and also artifact form hip and spine hardware made it difficult to say in details. Lower chest with pneumonia.  No urinary symptoms, doubt to have a UTI.   WBC 14k on admission   Abdominal pain can happen with pneumonia based on repeat CT with cont no obstructing stones, CT chest with pneumonia.     # Abdominal pain   # Pneumonia     - Blood and urine cultures NGTD   - Will monitor WBC 14-->7 back to normal   - Continue Ceftriaxone  - Legionella U ag negative can stop azithromycin   - Urology consult noted     Will follow.    Richard Lizarraga MD  Division of Infectious Diseases   Please call ID service at [phone] with any question.    50 minutes spent on total encounter assessing patient, examination, chart review, counseling and coordinating care by the attending physician/nurse/care manager.

## 2023-12-27 NOTE — CARE COORDINATION ASSESSMENT. - MET/SPOKE WITH
Pt's spouse/Schuyler Velasquez can be reached at ([phone])/spouse Pt's spouse/Schuyler Velasquez can be reached at ([phone])/spouse

## 2023-12-27 NOTE — CHART NOTE - NSCHARTNOTEFT_GEN_A_CORE
Called by RN for pt describing anaphylactic allergy to IV contrast. Per day teams orders, gave benadryl and solumedrol to pre-medicate pt for CT chest, Ab/pelv w/ IV contrast given allergy (hives). However, per RN, pt described an anaphylactic-like allergy to IV contrast in the past. RN clarified w/ the pt her reaction to contrast, pt stated that "her throat got a little tight, but it was mostly hives." Pt currently with mild lower abdominal pain. Given the risks, will hold off on CT scans. Day team to reassess need for CT scan w/ contrast.

## 2023-12-27 NOTE — PROGRESS NOTE ADULT - ASSESSMENT
67y/o F with a PMHx of anxiety, hypothyroidism, asthma, kidney stones (s/p lithotripsy on 5/4/22) presents today complaining of lower abdominal pain. Admit for UTI, Hypokalemia, Non obstructing kidney stones and PNA.     PNA    - cont rocephin. azithromycin discontinued     - ID following     - blood cultures neg     - seen by urology. no further workup at this time. cr stable.     hypothyroidism    - cont synthroid     DVT proph: lovenox 40mg daily          69y/o F with a PMHx of anxiety, hypothyroidism, asthma, kidney stones (s/p lithotripsy on 5/4/22) presents today complaining of lower abdominal pain. Admit for UTI, Hypokalemia, Non obstructing kidney stones and PNA.     PNA    - cont rocephin. azithromycin discontinued     - ID following     - blood cultures neg     - seen by urology. no further workup at this time. cr stable.     hypothyroidism    - cont synthroid     DVT proph: lovenox 40mg daily

## 2023-12-27 NOTE — CARE COORDINATION ASSESSMENT. - PRO ARRIVE FROM
Pt lives in a private home with her spouse. The patient mentioned that she has to negotiate 2 steps to get inside her home./home

## 2023-12-27 NOTE — CARE COORDINATION ASSESSMENT. - NSPASTMEDSURGHISTORY_GEN_ALL_CORE_FT
PAST MEDICAL & SURGICAL HISTORY:  S/P Laparoscopic Cholecystectomy      Asthma      Fibromyalgia      Obesity      Kidney Calculi      Anxiety      Migraines      Hypothyroidism      History of Appendectomy      S/P Total Thyroidectomy       Delivery      S/P Hysterectomy      Kidney Calculus- Lithotripsy x 3      History of lumbar spinal fusion

## 2023-12-27 NOTE — CARE COORDINATION ASSESSMENT. - NSCAREPROVIDERS_GEN_ALL_CORE_FT
CARE PROVIDERS:  Accepting Physician: Marcus Vail  Administration: Ryan Koch  Administration: Yariel Gonzalez  Administration: Bonifacio Mcdermott  Admitting: Marcus Vail  Attending: Rose Mary Elizabeth  Case Management: Ashley Gaytan  Consultant: Festus Daigle  Consultant: Richard Lizarraga  Consultant: Graciela Rodriguez  Covering Team: Valentina Echeverria  Covering Team: Noam Mcgrath  ED ACP: Korey Barkley  ED Attending: Paco Murphy  ED Nurse: Valentina Ambriz  Nurse: Karla Bui  Ordered: ADM, User  Ordered: Doctor, Unknown  PCA/Nursing Assistant: Teetee Montero  Physical Therapy: Cindy Wu  Primary Team: Goldie Zee  Primary Team: Rose Mary Elizabeth  Primary Team: Emil Zamudio  Registered Dietitian: Khushbu Mccloud  : Huma Woods  Team: [facility], Team

## 2023-12-28 ENCOUNTER — TRANSCRIPTION ENCOUNTER (OUTPATIENT)
Age: 68
End: 2023-12-28

## 2023-12-28 VITALS
OXYGEN SATURATION: 93 % | DIASTOLIC BLOOD PRESSURE: 54 MMHG | SYSTOLIC BLOOD PRESSURE: 102 MMHG | TEMPERATURE: 99 F | RESPIRATION RATE: 17 BRPM | HEART RATE: 75 BPM

## 2023-12-28 LAB
ALBUMIN SERPL ELPH-MCNC: 2.9 G/DL — LOW (ref 3.3–5)
ALBUMIN SERPL ELPH-MCNC: 2.9 G/DL — LOW (ref 3.3–5)
ALP SERPL-CCNC: 62 U/L — SIGNIFICANT CHANGE UP (ref 40–120)
ALP SERPL-CCNC: 62 U/L — SIGNIFICANT CHANGE UP (ref 40–120)
ALT FLD-CCNC: 30 U/L — SIGNIFICANT CHANGE UP (ref 12–78)
ALT FLD-CCNC: 30 U/L — SIGNIFICANT CHANGE UP (ref 12–78)
ANION GAP SERPL CALC-SCNC: 4 MMOL/L — LOW (ref 5–17)
ANION GAP SERPL CALC-SCNC: 4 MMOL/L — LOW (ref 5–17)
AST SERPL-CCNC: 13 U/L — LOW (ref 15–37)
AST SERPL-CCNC: 13 U/L — LOW (ref 15–37)
BILIRUB SERPL-MCNC: 0.2 MG/DL — SIGNIFICANT CHANGE UP (ref 0.2–1.2)
BILIRUB SERPL-MCNC: 0.2 MG/DL — SIGNIFICANT CHANGE UP (ref 0.2–1.2)
BUN SERPL-MCNC: 17 MG/DL — SIGNIFICANT CHANGE UP (ref 7–23)
BUN SERPL-MCNC: 17 MG/DL — SIGNIFICANT CHANGE UP (ref 7–23)
CALCIUM SERPL-MCNC: 8.9 MG/DL — SIGNIFICANT CHANGE UP (ref 8.5–10.1)
CALCIUM SERPL-MCNC: 8.9 MG/DL — SIGNIFICANT CHANGE UP (ref 8.5–10.1)
CHLORIDE SERPL-SCNC: 109 MMOL/L — HIGH (ref 96–108)
CHLORIDE SERPL-SCNC: 109 MMOL/L — HIGH (ref 96–108)
CO2 SERPL-SCNC: 27 MMOL/L — SIGNIFICANT CHANGE UP (ref 22–31)
CO2 SERPL-SCNC: 27 MMOL/L — SIGNIFICANT CHANGE UP (ref 22–31)
CREAT SERPL-MCNC: 0.52 MG/DL — SIGNIFICANT CHANGE UP (ref 0.5–1.3)
CREAT SERPL-MCNC: 0.52 MG/DL — SIGNIFICANT CHANGE UP (ref 0.5–1.3)
CULTURE RESULTS: SIGNIFICANT CHANGE UP
CULTURE RESULTS: SIGNIFICANT CHANGE UP
EGFR: 102 ML/MIN/1.73M2 — SIGNIFICANT CHANGE UP
EGFR: 102 ML/MIN/1.73M2 — SIGNIFICANT CHANGE UP
GLUCOSE SERPL-MCNC: 95 MG/DL — SIGNIFICANT CHANGE UP (ref 70–99)
GLUCOSE SERPL-MCNC: 95 MG/DL — SIGNIFICANT CHANGE UP (ref 70–99)
HCT VFR BLD CALC: 34.6 % — SIGNIFICANT CHANGE UP (ref 34.5–45)
HCT VFR BLD CALC: 34.6 % — SIGNIFICANT CHANGE UP (ref 34.5–45)
HGB BLD-MCNC: 11.3 G/DL — LOW (ref 11.5–15.5)
HGB BLD-MCNC: 11.3 G/DL — LOW (ref 11.5–15.5)
MCHC RBC-ENTMCNC: 29.2 PG — SIGNIFICANT CHANGE UP (ref 27–34)
MCHC RBC-ENTMCNC: 29.2 PG — SIGNIFICANT CHANGE UP (ref 27–34)
MCHC RBC-ENTMCNC: 32.7 GM/DL — SIGNIFICANT CHANGE UP (ref 32–36)
MCHC RBC-ENTMCNC: 32.7 GM/DL — SIGNIFICANT CHANGE UP (ref 32–36)
MCV RBC AUTO: 89.4 FL — SIGNIFICANT CHANGE UP (ref 80–100)
MCV RBC AUTO: 89.4 FL — SIGNIFICANT CHANGE UP (ref 80–100)
NRBC # BLD: 0 /100 WBCS — SIGNIFICANT CHANGE UP (ref 0–0)
NRBC # BLD: 0 /100 WBCS — SIGNIFICANT CHANGE UP (ref 0–0)
PLATELET # BLD AUTO: 203 K/UL — SIGNIFICANT CHANGE UP (ref 150–400)
PLATELET # BLD AUTO: 203 K/UL — SIGNIFICANT CHANGE UP (ref 150–400)
POTASSIUM SERPL-MCNC: 3.5 MMOL/L — SIGNIFICANT CHANGE UP (ref 3.5–5.3)
POTASSIUM SERPL-MCNC: 3.5 MMOL/L — SIGNIFICANT CHANGE UP (ref 3.5–5.3)
POTASSIUM SERPL-SCNC: 3.5 MMOL/L — SIGNIFICANT CHANGE UP (ref 3.5–5.3)
POTASSIUM SERPL-SCNC: 3.5 MMOL/L — SIGNIFICANT CHANGE UP (ref 3.5–5.3)
PROT SERPL-MCNC: 6.9 G/DL — SIGNIFICANT CHANGE UP (ref 6–8.3)
PROT SERPL-MCNC: 6.9 G/DL — SIGNIFICANT CHANGE UP (ref 6–8.3)
RBC # BLD: 3.87 M/UL — SIGNIFICANT CHANGE UP (ref 3.8–5.2)
RBC # BLD: 3.87 M/UL — SIGNIFICANT CHANGE UP (ref 3.8–5.2)
RBC # FLD: 15.1 % — HIGH (ref 10.3–14.5)
RBC # FLD: 15.1 % — HIGH (ref 10.3–14.5)
SODIUM SERPL-SCNC: 140 MMOL/L — SIGNIFICANT CHANGE UP (ref 135–145)
SODIUM SERPL-SCNC: 140 MMOL/L — SIGNIFICANT CHANGE UP (ref 135–145)
SPECIMEN SOURCE: SIGNIFICANT CHANGE UP
SPECIMEN SOURCE: SIGNIFICANT CHANGE UP
WBC # BLD: 12.28 K/UL — HIGH (ref 3.8–10.5)
WBC # BLD: 12.28 K/UL — HIGH (ref 3.8–10.5)
WBC # FLD AUTO: 12.28 K/UL — HIGH (ref 3.8–10.5)
WBC # FLD AUTO: 12.28 K/UL — HIGH (ref 3.8–10.5)

## 2023-12-28 PROCEDURE — 94640 AIRWAY INHALATION TREATMENT: CPT

## 2023-12-28 PROCEDURE — 81001 URINALYSIS AUTO W/SCOPE: CPT

## 2023-12-28 PROCEDURE — 99232 SBSQ HOSP IP/OBS MODERATE 35: CPT

## 2023-12-28 PROCEDURE — 83605 ASSAY OF LACTIC ACID: CPT

## 2023-12-28 PROCEDURE — 71260 CT THORAX DX C+: CPT

## 2023-12-28 PROCEDURE — 87449 NOS EACH ORGANISM AG IA: CPT

## 2023-12-28 PROCEDURE — 87040 BLOOD CULTURE FOR BACTERIA: CPT

## 2023-12-28 PROCEDURE — 83690 ASSAY OF LIPASE: CPT

## 2023-12-28 PROCEDURE — 87637 SARSCOV2&INF A&B&RSV AMP PRB: CPT

## 2023-12-28 PROCEDURE — 87899 AGENT NOS ASSAY W/OPTIC: CPT

## 2023-12-28 PROCEDURE — 93005 ELECTROCARDIOGRAM TRACING: CPT

## 2023-12-28 PROCEDURE — 74176 CT ABD & PELVIS W/O CONTRAST: CPT | Mod: QQ

## 2023-12-28 PROCEDURE — 96376 TX/PRO/DX INJ SAME DRUG ADON: CPT

## 2023-12-28 PROCEDURE — 85027 COMPLETE CBC AUTOMATED: CPT

## 2023-12-28 PROCEDURE — 80053 COMPREHEN METABOLIC PANEL: CPT

## 2023-12-28 PROCEDURE — 85025 COMPLETE CBC W/AUTO DIFF WBC: CPT

## 2023-12-28 PROCEDURE — 96374 THER/PROPH/DIAG INJ IV PUSH: CPT

## 2023-12-28 PROCEDURE — 87086 URINE CULTURE/COLONY COUNT: CPT

## 2023-12-28 PROCEDURE — 74177 CT ABD & PELVIS W/CONTRAST: CPT

## 2023-12-28 PROCEDURE — 71045 X-RAY EXAM CHEST 1 VIEW: CPT

## 2023-12-28 PROCEDURE — 36415 COLL VENOUS BLD VENIPUNCTURE: CPT

## 2023-12-28 PROCEDURE — 99285 EMERGENCY DEPT VISIT HI MDM: CPT | Mod: 25

## 2023-12-28 PROCEDURE — 99239 HOSP IP/OBS DSCHRG MGMT >30: CPT

## 2023-12-28 PROCEDURE — 0225U NFCT DS DNA&RNA 21 SARSCOV2: CPT

## 2023-12-28 PROCEDURE — 96375 TX/PRO/DX INJ NEW DRUG ADDON: CPT

## 2023-12-28 RX ORDER — LEVOTHYROXINE SODIUM 125 MCG
1 TABLET ORAL
Qty: 0 | Refills: 0 | DISCHARGE
Start: 2023-12-28

## 2023-12-28 RX ORDER — MONTELUKAST 4 MG/1
10 TABLET, CHEWABLE ORAL AT BEDTIME
Refills: 0 | Status: DISCONTINUED | OUTPATIENT
Start: 2023-12-28 | End: 2023-12-28

## 2023-12-28 RX ORDER — TAMSULOSIN HYDROCHLORIDE 0.4 MG/1
1 CAPSULE ORAL
Qty: 0 | Refills: 0 | DISCHARGE
Start: 2023-12-28

## 2023-12-28 RX ORDER — MONTELUKAST 4 MG/1
1 TABLET, CHEWABLE ORAL
Qty: 0 | Refills: 0 | DISCHARGE
Start: 2023-12-28

## 2023-12-28 RX ORDER — ALBUTEROL 90 UG/1
1 AEROSOL, METERED ORAL
Qty: 0 | Refills: 0 | DISCHARGE
Start: 2023-12-28

## 2023-12-28 RX ORDER — LORATADINE 10 MG/1
1 TABLET ORAL
Qty: 0 | Refills: 0 | DISCHARGE
Start: 2023-12-28

## 2023-12-28 RX ORDER — BUDESONIDE AND FORMOTEROL FUMARATE DIHYDRATE 160; 4.5 UG/1; UG/1
2 AEROSOL RESPIRATORY (INHALATION)
Qty: 0 | Refills: 0 | DISCHARGE
Start: 2023-12-28

## 2023-12-28 RX ADMIN — ENOXAPARIN SODIUM 40 MILLIGRAM(S): 100 INJECTION SUBCUTANEOUS at 05:57

## 2023-12-28 RX ADMIN — ALBUTEROL 1 PUFF(S): 90 AEROSOL, METERED ORAL at 05:47

## 2023-12-28 RX ADMIN — HYDROMORPHONE HYDROCHLORIDE 1 MILLIGRAM(S): 2 INJECTION INTRAMUSCULAR; INTRAVENOUS; SUBCUTANEOUS at 05:42

## 2023-12-28 RX ADMIN — LORATADINE 10 MILLIGRAM(S): 10 TABLET ORAL at 11:57

## 2023-12-28 RX ADMIN — Medication 25 MILLIGRAM(S): at 05:45

## 2023-12-28 RX ADMIN — CEFTRIAXONE 100 MILLIGRAM(S): 500 INJECTION, POWDER, FOR SOLUTION INTRAMUSCULAR; INTRAVENOUS at 05:58

## 2023-12-28 RX ADMIN — Medication 150 MICROGRAM(S): at 05:37

## 2023-12-28 RX ADMIN — BUDESONIDE AND FORMOTEROL FUMARATE DIHYDRATE 2 PUFF(S): 160; 4.5 AEROSOL RESPIRATORY (INHALATION) at 06:58

## 2023-12-28 RX ADMIN — HYDROMORPHONE HYDROCHLORIDE 1 MILLIGRAM(S): 2 INJECTION INTRAMUSCULAR; INTRAVENOUS; SUBCUTANEOUS at 02:24

## 2023-12-28 NOTE — PROVIDER CONTACT NOTE (MEDICATION) - SITUATION
Patient Antibiotic was infusing as ordered via left hand 22 gauge site with no complications noted. Patient daughter noted wetness to clothing and brought it to RN attention.  IV site was flushed and no leakage noted.  However, most of the medication was already and the remainder was stopped to change site.

## 2023-12-28 NOTE — DISCHARGE NOTE NURSING/CASE MANAGEMENT/SOCIAL WORK - NSDCPEFALRISK_GEN_ALL_CORE
For information on Fall & Injury Prevention, visit: [email or web address] OR  [email or web address] OR  https://www.cdc.gov/steadi/patient.html For information on Fall & Injury Prevention, visit: [email or web address] OR  [email or web address] OR  https://www.cdc.gov/steadi/patient.html

## 2023-12-28 NOTE — PROGRESS NOTE ADULT - SUBJECTIVE AND OBJECTIVE BOX
[facility]  INFECTIOUS DISEASES   [address] [place] [address]  Tel: [phone]     Fax: [phone]  ========================================================  MD Sharda Lovelace Kaushal, MD Cho, Michelle, MD Sunjit, Jaspal, MD  ========================================================    [id number]  MARI ROBERTO     Follow up: Pneumonia    + cough, abdominal pain improving, no obstructing renal stones. No urinary symptoms.   No fever.     PAST MEDICAL & SURGICAL HISTORY:  Hypothyroidism  Migraines  Anxiety  Kidney Calculi  Obesity  Fibromyalgia  Kidney Calculus- Lithotripsy x 3  S/P Hysterectomy   Delivery  S/P Total Thyroidectomy  History of Appendectomy  Asthma  S/P Laparoscopic Cholecystectomy  History of lumbar spinal fusion    Social Hx: No smoking, EtOH or drugs     FAMILY HISTORY:  FH: emphysema (Father)    FH: heart attack (Father)    Allergies  morphine (Nausea; Vomiting)  latex (Anaphylaxis)  penicillin (Hives)  codeine (Anaphylaxis; Hives)  fish (Anaphylaxis)    MEDICATIONS  (STANDING):  benzonatate 200 milliGRAM(s) Oral <User Schedule>  budesonide 160 MICROgram(s)/formoterol 4.5 MICROgram(s) Inhaler 2 Puff(s) Inhalation two times a day  cefTRIAXone   IVPB 1000 milliGRAM(s) IV Intermittent every 24 hours  clonazePAM  Tablet 1 milliGRAM(s) Oral at bedtime  enoxaparin Injectable 40 milliGRAM(s) SubCutaneous every 24 hours  levothyroxine 150 MICROGram(s) Oral daily  loratadine 10 milliGRAM(s) Oral daily  montelukast 10 milliGRAM(s) Oral at bedtime  sodium chloride 0.9%. 1000 milliLiter(s) (50 mL/Hr) IV Continuous <Continuous>  tamsulosin 0.4 milliGRAM(s) Oral daily     REVIEW OF SYSTEMS:  CONSTITUTIONAL:  No Fever or chills  HEENT:  No diplopia or blurred vision.  No sore throat or runny nose.  CARDIOVASCULAR:  No chest pain or SOB.  RESPIRATORY:  No cough, shortness of breath, PND or orthopnea.  GASTROINTESTINAL:  No nausea, vomiting or diarrhea.  GENITOURINARY:  No dysuria, frequency or urgency. No Blood in urine  MUSCULOSKELETAL:  no joint aches, no muscle pain  SKIN:  No change in skin, hair or nails.  NEUROLOGIC:  No paresthesias or weakness.  PSYCHIATRIC:  No disorder of thought or mood.  ENDOCRINE:  No heat or cold intolerance, polyuria or polydipsia.  HEMATOLOGICAL:  No easy bruising or bleeding.     Physical Exam:  Vital Signs Last 24 Hrs  T(C): 37.1 (28 Dec 2023 13:47), Max: 37.1 (28 Dec 2023 13:47)  T(F): 98.8 (28 Dec 2023 13:47), Max: 98.8 (28 Dec 2023 13:47)  HR: 75 (28 Dec 2023 13:47) (60 - 75)  BP: 102/54 (28 Dec 2023 13:47) (102/54 - 129/78)  BP(mean): --  RR: 17 (28 Dec 2023 13:47) (17 - 18)  SpO2: 93% (28 Dec 2023 13:47) (92% - 94%)  GEN: NAD  HEENT: normocephalic and atraumatic. EOMI. PERRL.    NECK: Supple.  No lymphadenopathy   LUNGS: Crackles in both bases   HEART: Regular rate and rhythm without murmur.  ABDOMEN: Soft, nondistended.  Positive bowel sounds.    Mild lower abdominal pain   : No CVA tenderness  EXTREMITIES: Without edema.  NEUROLOGIC: grossly intact.  PSYCHIATRIC: Appropriate affect .  SKIN: No rash     Labs:                        11.3   12.28 )-----------( 203      ( 28 Dec 2023 06:15 )             34.6     12-28    140  |  109<H>  |  17  ----------------------------<  95  3.5   |  27  |  0.52    Ca    8.9      28 Dec 2023 06:15    TPro  6.9  /  Alb  2.9<L>  /  TBili  0.2  /  DBili  x   /  AST  13<L>  /  ALT  30  /  AlkPhos  62      Culture - Urine (collected 23 @ 14:45)  Source: Clean Catch Clean Catch (Midstream)  Final Report (23 @ 07:53):    <10,000 CFU/mL Normal Urogenital Katarina    Culture - Blood (collected 23 @ 05:33)  Source: .Blood Blood-Peripheral  Preliminary Report (23 @ 09:01):    No growth at 48 Hours    Culture - Blood (collected 23 @ 05:30)  Source: .Blood Blood-Peripheral  Preliminary Report (23 @ 09:01):    No growth at 48 Hours    Culture - Urine (collected 22 @ 12:33)  Source: Clean Catch Clean Catch (Midstream)  Final Report (22 @ 11:19):    <10,000 CFU/mL Normal Urogenital Katarina    WBC Count: 12.28 K/uL (23 @ 06:15)  WBC Count: 7.08 K/uL (23 @ 06:40)  WBC Count: 10.98 K/uL (23 @ 05:33)  WBC Count: 14.36 K/uL (23 @ 21:40)    Creatinine: 0.52 mg/dL (23 @ 06:15)  Creatinine: 0.36 mg/dL (23 @ 06:40)  Creatinine: 0.49 mg/dL (23 @ 05:33)  Creatinine: 0.65 mg/dL (23 @ 21:40)     SARS-CoV-2 Result: NotDetec (23 @ 02:35)  SARS-CoV-2: NotDetec (23 @ 02:35)    All imaging and other data have been reviewed.  < from: CT Abdomen and Pelvis No Cont (23 @ 02:03) >  IMPRESSION:  Exam limited by noncontrast technique and by streak artifact from hip   hardware and spinal hardware. No acute intra-abdominal findings within   these limitations.  Small patchy opacities in the right lower lobe suspicious for pneumonia.   Recommend clinical correlation and follow-up chest CT imaging in 6-8   weeks to document resolution.      Assessment and Plan:   69y/o woman with PMH of anxiety, hypothyroidism, asthma, kidney stones s/p lithotripsy on 22 and diverticulitis in  presents with lower abdominal pain.   CT with no pathology but it was noncontrast and also artifact form hip and spine hardware made it difficult to say in details. Lower chest with pneumonia.  No urinary symptoms, doubt to have a UTI.   WBC 14k on admission   Abdominal pain can happen with pneumonia based on repeat CT with cont no obstructing stones, CT chest with pneumonia.     # Abdominal pain   # Pneumonia     - Blood and urine cultures NGTD   - Will monitor WBC 14-->7 -->12 with steroid   - Can switch ceftriaxone to Levaquin to complete total 7days of treatment   - Legionella U ag negative so stopped azithromycin yesterday  - Urology consult noted     Will follow PRN, please call with any question.    Richard Lizarraga MD  Division of Infectious Diseases   Please call ID service at [phone] with any question.    50 minutes spent on total encounter assessing patient, examination, chart review, counseling and coordinating care by the attending physician/nurse/care manager.   [facility]  INFECTIOUS DISEASES   [address] [place] [address]  Tel: [phone]     Fax: [phone]  ========================================================  MD Sharda Lovelace Kaushal, MD Cho, Michelle, MD Sunjit, Jaspal, MD  ========================================================    [id number]  MARI ROBERTO     Follow up: Pneumonia    + cough, abdominal pain improving, no obstructing renal stones. No urinary symptoms.   No fever.     PAST MEDICAL & SURGICAL HISTORY:  Hypothyroidism  Migraines  Anxiety  Kidney Calculi  Obesity  Fibromyalgia  Kidney Calculus- Lithotripsy x 3  S/P Hysterectomy   Delivery  S/P Total Thyroidectomy  History of Appendectomy  Asthma  S/P Laparoscopic Cholecystectomy  History of lumbar spinal fusion    Social Hx: No smoking, EtOH or drugs     FAMILY HISTORY:  FH: emphysema (Father)    FH: heart attack (Father)    Allergies  morphine (Nausea; Vomiting)  latex (Anaphylaxis)  penicillin (Hives)  codeine (Anaphylaxis; Hives)  fish (Anaphylaxis)    MEDICATIONS  (STANDING):  benzonatate 200 milliGRAM(s) Oral <User Schedule>  budesonide 160 MICROgram(s)/formoterol 4.5 MICROgram(s) Inhaler 2 Puff(s) Inhalation two times a day  cefTRIAXone   IVPB 1000 milliGRAM(s) IV Intermittent every 24 hours  clonazePAM  Tablet 1 milliGRAM(s) Oral at bedtime  enoxaparin Injectable 40 milliGRAM(s) SubCutaneous every 24 hours  levothyroxine 150 MICROGram(s) Oral daily  loratadine 10 milliGRAM(s) Oral daily  montelukast 10 milliGRAM(s) Oral at bedtime  sodium chloride 0.9%. 1000 milliLiter(s) (50 mL/Hr) IV Continuous <Continuous>  tamsulosin 0.4 milliGRAM(s) Oral daily     REVIEW OF SYSTEMS:  CONSTITUTIONAL:  No Fever or chills  HEENT:  No diplopia or blurred vision.  No sore throat or runny nose.  CARDIOVASCULAR:  No chest pain or SOB.  RESPIRATORY:  No cough, shortness of breath, PND or orthopnea.  GASTROINTESTINAL:  No nausea, vomiting or diarrhea.  GENITOURINARY:  No dysuria, frequency or urgency. No Blood in urine  MUSCULOSKELETAL:  no joint aches, no muscle pain  SKIN:  No change in skin, hair or nails.  NEUROLOGIC:  No paresthesias or weakness.  PSYCHIATRIC:  No disorder of thought or mood.  ENDOCRINE:  No heat or cold intolerance, polyuria or polydipsia.  HEMATOLOGICAL:  No easy bruising or bleeding.     Physical Exam:  Vital Signs Last 24 Hrs  T(C): 37.1 (28 Dec 2023 13:47), Max: 37.1 (28 Dec 2023 13:47)  T(F): 98.8 (28 Dec 2023 13:47), Max: 98.8 (28 Dec 2023 13:47)  HR: 75 (28 Dec 2023 13:47) (60 - 75)  BP: 102/54 (28 Dec 2023 13:47) (102/54 - 129/78)  BP(mean): --  RR: 17 (28 Dec 2023 13:47) (17 - 18)  SpO2: 93% (28 Dec 2023 13:47) (92% - 94%)  GEN: NAD  HEENT: normocephalic and atraumatic. EOMI. PERRL.    NECK: Supple.  No lymphadenopathy   LUNGS: Crackles in both bases   HEART: Regular rate and rhythm without murmur.  ABDOMEN: Soft, nondistended.  Positive bowel sounds.    Mild lower abdominal pain   : No CVA tenderness  EXTREMITIES: Without edema.  NEUROLOGIC: grossly intact.  PSYCHIATRIC: Appropriate affect .  SKIN: No rash     Labs:                        11.3   12.28 )-----------( 203      ( 28 Dec 2023 06:15 )             34.6     12-28    140  |  109<H>  |  17  ----------------------------<  95  3.5   |  27  |  0.52    Ca    8.9      28 Dec 2023 06:15    TPro  6.9  /  Alb  2.9<L>  /  TBili  0.2  /  DBili  x   /  AST  13<L>  /  ALT  30  /  AlkPhos  62      Culture - Urine (collected 23 @ 14:45)  Source: Clean Catch Clean Catch (Midstream)  Final Report (23 @ 07:53):    <10,000 CFU/mL Normal Urogenital Katarina    Culture - Blood (collected 23 @ 05:33)  Source: .Blood Blood-Peripheral  Preliminary Report (23 @ 09:01):    No growth at 48 Hours    Culture - Blood (collected 23 @ 05:30)  Source: .Blood Blood-Peripheral  Preliminary Report (23 @ 09:01):    No growth at 48 Hours    Culture - Urine (collected 22 @ 12:33)  Source: Clean Catch Clean Catch (Midstream)  Final Report (22 @ 11:19):    <10,000 CFU/mL Normal Urogenital Katarina    WBC Count: 12.28 K/uL (23 @ 06:15)  WBC Count: 7.08 K/uL (23 @ 06:40)  WBC Count: 10.98 K/uL (23 @ 05:33)  WBC Count: 14.36 K/uL (23 @ 21:40)    Creatinine: 0.52 mg/dL (23 @ 06:15)  Creatinine: 0.36 mg/dL (23 @ 06:40)  Creatinine: 0.49 mg/dL (23 @ 05:33)  Creatinine: 0.65 mg/dL (23 @ 21:40)     SARS-CoV-2 Result: NotDetec (23 @ 02:35)  SARS-CoV-2: NotDetec (23 @ 02:35)    All imaging and other data have been reviewed.  < from: CT Abdomen and Pelvis No Cont (23 @ 02:03) >  IMPRESSION:  Exam limited by noncontrast technique and by streak artifact from hip   hardware and spinal hardware. No acute intra-abdominal findings within   these limitations.  Small patchy opacities in the right lower lobe suspicious for pneumonia.   Recommend clinical correlation and follow-up chest CT imaging in 6-8   weeks to document resolution.      Assessment and Plan:   69y/o woman with PMH of anxiety, hypothyroidism, asthma, kidney stones s/p lithotripsy on 22 and diverticulitis in  presents with lower abdominal pain.   CT with no pathology but it was noncontrast and also artifact form hip and spine hardware made it difficult to say in details. Lower chest with pneumonia.  No urinary symptoms, doubt to have a UTI.   WBC 14k on admission   Abdominal pain can happen with pneumonia based on repeat CT with cont no obstructing stones, CT chest with pneumonia.     # Abdominal pain   # Pneumonia     - Blood and urine cultures NGTD   - Will monitor WBC 14-->7 -->12 with steroid   - Can switch ceftriaxone to Levaquin to complete total 7days of treatment   - Legionella U ag negative so stopped azithromycin yesterday  - Urology consult noted     Will follow PRN, please call with any question.    Richard Lizarraga MD  Division of Infectious Diseases   Please call ID service at [phone] with any question.    50 minutes spent on total encounter assessing patient, examination, chart review, counseling and coordinating care by the attending physician/nurse/care manager.

## 2023-12-28 NOTE — DISCHARGE NOTE PROVIDER - NSDCCPCAREPLAN_GEN_ALL_CORE_FT
PRINCIPAL DISCHARGE DIAGNOSIS  Diagnosis: Pneumonia  Assessment and Plan of Treatment: You were found to have pneumonia on cat scan.   You will need to continue levaquin 500mg daily for 5 more days starting 12/29.   Please follow up with your primary care physician.      SECONDARY DISCHARGE DIAGNOSES  Diagnosis: Bilateral kidney stones  Assessment and Plan of Treatment: You had several non-obstructing renal stones on cat scan.   You were seen by urology. no plans for acute intervention.   Please follow up with urology in 1-2 weeks.    Diagnosis: Hypokalemia  Assessment and Plan of Treatment: You were supplemented during hospitalization.

## 2023-12-28 NOTE — DISCHARGE NOTE PROVIDER - HOSPITAL COURSE
67y/o F with a PMHx of anxiety, hypothyroidism, asthma, kidney stones (s/p lithotripsy on 5/4/22) presents complaining of lower abdominal pain. Admit for possible UTI, Hypokalemia, Non obstructing kidney stones and PNA.    Patient was premedicated and had a contrast CT showing non obstructing kidney stones and pneumonia. She was started on rocephin and transitioned to levaquin 500mg daily for 5 more days. She was seen by urology and no intervention was indicated at this time for the kidney stones.     Consults:   ID: Dr Lizarraga  Urology: Dr Daigle     67y/o F with a PMHx of anxiety, hypothyroidism, asthma, kidney stones (s/p lithotripsy on 5/4/22) presents complaining of lower abdominal pain. Admit for possible UTI, Hypokalemia, Non obstructing kidney stones and PNA.    Patient was premedicated and had a contrast CT showing non obstructing kidney stones and pneumonia. She was started on rocephin and transitioned to levaquin 500mg daily for 5 more days. She was seen by urology and no intervention was indicated at this time for the kidney stones.     Consults:   ID: Dr Lizarraga  Urology: Dr Daigle      She was seen on day of discharge. reports feeling better. tolerating diet. will follow up outpatient with PMD and urology 69y/o F with a PMHx of anxiety, hypothyroidism, asthma, kidney stones (s/p lithotripsy on 5/4/22) presents complaining of lower abdominal pain. Admit for possible UTI, Hypokalemia, Non obstructing kidney stones and PNA.    Patient was premedicated and had a contrast CT showing non obstructing kidney stones and pneumonia. She was started on rocephin and transitioned to levaquin 500mg daily for 5 more days. She was seen by urology and no intervention was indicated at this time for the kidney stones.     Consults:   ID: Dr Lizarraga  Urology: Dr Daigle      She was seen on day of discharge. reports feeling better. tolerating diet. will follow up outpatient with PMD and urology

## 2023-12-28 NOTE — DISCHARGE NOTE PROVIDER - PROVIDER TOKENS
PROVIDER:[TOKEN:[[id number]],FOLLOWUP:[2 weeks]],PROVIDER:[TOKEN:[[id number]],FOLLOWUP:[2 weeks]] PROVIDER:[TOKEN:[[id number]],FOLLOWUP:[2 weeks]],PROVIDER:[TOKEN:[[id number]],FOLLOWUP:[2 weeks]]

## 2023-12-28 NOTE — DISCHARGE NOTE PROVIDER - CARE PROVIDERS DIRECT ADDRESSES
,DirectAddress_Unknown,[email or web address] ,DirectAddress_Unknown,[email or web address]

## 2023-12-28 NOTE — PROVIDER CONTACT NOTE (MEDICATION) - ASSESSMENT
Patient is stable and safe.  Confirm some tenderness when touch to site but due to where access is placed.  No redness noted to site.

## 2023-12-28 NOTE — DISCHARGE NOTE NURSING/CASE MANAGEMENT/SOCIAL WORK - PATIENT PORTAL LINK FT
You can access the FollowMyHealth Patient Portal offered by [facility] by registering at the following website: [email or web address]. By joining [employer]’s FollowMyHealth portal, you will also be able to view your health information using other applications (apps) compatible with our system. You can access the FollowMyHealth Patient Portal offered by [facility] by registering at the following website: [email or web address]. By joining [employer]’s FollowMyHealth portal, you will also be able to view your health information using other applications (apps) compatible with our system.

## 2023-12-28 NOTE — DISCHARGE NOTE PROVIDER - CARE PROVIDER_API CALL
Prieto Monteiro  Physician Assistant Services  [address] [place] [address]  Phone: [phone]  Fax: [phone]  Follow Up Time: 2 weeks    Malachi Moreira  Urology  [address], [address]  [place] [address]  Phone: [phone]  Fax: [phone]  Follow Up Time: 2 weeks   Prieto Monteiro  Physician Assistant Services  [address] [place] [address]  Phone: [phone]  Fax: [phone]  Follow Up Time: 2 weeks    Malachi Moreira  Urology  [address], [address]  [place] [address]  Phone: [phone]  Fax: [phone]  Follow Up Time: 2 weeks

## 2023-12-28 NOTE — DISCHARGE NOTE PROVIDER - NSDCMRMEDTOKEN_GEN_ALL_CORE_FT
albuterol 90 mcg/inh inhalation aerosol: 1 puff(s) inhaled every 6 hours As needed Shortness of Breath and/or Wheezing  benzonatate 100 mg oral capsule: 2 cap(s) orally once a day (at bedtime)  budesonide-formoterol 160 mcg-4.5 mcg/inh inhalation aerosol: 2 puff(s) inhaled 2 times a day  clonazePAM 1 mg oral tablet: 1 tab(s) orally 3 times a day, As Needed  levoFLOXacin 500 mg oral tablet: 1 tab(s) orally once a day  levothyroxine 150 mcg (0.15 mg) oral tablet: 1 tab(s) orally once a day  loratadine 10 mg oral tablet: 1 tab(s) orally once a day  montelukast 10 mg oral tablet: 1 tab(s) orally once a day (at bedtime)  tamsulosin 0.4 mg oral capsule: 1 cap(s) orally once a day

## 2023-12-29 ENCOUNTER — NON-APPOINTMENT (OUTPATIENT)
Age: 68
End: 2023-12-29

## 2023-12-29 RX ORDER — LEVOFLOXACIN 5 MG/ML
1 INJECTION, SOLUTION INTRAVENOUS
Qty: 5 | Refills: 0
Start: 2023-12-29

## 2023-12-31 LAB
CULTURE RESULTS: SIGNIFICANT CHANGE UP
SPECIMEN SOURCE: SIGNIFICANT CHANGE UP

## 2024-01-08 ENCOUNTER — APPOINTMENT (OUTPATIENT)
Dept: FAMILY MEDICINE | Facility: CLINIC | Age: 69
End: 2024-01-08
Payer: MEDICARE

## 2024-01-08 VITALS
TEMPERATURE: 97.8 F | HEIGHT: 60 IN | BODY MASS INDEX: 27.09 KG/M2 | DIASTOLIC BLOOD PRESSURE: 80 MMHG | HEART RATE: 88 BPM | OXYGEN SATURATION: 98 % | SYSTOLIC BLOOD PRESSURE: 124 MMHG | WEIGHT: 138 LBS

## 2024-01-08 DIAGNOSIS — E87.6 HYPOKALEMIA: ICD-10-CM

## 2024-01-08 DIAGNOSIS — Z20.822 CONTACT WITH AND (SUSPECTED) EXPOSURE TO COVID-19: ICD-10-CM

## 2024-01-08 DIAGNOSIS — R31.9 HEMATURIA, UNSPECIFIED: ICD-10-CM

## 2024-01-08 DIAGNOSIS — J18.9 PNEUMONIA, UNSPECIFIED ORGANISM: ICD-10-CM

## 2024-01-08 DIAGNOSIS — E16.2 HYPOGLYCEMIA, UNSPECIFIED: ICD-10-CM

## 2024-01-08 PROCEDURE — 99495 TRANSJ CARE MGMT MOD F2F 14D: CPT | Mod: 25

## 2024-01-08 PROCEDURE — 36415 COLL VENOUS BLD VENIPUNCTURE: CPT

## 2024-01-08 RX ORDER — CIPROFLOXACIN HYDROCHLORIDE 500 MG/1
500 TABLET, FILM COATED ORAL
Qty: 20 | Refills: 0 | Status: COMPLETED | COMMUNITY
Start: 2023-12-15 | End: 2024-01-08

## 2024-01-08 NOTE — HISTORY OF PRESENT ILLNESS
[Post-hospitalization from ___ Hospital] : Post-hospitalization from [unfilled] Hospital [Admitted on: ___] : The patient was admitted on [unfilled] [Discharged on ___] : discharged on [unfilled] [FreeTextEntry2] : 67 yo female s/p [place] hospital admission 12/25/23-12/28/23  Dx: Pneumonia, UTI, and Hypokalemia.   D/c'ed c Benzonatate 200mg q8h, prn,  Symbicort 160/4.5 2 puffs bid, and Levaquin 500mg qd.      Today without fever and chills  +ve cont'd chronic sweats   CT Chest/Abd/Pelvis 12/27/23  remarkable for  RLL  airspace consolidation  pt c hx of nausea that is slowly improving  RESOLVED vomiting  RESOLVED diarrhea  +ve BM today c aid of Stool Softener and  Miralax yesterday   normal in color   NO bloody/black stools

## 2024-01-08 NOTE — PHYSICAL EXAM
[No Acute Distress] : no acute distress [EOMI] : extraocular movements intact [Normal Outer Ear/Nose] : the outer ears and nose were normal in appearance [No JVD] : no jugular venous distention [No Respiratory Distress] : no respiratory distress  [No Accessory Muscle Use] : no accessory muscle use [Clear to Auscultation] : lungs were clear to auscultation bilaterally [Normal Rate] : normal rate  [Regular Rhythm] : with a regular rhythm [Normal S1, S2] : normal S1 and S2 [No Edema] : there was no peripheral edema [Soft] : abdomen soft [Non Tender] : non-tender [Non-distended] : non-distended [No CVA Tenderness] : no CVA  tenderness [No Rash] : no rash [Coordination Grossly Intact] : coordination grossly intact [Speech Grossly Normal] : speech grossly normal [Normal Affect] : the affect was normal [Normal Mood] : the mood was normal [Normal Insight/Judgement] : insight and judgment were intact

## 2024-01-19 ENCOUNTER — APPOINTMENT (OUTPATIENT)
Dept: ORTHOPEDIC SURGERY | Facility: CLINIC | Age: 69
End: 2024-01-19
Payer: MEDICARE

## 2024-01-19 VITALS — BODY MASS INDEX: 27.09 KG/M2 | WEIGHT: 138 LBS | HEIGHT: 60 IN

## 2024-01-19 DIAGNOSIS — M17.11 UNILATERAL PRIMARY OSTEOARTHRITIS, RIGHT KNEE: ICD-10-CM

## 2024-01-19 PROCEDURE — 73503 X-RAY EXAM HIP UNI 4/> VIEWS: CPT | Mod: RT

## 2024-01-19 PROCEDURE — 72100 X-RAY EXAM L-S SPINE 2/3 VWS: CPT

## 2024-01-19 PROCEDURE — 99214 OFFICE O/P EST MOD 30 MIN: CPT

## 2024-01-19 NOTE — ASSESSMENT
[FreeTextEntry1] : Previous doc: 4/18/23: Mod/sev OA Right Hip on xrays today - outside MRI reviewed - Hx of Lumbar fusion and osteopenia. Due to hx of spine surgery will use bimentum cup. Will benefit from Right LILIA - does not do well with oxycodone, morphine, hydrocodone; states she does do well with diladid failed PT, NSAIDs pain affecting ADLs - does not want inj at this point = 6/27/23: Nearly 2wks postop R LILIA. She is doing well today and happy with progress. Begin outpatient PT. All questions and concerns were addressed. Follow up in 6 weeks and repeat XR. Recommended she take TUMS with the aspirin and take with a full stomach 8/8/23: PO #2 7 weeks s/p R LILIA. Recovering slowly. Pt in still significant amt of pain on exam and with ambulation. She does have improvement overall and is feeling better than prior to surgery. Pt c/o constipation- likely residual symptoms from meds administered during surgery, but is improving. Denied anti-inflam today. Will cont to monitor. Continue with PT. Follow up in 6 weeks. 9/19/23: PO #3. 3 months s/p R LILIA. Still having some muscular pain but continuing to improve. Continue with PT. Advised patient to gradually return to daily life activities- no restrictions. Follow up 1 year or sooner if having worsening pain. 11/17/23: New onset right hip pain 2 weeks ago, no hip findings today.  Has valgus OA with adv PF OA in right knee could be contributing - inj todya and reeval in 4 weeks if pain persists.  No issues with hip seen at this time.  1/19/24: Combined hip and back pain - XRs hip look well fixed, Lspine fusion intact but has extensive scoliosis which likely is causing pain down leg.  Recc MRI right hip eval fluid collection, MRI Lspine eval disc herniation and blood work r/o infection.

## 2024-01-19 NOTE — HISTORY OF PRESENT ILLNESS
[Burning] : burning [Dull/Aching] : dull/aching [Radiating] : radiating [Sharp] : sharp [Throbbing] : throbbing [de-identified] : 1/19/24: Cont pain throughout right leg.  Has some back pain.  Knee injection last time helped the knee but still has leg pain.  Some numbness/tingling.  Previous doc:  4/18/23: Patient is a 68 yo female c/o right hip for 1 year with no specific injury. Taking advil as needed for pain. Occasional n/t down leg. Pain is worse with walking, stairs and getting up from seated position. No previous injuries or surgeries to right hip. Has seen another ortho who ordered MRI (Dr. Slade). 6/27/23: 13 days s/p R LILIA. She notes pain is better than prior to surgery. Amb with walker. Not taking oxy. States she has some stomach issues with the aspirin, but she is taking it alongside Pepcid  8/8/23: PO #2 7 weeks R LILIA. Pt still having pain with walking and physical activity. Pain in groin. Currently in PT- feels she is making progress but slow. Pt stopped using walker and now using cane to ambulate. Taking advil for pain.  9/19/23: PO #3. 3 months s/p R LILIA. Reports having pain but different kind of pain than prior to surgery- more incisional pain. States she has good and bad days but overall feels she is progressing well. Currently doing PT which she feels is beneficial. Had sciatic episode 2 weeks ago, and sharp pains around the incision about a week ago. Reports clicking in the RT knee. Taking advil with good relief.  Ambulates with cane.  11/17/23: Started having some pain over the past 2 weeks, has been better over the past few days.  Pain radiates into knee. [FreeTextEntry5] : pain has gotten worse. pain radiates down the leg into the inner thigh

## 2024-01-19 NOTE — IMAGING
[Scoliosis] : Scoliosis [Fusion intact] : Fusion intact [Right] : right hip with pelvis [AP] : anteroposterior [Lateral] : lateral [Components well fixed, in good position] : Components well fixed, in good position [de-identified] : Right hip: Inc healed.  No swelling.  No tenderness.  Mild groin pain with IR.  NVI.  Right knee: Valgus alignment.  No effusion.  Lateral joint line tenderness.  Lspine: Pos sitting SLR.

## 2024-01-19 NOTE — END OF VISIT
[FreeTextEntry3] : I Dr. Pruitt, personally performed the evaluation and management services for this established patient who present today with a new problem/exacerbation of an existing condition. The E/M includes conducting the examination, assessing all new/exacerbated conditions, and establishing a new plan of care. Today, my KELTON, Ike Camacho, was here to observe in my evaluation and management services of this new problem/exacerbated condition to be followed going forward.

## 2024-01-19 NOTE — DISCUSSION/SUMMARY
[de-identified] : The patient was advised of the diagnosis.  The natural history of the pathology was explained in full to the patient in layman's terms. All questions were answered.  The risks and benefits of surgical and non-surgical treatment alternatives were explained in full to the patient.

## 2024-01-25 ENCOUNTER — RESULT REVIEW (OUTPATIENT)
Age: 69
End: 2024-01-25

## 2024-02-02 ENCOUNTER — APPOINTMENT (OUTPATIENT)
Dept: ORTHOPEDIC SURGERY | Facility: CLINIC | Age: 69
End: 2024-02-02
Payer: MEDICARE

## 2024-02-02 VITALS — WEIGHT: 138 LBS | HEIGHT: 60 IN | BODY MASS INDEX: 27.09 KG/M2

## 2024-02-02 PROCEDURE — 99214 OFFICE O/P EST MOD 30 MIN: CPT

## 2024-02-02 NOTE — ASSESSMENT
[FreeTextEntry1] : Previous doc: 4/18/23: Mod/sev OA Right Hip on xrays today - outside MRI reviewed - Hx of Lumbar fusion and osteopenia. Due to hx of spine surgery will use bimentum cup. Will benefit from Right LILIA - does not do well with oxycodone, morphine, hydrocodone; states she does do well with diladid failed PT, NSAIDs pain affecting ADLs - does not want inj at this point = 6/27/23: Nearly 2wks postop R LILIA. She is doing well today and happy with progress. Begin outpatient PT. All questions and concerns were addressed. Follow up in 6 weeks and repeat XR. Recommended she take TUMS with the aspirin and take with a full stomach 8/8/23: PO #2 7 weeks s/p R LILIA. Recovering slowly. Pt in still significant amt of pain on exam and with ambulation. She does have improvement overall and is feeling better than prior to surgery. Pt c/o constipation- likely residual symptoms from meds administered during surgery, but is improving. Denied anti-inflam today. Will cont to monitor. Continue with PT. Follow up in 6 weeks. 9/19/23: PO #3. 3 months s/p R LILIA. Still having some muscular pain but continuing to improve. Continue with PT. Advised patient to gradually return to daily life activities- no restrictions. Follow up 1 year or sooner if having worsening pain. 11/17/23: New onset right hip pain 2 weeks ago, no hip findings today.  Has valgus OA with adv PF OA in right knee could be contributing - inj todya and reeval in 4 weeks if pain persists.  No issues with hip seen at this time. 1/19/24: Combined hip and back pain - XRs hip look well fixed, Lspine fusion intact but has extensive scoliosis which likely is causing pain down leg.  Recc MRI right hip eval fluid collection, MRI Lspine eval disc herniation and blood work r/o infection.  2/2/24: Still has global sensitivity to light touch. Mildly elevated ESR and CRP. Small effusion on R hip MRI. Admits to having pneumonia at the end of December. Unsure if the elevated bloodwork could be due to small infection vs. inflammatory process vs. residual from her pneumonia. Will repeat ESR and CRP- instructed to have this done in 2 weeks. Arthrotec rx. f/up in 1 month.

## 2024-02-02 NOTE — IMAGING
[de-identified] : Right hip: Inc healed.  No swelling.  No tenderness.  Mild groin pain with IR.  NVI.  Right knee: Valgus alignment.  No effusion.  Lateral joint line tenderness.  Lspine: Pos sitting SLR.

## 2024-02-02 NOTE — HISTORY OF PRESENT ILLNESS
[Gradual] : gradual [Dull/Aching] : dull/aching [Throbbing] : throbbing [Constant] : constant [Rest] : rest [8] : 8 [Meds] : meds [Sitting] : sitting [Standing] : standing [Walking] : walking [Stairs] : stairs [de-identified] : 2/2/24: Here to review MRI R hip and L-spine. She also had bloodwork done  Previous doc:  4/18/23: Patient is a 68 yo female c/o right hip for 1 year with no specific injury. Taking advil as needed for pain. Occasional n/t down leg. Pain is worse with walking, stairs and getting up from seated position. No previous injuries or surgeries to right hip. Has seen another ortho who ordered MRI (Dr. Slade). 6/27/23: 13 days s/p R LILIA. She notes pain is better than prior to surgery. Amb with walker. Not taking oxy. States she has some stomach issues with the aspirin, but she is taking it alongside Pepcid  8/8/23: PO #2 7 weeks R LILIA. Pt still having pain with walking and physical activity. Pain in groin. Currently in PT- feels she is making progress but slow. Pt stopped using walker and now using cane to ambulate. Taking advil for pain.  9/19/23: PO #3. 3 months s/p R LILIA. Reports having pain but different kind of pain than prior to surgery- more incisional pain. States she has good and bad days but overall feels she is progressing well. Currently doing PT which she feels is beneficial. Had sciatic episode 2 weeks ago, and sharp pains around the incision about a week ago. Reports clicking in the RT knee. Taking advil with good relief.  Ambulates with cane.  11/17/23: Started having some pain over the past 2 weeks, has been better over the past few days.  Pain radiates into knee. 1/19/24: Cont pain throughout right leg.  Has some back pain.  Knee injection last time helped the knee but still has leg pain.  Some numbness/tingling. [] : no [FreeTextEntry1] : right hip  [FreeTextEntry9] : advil  [de-identified] : MRI

## 2024-02-02 NOTE — DISCUSSION/SUMMARY
[de-identified] : The patient was advised of the diagnosis.  The natural history of the pathology was explained in full to the patient in layman's terms. All questions were answered.  The risks and benefits of surgical and non-surgical treatment alternatives were explained in full to the patient.  Progress note completed by Charisse Concepcion PA-C.

## 2024-02-06 ENCOUNTER — APPOINTMENT (OUTPATIENT)
Dept: OBGYN | Facility: CLINIC | Age: 69
End: 2024-02-06
Payer: MEDICARE

## 2024-02-06 ENCOUNTER — RX RENEWAL (OUTPATIENT)
Age: 69
End: 2024-02-06

## 2024-02-06 VITALS
HEIGHT: 60 IN | BODY MASS INDEX: 29.25 KG/M2 | WEIGHT: 149 LBS | SYSTOLIC BLOOD PRESSURE: 118 MMHG | DIASTOLIC BLOOD PRESSURE: 74 MMHG

## 2024-02-06 DIAGNOSIS — Z87.898 PERSONAL HISTORY OF OTHER SPECIFIED CONDITIONS: ICD-10-CM

## 2024-02-06 DIAGNOSIS — Z87.42 PERSONAL HISTORY OF OTHER DISEASES OF THE FEMALE GENITAL TRACT: ICD-10-CM

## 2024-02-06 DIAGNOSIS — Z90.710 ACQUIRED ABSENCE OF BOTH CERVIX AND UTERUS: ICD-10-CM

## 2024-02-06 DIAGNOSIS — R82.81 PYURIA: ICD-10-CM

## 2024-02-06 DIAGNOSIS — Z01.419 ENCOUNTER FOR GYNECOLOGICAL EXAMINATION (GENERAL) (ROUTINE) W/OUT ABNORMAL FINDINGS: ICD-10-CM

## 2024-02-06 DIAGNOSIS — R87.810 CERVICAL HIGH RISK HUMAN PAPILLOMAVIRUS (HPV) DNA TEST POSITIVE: ICD-10-CM

## 2024-02-06 DIAGNOSIS — Z01.818 ENCOUNTER FOR OTHER PREPROCEDURAL EXAMINATION: ICD-10-CM

## 2024-02-06 DIAGNOSIS — J06.9 ACUTE UPPER RESPIRATORY INFECTION, UNSPECIFIED: ICD-10-CM

## 2024-02-06 DIAGNOSIS — N95.2 POSTMENOPAUSAL ATROPHIC VAGINITIS: ICD-10-CM

## 2024-02-06 DIAGNOSIS — Z12.31 ENCOUNTER FOR SCREENING MAMMOGRAM FOR MALIGNANT NEOPLASM OF BREAST: ICD-10-CM

## 2024-02-06 DIAGNOSIS — N89.8 OTHER SPECIFIED NONINFLAMMATORY DISORDERS OF VAGINA: ICD-10-CM

## 2024-02-06 PROCEDURE — G0101: CPT

## 2024-02-06 RX ORDER — DICLOFENAC SODIUM AND MISOPROSTOL 75; 200 MG/1; UG/1
75-0.2 TABLET, DELAYED RELEASE ORAL
Qty: 60 | Refills: 1 | Status: DISCONTINUED | COMMUNITY
Start: 2024-02-02 | End: 2024-02-06

## 2024-02-06 RX ORDER — METRONIDAZOLE 500 MG/1
500 TABLET ORAL 3 TIMES DAILY
Qty: 30 | Refills: 0 | Status: DISCONTINUED | COMMUNITY
Start: 2023-12-15 | End: 2024-02-06

## 2024-02-06 NOTE — PHYSICAL EXAM
[Chaperone Present] : A chaperone was present in the examining room during all aspects of the physical examination [Appropriately responsive] : appropriately responsive [Alert] : alert [No Acute Distress] : no acute distress [Soft] : soft [Non-tender] : non-tender [Non-distended] : non-distended [Oriented x3] : oriented x3 [Examination Of The Breasts] : a normal appearance [No Discharge] : no discharge [No Masses] : no breast masses were palpable [Normal] : normal external genitalia [Labia Majora] : normal [Labia Minora] : normal [No Bleeding] : There was no active vaginal bleeding [Absent] : absent [Atrophy] : atrophy [Dry Mucosa] : dry mucosa [Uterine Adnexae] : non-palpable

## 2024-02-06 NOTE — HISTORY OF PRESENT ILLNESS
[postmenopausal] : postmenopausal [Y] : Positive pregnancy history [Menarche Age: ____] : age at menarche was [unfilled] [No] : Patient does not have concerns regarding sex [Currently Active] : currently active [Mammogramdate] : 10/17/22 [TextBox_19] : BR 1  [BreastSonogramDate] : 10/17/22 [TextBox_25] : BR 1  [PapSmeardate] : 10/06/22 [TextBox_31] : NEG  [ColonoscopyDate] : 07/06/22 [HPVDate] : 10/06/22 [TextBox_78] : POS  [LMPDate] : 1992 [PGxTotal] : 6 [[facility] Hospital[facility]] : 5 [BannerxLiving] : 5 [PGHxABSpont] : 1 [FreeTextEntry1] : 1992

## 2024-02-06 NOTE — END OF VISIT
[FreeTextEntry3] : I, Dusty Haley, solely acted as scribe for Dr. Aleah Wang on 02/06/2024. All medical entries made by the Scribe were at my, Dr. Wang's, direction and personally dictated by me on 02/06/2024. I have reviewed the chart and agree that the record accurately reflects my personal performance of the history, physical exam, assessment and plan. I have also personally directed, reviewed, and agreed with the chart.

## 2024-02-06 NOTE — DISCUSSION/SUMMARY
[FreeTextEntry1] : Pap of vaginal cuff without HPV done today secondary to H/O abnormal vaginal pap.  Prescription for mammogram screening given.  Self-breast exam reviewed.  She will follow up annually and as needed.

## 2024-02-07 RX ORDER — NAPROXEN 500 MG/1
500 TABLET ORAL
Qty: 60 | Refills: 0 | Status: ACTIVE | COMMUNITY
Start: 2024-01-09 | End: 1900-01-01

## 2024-02-08 LAB — HPV HIGH+LOW RISK DNA PNL CVX: NOT DETECTED

## 2024-02-12 LAB — CYTOLOGY CVX/VAG DOC THIN PREP: NORMAL

## 2024-03-01 ENCOUNTER — APPOINTMENT (OUTPATIENT)
Dept: ORTHOPEDIC SURGERY | Facility: CLINIC | Age: 69
End: 2024-03-01
Payer: MEDICARE

## 2024-03-01 VITALS — HEIGHT: 60 IN | WEIGHT: 149 LBS | BODY MASS INDEX: 29.25 KG/M2

## 2024-03-01 PROCEDURE — 99214 OFFICE O/P EST MOD 30 MIN: CPT

## 2024-03-01 NOTE — HISTORY OF PRESENT ILLNESS
[8] : 8 [de-identified] : 3/1/24: Here to review repeat blood work ESR and CRP. 8 months s/p R LILIA. Stopped arthrotec due to gi issues. Continued pain in the groin and laterally. States her RT ankle is swollen.   Previous doc:  4/18/23: Patient is a 68 yo female c/o right hip for 1 year with no specific injury. Taking advil as needed for pain. Occasional n/t down leg. Pain is worse with walking, stairs and getting up from seated position. No previous injuries or surgeries to right hip. Has seen another ortho who ordered MRI (Dr. Slade). 6/27/23: 13 days s/p R LILIA. She notes pain is better than prior to surgery. Amb with walker. Not taking oxy. States she has some stomach issues with the aspirin, but she is taking it alongside Pepcid  8/8/23: PO #2 7 weeks R LILIA. Pt still having pain with walking and physical activity. Pain in groin. Currently in PT- feels she is making progress but slow. Pt stopped using walker and now using cane to ambulate. Taking advil for pain.  9/19/23: PO #3. 3 months s/p R LILIA. Reports having pain but different kind of pain than prior to surgery- more incisional pain. States she has good and bad days but overall feels she is progressing well. Currently doing PT which she feels is beneficial. Had sciatic episode 2 weeks ago, and sharp pains around the incision about a week ago. Reports clicking in the RT knee. Taking advil with good relief.  Ambulates with cane.  11/17/23: Started having some pain over the past 2 weeks, has been better over the past few days.  Pain radiates into knee. 1/19/24: Cont pain throughout right leg.  Has some back pain.  Knee injection last time helped the knee but still has leg pain.  Some numbness/tingling. 2/2/24: Here to review MRI R hip and L-spine. She also had bloodwork done [FreeTextEntry5] : pain increasing.

## 2024-03-01 NOTE — IMAGING
[de-identified] : Right hip: Inc healed.  No swelling.  No tenderness.  Mild groin pain with IR.  NVI.  Right knee: Valgus alignment.  No effusion.  Lateral joint line tenderness.  Lspine: Pos sitting SLR.

## 2024-03-01 NOTE — ASSESSMENT
[FreeTextEntry1] : Previous doc: 4/18/23: Mod/sev OA Right Hip on xrays today - outside MRI reviewed - Hx of Lumbar fusion and osteopenia. Due to hx of spine surgery will use bimentum cup. Will benefit from Right LILIA - does not do well with oxycodone, morphine, hydrocodone; states she does do well with diladid failed PT, NSAIDs pain affecting ADLs - does not want inj at this point = 6/27/23: Nearly 2wks postop R LILIA. She is doing well today and happy with progress. Begin outpatient PT. All questions and concerns were addressed. Follow up in 6 weeks and repeat XR. Recommended she take TUMS with the aspirin and take with a full stomach 8/8/23: PO #2 7 weeks s/p R LILIA. Recovering slowly. Pt in still significant amt of pain on exam and with ambulation. She does have improvement overall and is feeling better than prior to surgery. Pt c/o constipation- likely residual symptoms from meds administered during surgery, but is improving. Denied anti-inflam today. Will cont to monitor. Continue with PT. Follow up in 6 weeks. 9/19/23: PO #3. 3 months s/p R LILIA. Still having some muscular pain but continuing to improve. Continue with PT. Advised patient to gradually return to daily life activities- no restrictions. Follow up 1 year or sooner if having worsening pain. 11/17/23: New onset right hip pain 2 weeks ago, no hip findings today.  Has valgus OA with adv PF OA in right knee could be contributing - inj todya and reeval in 4 weeks if pain persists.  No issues with hip seen at this time. 1/19/24: Combined hip and back pain - XRs hip look well fixed, Lspine fusion intact but has extensive scoliosis which likely is causing pain down leg.  Recc MRI right hip eval fluid collection, MRI Lspine eval disc herniation and blood work r/o infection. 2/2/24: Still has global sensitivity to light touch. Mildly elevated ESR and CRP. Small effusion on R hip MRI. Admits to having pneumonia at the end of December. Unsure if the elevated bloodwork could be due to small infection vs. inflammatory process vs. residual from her pneumonia. Will repeat ESR and CRP- instructed to have this done in 2 weeks. Arthrotec rx. f/up in 1 month.   3/1/24: 8 months s/p R LILIA still with global pain and effusion. Mechanically XR look good. Still with elevated labs- CRP 8.6, ESR 31. Due to elevated labs and continued pain we will aspirate the hip and send fluid to eval for infection.  I am concerned for infection however without definitive results and her underlying spine disease could be possibly contributing I cannot proceed with surgical intervention.  If her aspiration is positive I think that liner exchange versus two-stage would be our best option.  Dans this agrees with the plan agrees with waiting until aspiration results follow up after aspiration. Follow up 2 weeks

## 2024-03-01 NOTE — DISCUSSION/SUMMARY
[de-identified] : The patient was advised of the diagnosis.  The natural history of the pathology was explained in full to the patient in layman's terms. All questions were answered.  The risks and benefits of surgical and non-surgical treatment alternatives were explained in full to the patient.  Entered by Corrine Langford acting as a scribe.

## 2024-03-08 ENCOUNTER — RESULT REVIEW (OUTPATIENT)
Age: 69
End: 2024-03-08

## 2024-03-08 ENCOUNTER — OUTPATIENT (OUTPATIENT)
Dept: OUTPATIENT SERVICES | Facility: HOSPITAL | Age: 69
LOS: 1 days | End: 2024-03-08
Payer: MEDICARE

## 2024-03-08 ENCOUNTER — APPOINTMENT (OUTPATIENT)
Dept: RADIOLOGY | Facility: CLINIC | Age: 69
End: 2024-03-08
Payer: MEDICARE

## 2024-03-08 DIAGNOSIS — Z98.1 ARTHRODESIS STATUS: Chronic | ICD-10-CM

## 2024-03-08 DIAGNOSIS — Z96.641 PRESENCE OF RIGHT ARTIFICIAL HIP JOINT: ICD-10-CM

## 2024-03-08 PROCEDURE — 20610 DRAIN/INJ JOINT/BURSA W/O US: CPT

## 2024-03-08 PROCEDURE — 77002 NEEDLE LOCALIZATION BY XRAY: CPT

## 2024-03-08 PROCEDURE — 20610 DRAIN/INJ JOINT/BURSA W/O US: CPT | Mod: RT

## 2024-03-08 PROCEDURE — 77002 NEEDLE LOCALIZATION BY XRAY: CPT | Mod: 26

## 2024-03-15 ENCOUNTER — APPOINTMENT (OUTPATIENT)
Dept: ORTHOPEDIC SURGERY | Facility: CLINIC | Age: 69
End: 2024-03-15
Payer: MEDICARE

## 2024-03-15 PROCEDURE — 99214 OFFICE O/P EST MOD 30 MIN: CPT

## 2024-03-15 NOTE — DISCUSSION/SUMMARY
[de-identified] : The patient was advised of the diagnosis.  The natural history of the pathology was explained in full to the patient in layman's terms. All questions were answered.  The risks and benefits of surgical and non-surgical treatment alternatives were explained in full to the patient.  Entered by Corrine Langford acting as a scribe.

## 2024-03-15 NOTE — HISTORY OF PRESENT ILLNESS
[Frequent] : frequent [9] : 9 [de-identified] : 3/15/24: Here to review aspiration results RT hip- 8 months s/p R LILIA.   Previous doc:  4/18/23: Patient is a 68 yo female c/o right hip for 1 year with no specific injury. Taking advil as needed for pain. Occasional n/t down leg. Pain is worse with walking, stairs and getting up from seated position. No previous injuries or surgeries to right hip. Has seen another ortho who ordered MRI (Dr. Slade). 6/27/23: 13 days s/p R LILIA. She notes pain is better than prior to surgery. Amb with walker. Not taking oxy. States she has some stomach issues with the aspirin, but she is taking it alongside Pepcid  8/8/23: PO #2 7 weeks R LILIA. Pt still having pain with walking and physical activity. Pain in groin. Currently in PT- feels she is making progress but slow. Pt stopped using walker and now using cane to ambulate. Taking advil for pain.  9/19/23: PO #3. 3 months s/p R LILIA. Reports having pain but different kind of pain than prior to surgery- more incisional pain. States she has good and bad days but overall feels she is progressing well. Currently doing PT which she feels is beneficial. Had sciatic episode 2 weeks ago, and sharp pains around the incision about a week ago. Reports clicking in the RT knee. Taking advil with good relief.  Ambulates with cane.  11/17/23: Started having some pain over the past 2 weeks, has been better over the past few days.  Pain radiates into knee. 1/19/24: Cont pain throughout right leg.  Has some back pain.  Knee injection last time helped the knee but still has leg pain.  Some numbness/tingling. 2/2/24: Here to review MRI R hip and L-spine. She also had bloodwork done 3/1/24: Here to review repeat blood work ESR and CRP. 8 months s/p R LILIA. Stopped arthrotec due to gi issues. Continued pain in the groin and laterally. States her RT ankle is swollen.  [FreeTextEntry1] : right hip  [FreeTextEntry5] : MARI is here today for right hip lab results from aspiration. pt states pain is increasing.

## 2024-03-15 NOTE — ASSESSMENT
[FreeTextEntry1] : Previous doc: 4/18/23: Mod/sev OA Right Hip on xrays today - outside MRI reviewed - Hx of Lumbar fusion and osteopenia. Due to hx of spine surgery will use bimentum cup. Will benefit from Right LILIA - does not do well with oxycodone, morphine, hydrocodone; states she does do well with diladid failed PT, NSAIDs pain affecting ADLs - does not want inj at this point = 6/27/23: Nearly 2wks postop R LILIA. She is doing well today and happy with progress. Begin outpatient PT. All questions and concerns were addressed. Follow up in 6 weeks and repeat XR. Recommended she take TUMS with the aspirin and take with a full stomach 8/8/23: PO #2 7 weeks s/p R LILIA. Recovering slowly. Pt in still significant amt of pain on exam and with ambulation. She does have improvement overall and is feeling better than prior to surgery. Pt c/o constipation- likely residual symptoms from meds administered during surgery, but is improving. Denied anti-inflam today. Will cont to monitor. Continue with PT. Follow up in 6 weeks. 9/19/23: PO #3. 3 months s/p R LILIA. Still having some muscular pain but continuing to improve. Continue with PT. Advised patient to gradually return to daily life activities- no restrictions. Follow up 1 year or sooner if having worsening pain. 11/17/23: New onset right hip pain 2 weeks ago, no hip findings today.  Has valgus OA with adv PF OA in right knee could be contributing - inj todya and reeval in 4 weeks if pain persists.  No issues with hip seen at this time. 1/19/24: Combined hip and back pain - XRs hip look well fixed, Lspine fusion intact but has extensive scoliosis which likely is causing pain down leg.  Recc MRI right hip eval fluid collection, MRI Lspine eval disc herniation and blood work r/o infection. 2/2/24: Still has global sensitivity to light touch. Mildly elevated ESR and CRP. Small effusion on R hip MRI. Admits to having pneumonia at the end of December. Unsure if the elevated bloodwork could be due to small infection vs. inflammatory process vs. residual from her pneumonia. Will repeat ESR and CRP- instructed to have this done in 2 weeks. Arthrotec rx. f/up in 1 month.  3/1/24: 8 months s/p R LILIA still with global pain and effusion. Mechanically XR look good. Still with elevated labs- CRP 8.6, ESR 31. Due to elevated labs and continued pain we will aspirate the hip and send fluid to eval for infection.  I am concerned for infection however without definitive results and her underlying spine disease could be possibly contributing I cannot proceed with surgical intervention.  If her aspiration is positive I think that liner exchange versus two-stage would be our best option.  She agrees with the plan and agrees with waiting until aspiration results follow up after aspiration. Follow up 2 weeks   3/15/24: Reviewed aspiration results- findings discussed. B/w and aspiration negative for infection. Lumbar MRI showing no obvious disease. No displacement or loosening seen on XR- unsure of reason for her pain. Unable to NSAIDs, pain meds, neurologic medicines, and steroids. Offered referral to pain management for possible MELECIO- she does not want injection. She will go ahead with her kidney stone procedure and follow up 3 months for repeat XR.

## 2024-04-09 ENCOUNTER — RX RENEWAL (OUTPATIENT)
Age: 69
End: 2024-04-09

## 2024-04-16 ENCOUNTER — APPOINTMENT (OUTPATIENT)
Dept: FAMILY MEDICINE | Facility: CLINIC | Age: 69
End: 2024-04-16
Payer: MEDICARE

## 2024-04-16 ENCOUNTER — RX RENEWAL (OUTPATIENT)
Age: 69
End: 2024-04-16

## 2024-04-16 VITALS
BODY MASS INDEX: 30.23 KG/M2 | HEART RATE: 80 BPM | SYSTOLIC BLOOD PRESSURE: 110 MMHG | OXYGEN SATURATION: 98 % | DIASTOLIC BLOOD PRESSURE: 70 MMHG | HEIGHT: 60 IN | WEIGHT: 154 LBS

## 2024-04-16 DIAGNOSIS — F41.9 ANXIETY DISORDER, UNSPECIFIED: ICD-10-CM

## 2024-04-16 DIAGNOSIS — M25.551 PAIN IN RIGHT HIP: ICD-10-CM

## 2024-04-16 PROCEDURE — 99214 OFFICE O/P EST MOD 30 MIN: CPT

## 2024-04-16 RX ORDER — FLUCONAZOLE 150 MG/1
150 TABLET ORAL
Qty: 2 | Refills: 0 | Status: COMPLETED | COMMUNITY
Start: 2024-01-09 | End: 2024-04-16

## 2024-04-16 RX ORDER — CLONAZEPAM 1 MG/1
1 TABLET ORAL
Qty: 90 | Refills: 0 | Status: ACTIVE | COMMUNITY
Start: 2017-09-15 | End: 1900-01-01

## 2024-04-16 RX ORDER — LEVOTHYROXINE SODIUM 0.14 MG/1
137 TABLET ORAL
Qty: 90 | Refills: 0 | Status: ACTIVE | COMMUNITY
Start: 2023-03-08 | End: 1900-01-01

## 2024-04-16 NOTE — HISTORY OF PRESENT ILLNESS
[FreeTextEntry8] : 69 yo female s/p  R THR 6/2023 c cont'd progressive R sided hip pain, groin pain and R lateral thigh pain.   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 Deuce Perez

## 2024-04-16 NOTE — PHYSICAL EXAM
[No Acute Distress] : no acute distress [EOMI] : extraocular movements intact [Normal Outer Ear/Nose] : the outer ears and nose were normal in appearance [No JVD] : no jugular venous distention [No Respiratory Distress] : no respiratory distress  [No Accessory Muscle Use] : no accessory muscle use [Clear to Auscultation] : lungs were clear to auscultation bilaterally [Normal Rate] : normal rate  [Regular Rhythm] : with a regular rhythm [Normal S1, S2] : normal S1 and S2 [No Edema] : there was no peripheral edema [Soft] : abdomen soft [Non Tender] : non-tender [Non-distended] : non-distended [No CVA Tenderness] : no CVA  tenderness [No Rash] : no rash [Coordination Grossly Intact] : coordination grossly intact [Speech Grossly Normal] : speech grossly normal [Normal Affect] : the affect was normal [Normal Mood] : the mood was normal [Normal Insight/Judgement] : insight and judgment were intact [de-identified] : unstable gait 2n2 R hip  pain

## 2024-04-22 ENCOUNTER — NON-APPOINTMENT (OUTPATIENT)
Age: 69
End: 2024-04-22

## 2024-05-17 ENCOUNTER — APPOINTMENT (OUTPATIENT)
Dept: ORTHOPEDIC SURGERY | Facility: CLINIC | Age: 69
End: 2024-05-17
Payer: MEDICARE

## 2024-05-17 VITALS
HEART RATE: 74 BPM | BODY MASS INDEX: 29.45 KG/M2 | HEIGHT: 60 IN | WEIGHT: 150 LBS | DIASTOLIC BLOOD PRESSURE: 77 MMHG | SYSTOLIC BLOOD PRESSURE: 129 MMHG

## 2024-05-17 DIAGNOSIS — M70.61 TROCHANTERIC BURSITIS, RIGHT HIP: ICD-10-CM

## 2024-05-17 PROCEDURE — 73502 X-RAY EXAM HIP UNI 2-3 VIEWS: CPT

## 2024-05-17 PROCEDURE — 99204 OFFICE O/P NEW MOD 45 MIN: CPT

## 2024-05-19 NOTE — DISCUSSION/SUMMARY
[de-identified] : I discussed the clinical, radiographic and historical findings with the patient.  At this time I do not see any signs of failure of the right total hip replacement and she has undergone an extensive workup to rule out infection and overt loosening.  I would like her to try an ultrasound-guided injection for the right greater trochanter bursitis that she is suffering from and repeat evaluate after that is complete.  Her pain control is somewhat complicated as she cannot take prednisone due to an allergic reaction previously and NSAIDs do irritate her stomach.  These difficulties make any overt inflammation difficult to control however we will start with the injection and she will follow-up with me after is complete.

## 2024-05-19 NOTE — HISTORY OF PRESENT ILLNESS
[de-identified] : MARI ROBERTO  is an 68 year-old female presents today with a chief complaint of right hip pain status post right total hip on 6/2023 complicated by postoperative pain in the anterior thigh and groin.  She also has a history of posterior laminectomy and fusion.  She has undergone extensive workup including inflammatory markers which were mildly elevated with an ESR of 31 and a CRP of 8.6, MRI with a small hip effusion but no overt signs of loosening and a CT scan of the abdomen pelvis with no signs of loosening.  She is coming in for further evaluation.  She points to the lateral thigh as well as the anterior thigh and groin as maximal source of pain.  Pain is 8 out of 10.  Notices pain more with walking and standing.  She has done physical therapy without much relief.  A complete review of symptoms as well as past medical/surgical history, medications, allergies, social and family history, and other details of HPI and exam were reviewed per first visit intake form and updated accordingly. Additional and more relevant details are noted in further detail today.

## 2024-05-19 NOTE — PHYSICAL EXAM
[de-identified] : General Appearance / Station: Well developed, well nourished, in no acute distress Orientation: Oriented to person, place, and time Gait & Station: Ambulates without assistive device Neurologic: Normal leg sensation Cardiovascular: Warm extremity Lymphatics: No lymphedema Generalized Ligament Laxity: Normal Stiffness: Normal.  LUMBAR SPINE Nontender at midline lumbar spine.  Right paraspinal muscle pain Straight leg raise: Negative Motor: 5/5 motor L2-S1 Sensation Intact. No paresthesias L2-S1  SYMPTOMATIC RIGHT HIP Range of motion: Painless L internal and external rotation of the hip. Strength: Within Normal Limits. Negative Trendelenburg sign                                                                      FADIR: Negative Stinchfield: Mild with anterior thighn Palpation: TENDER at greater trochanter and anterior thigh, Nontender SI joint                    ASYMPTOMATIC LEFT HIP Range of motion: Painless internal and external rotation of the hip. Skin: Normal Strength: Within normal limits JULY: Negative FADIR: Negative Stinchfield: Negative Palpation: Nontender at greater trochanter, Nontender at SI joint [de-identified] : MRI from [facility] dated 1/25/2024 shows status post right total hip arthroplasty with metallic artifact with questionable possible migration of the acetabular cup but there is no associated soft tissue edema or iliac marrow edema in the prosthesis appears normal on abdominal radiograph.  There is marrow edema or fluid at the hip with a femoral prosthesis.  There is minimal edema in the proximal fibers of the abductor longus at their origin from the right pubic bone with a low-grade muscle strain  Imaging: AP Pelvis and lateral views of the right hip show satisfactory placement of a right cementless total hip arthroplasty. There are no changes in alignment of hardware and no signs of radiographic failure compared to previous radiographs on 1/19/2024.

## 2024-05-20 ENCOUNTER — NON-APPOINTMENT (OUTPATIENT)
Age: 69
End: 2024-05-20

## 2024-05-21 DIAGNOSIS — M72.9 FIBROBLASTIC DISORDER, UNSPECIFIED: ICD-10-CM

## 2024-06-07 ENCOUNTER — OFFICE (OUTPATIENT)
Dept: URBAN - METROPOLITAN AREA CLINIC 109 | Facility: CLINIC | Age: 69
Setting detail: OPHTHALMOLOGY
End: 2024-06-07
Payer: MEDICARE

## 2024-06-07 DIAGNOSIS — H00.12: ICD-10-CM

## 2024-06-07 PROCEDURE — 99213 OFFICE O/P EST LOW 20 MIN: CPT | Performed by: OPHTHALMOLOGY

## 2024-06-07 ASSESSMENT — CONFRONTATIONAL VISUAL FIELD TEST (CVF)
OD_FINDINGS: FULL
OS_FINDINGS: FULL

## 2024-06-11 ENCOUNTER — APPOINTMENT (OUTPATIENT)
Dept: ORTHOPEDIC SURGERY | Facility: CLINIC | Age: 69
End: 2024-06-11

## 2024-06-21 ENCOUNTER — APPOINTMENT (OUTPATIENT)
Dept: ORTHOPEDIC SURGERY | Facility: CLINIC | Age: 69
End: 2024-06-21
Payer: MEDICARE

## 2024-06-21 VITALS — HEIGHT: 60 IN | WEIGHT: 150 LBS | BODY MASS INDEX: 29.45 KG/M2

## 2024-06-21 DIAGNOSIS — Z96.641 PRESENCE OF RIGHT ARTIFICIAL HIP JOINT: ICD-10-CM

## 2024-06-21 PROCEDURE — 99213 OFFICE O/P EST LOW 20 MIN: CPT

## 2024-06-21 PROCEDURE — 73503 X-RAY EXAM HIP UNI 4/> VIEWS: CPT | Mod: RT

## 2024-06-21 NOTE — ASSESSMENT
[FreeTextEntry1] : Previous doc: 4/18/23: Mod/sev OA Right Hip on xrays today - outside MRI reviewed - Hx of Lumbar fusion and osteopenia. Due to hx of spine surgery will use bimentum cup. Will benefit from Right LILIA - does not do well with oxycodone, morphine, hydrocodone; states she does do well with diladid failed PT, NSAIDs pain affecting ADLs - does not want inj at this point = 6/27/23: Nearly 2wks postop R LILIA. She is doing well today and happy with progress. Begin outpatient PT. All questions and concerns were addressed. Follow up in 6 weeks and repeat XR. Recommended she take TUMS with the aspirin and take with a full stomach 8/8/23: PO #2 7 weeks s/p R LILIA. Recovering slowly. Pt in still significant amt of pain on exam and with ambulation. She does have improvement overall and is feeling better than prior to surgery. Pt c/o constipation- likely residual symptoms from meds administered during surgery, but is improving. Denied anti-inflam today. Will cont to monitor. Continue with PT. Follow up in 6 weeks. 9/19/23: PO #3. 3 months s/p R LILIA. Still having some muscular pain but continuing to improve. Continue with PT. Advised patient to gradually return to daily life activities- no restrictions. Follow up 1 year or sooner if having worsening pain. 11/17/23: New onset right hip pain 2 weeks ago, no hip findings today.  Has valgus OA with adv PF OA in right knee could be contributing - inj todya and reeval in 4 weeks if pain persists.  No issues with hip seen at this time. 1/19/24: Combined hip and back pain - XRs hip look well fixed, Lspine fusion intact but has extensive scoliosis which likely is causing pain down leg.  Recc MRI right hip eval fluid collection, MRI Lspine eval disc herniation and blood work r/o infection. 2/2/24: Still has global sensitivity to light touch. Mildly elevated ESR and CRP. Small effusion on R hip MRI. Admits to having pneumonia at the end of December. Unsure if the elevated bloodwork could be due to small infection vs. inflammatory process vs. residual from her pneumonia. Will repeat ESR and CRP- instructed to have this done in 2 weeks. Arthrotec rx. f/up in 1 month.   3/1/24: 8 months s/p R LILIA still with global pain and effusion. Mechanically XR look good. Still with elevated labs- CRP 8.6, ESR 31. Due to elevated labs and continued pain we will aspirate the hip and send fluid to eval for infection.  I am concerned for infection however without definitive results and her underlying spine disease could be possibly contributing I cannot proceed with surgical intervention.  If her aspiration is positive I think that liner exchange versus two-stage would be our best option.  Dans this agrees with the plan agrees with waiting until aspiration results follow up after aspiration. Follow up 2 weeks   6/21/24: 1 year postop cont groin pain, unsure reason for pain.  XRs well fixed, no signs of poor positioning.  Negative workup.  her pain is very nonspecific in groin and thigh.  Recc f/u with her spine surgeon for further eval - she has not seen him since the LILIA.  I do not see enough psoas pain to recc inj for this either. Although unlikly I will send her for allergy test she has a Stainless steel Cup

## 2024-06-21 NOTE — DISCUSSION/SUMMARY
[de-identified] : The patient was advised of the diagnosis.  The natural history of the pathology was explained in full to the patient in layman's terms. All questions were answered.  The risks and benefits of surgical and non-surgical treatment alternatives were explained in full to the patient.

## 2024-06-21 NOTE — IMAGING
[de-identified] : Right hip: Inc healed.  No swelling.  No tenderness.  Mild groin pain with IR.  NVI.  Right knee: Valgus alignment.  No effusion.  Lateral joint line tenderness.  Lspine: Pos sitting SLR.

## 2024-06-21 NOTE — HISTORY OF PRESENT ILLNESS
[Dull/Aching] : dull/aching [Radiating] : radiating [Intermittent] : intermittent [de-identified] : 3/1/24: Here to review repeat blood work ESR and CRP. 8 months s/p R LILIA. Stopped arthrotec due to gi issues. Continued pain in the groin and laterally. States her RT ankle is swollen.   Previous doc:  4/18/23: Patient is a 66 yo female c/o right hip for 1 year with no specific injury. Taking advil as needed for pain. Occasional n/t down leg. Pain is worse with walking, stairs and getting up from seated position. No previous injuries or surgeries to right hip. Has seen another ortho who ordered MRI (Dr. Slade). 6/27/23: 13 days s/p R LILIA. She notes pain is better than prior to surgery. Amb with walker. Not taking oxy. States she has some stomach issues with the aspirin, but she is taking it alongside Pepcid  8/8/23: PO #2 7 weeks R LILIA. Pt still having pain with walking and physical activity. Pain in groin. Currently in PT- feels she is making progress but slow. Pt stopped using walker and now using cane to ambulate. Taking advil for pain.  9/19/23: PO #3. 3 months s/p R LILIA. Reports having pain but different kind of pain than prior to surgery- more incisional pain. States she has good and bad days but overall feels she is progressing well. Currently doing PT which she feels is beneficial. Had sciatic episode 2 weeks ago, and sharp pains around the incision about a week ago. Reports clicking in the RT knee. Taking advil with good relief.  Ambulates with cane.  11/17/23: Started having some pain over the past 2 weeks, has been better over the past few days.  Pain radiates into knee. 1/19/24: Cont pain throughout right leg.  Has some back pain.  Knee injection last time helped the knee but still has leg pain.  Some numbness/tingling. 2/2/24: Here to review MRI R hip and L-spine. She also had bloodwork done [] : no [FreeTextEntry1] : right hip  [FreeTextEntry5] : MARI is here for right hip follow up. states hip and groin pain is the same, some radiating pain down to knee. R ankle swelling decreased.

## 2024-06-28 ENCOUNTER — APPOINTMENT (OUTPATIENT)
Dept: FAMILY MEDICINE | Facility: CLINIC | Age: 69
End: 2024-06-28
Payer: MEDICARE

## 2024-06-28 VITALS
OXYGEN SATURATION: 98 % | SYSTOLIC BLOOD PRESSURE: 122 MMHG | WEIGHT: 150 LBS | BODY MASS INDEX: 29.45 KG/M2 | HEART RATE: 75 BPM | DIASTOLIC BLOOD PRESSURE: 80 MMHG | HEIGHT: 60 IN

## 2024-06-28 DIAGNOSIS — R73.9 HYPERGLYCEMIA, UNSPECIFIED: ICD-10-CM

## 2024-06-28 DIAGNOSIS — E03.9 HYPOTHYROIDISM, UNSPECIFIED: ICD-10-CM

## 2024-06-28 DIAGNOSIS — E78.5 HYPERLIPIDEMIA, UNSPECIFIED: ICD-10-CM

## 2024-06-28 DIAGNOSIS — R10.30 LOWER ABDOMINAL PAIN, UNSPECIFIED: ICD-10-CM

## 2024-06-28 DIAGNOSIS — E55.9 VITAMIN D DEFICIENCY, UNSPECIFIED: ICD-10-CM

## 2024-06-28 DIAGNOSIS — J45.909 UNSPECIFIED ASTHMA, UNCOMPLICATED: ICD-10-CM

## 2024-06-28 PROCEDURE — 99214 OFFICE O/P EST MOD 30 MIN: CPT

## 2024-06-29 ENCOUNTER — APPOINTMENT (OUTPATIENT)
Dept: OBGYN | Facility: CLINIC | Age: 69
End: 2024-06-29

## 2024-07-11 ENCOUNTER — RX RENEWAL (OUTPATIENT)
Age: 69
End: 2024-07-11

## 2024-08-15 ENCOUNTER — APPOINTMENT (OUTPATIENT)
Dept: OBGYN | Facility: CLINIC | Age: 69
End: 2024-08-15
Payer: MEDICARE

## 2024-08-15 VITALS
SYSTOLIC BLOOD PRESSURE: 124 MMHG | DIASTOLIC BLOOD PRESSURE: 82 MMHG | WEIGHT: 148 LBS | BODY MASS INDEX: 29.06 KG/M2 | HEIGHT: 60 IN

## 2024-08-15 DIAGNOSIS — Z78.9 OTHER SPECIFIED HEALTH STATUS: ICD-10-CM

## 2024-08-15 DIAGNOSIS — Z80.3 FAMILY HISTORY OF MALIGNANT NEOPLASM OF BREAST: ICD-10-CM

## 2024-08-15 DIAGNOSIS — Z87.898 PERSONAL HISTORY OF OTHER SPECIFIED CONDITIONS: ICD-10-CM

## 2024-08-15 DIAGNOSIS — R82.90 UNSPECIFIED ABNORMAL FINDINGS IN URINE: ICD-10-CM

## 2024-08-15 DIAGNOSIS — R33.9 RETENTION OF URINE, UNSPECIFIED: ICD-10-CM

## 2024-08-15 DIAGNOSIS — Z01.419 ENCOUNTER FOR GYNECOLOGICAL EXAMINATION (GENERAL) (ROUTINE) W/OUT ABNORMAL FINDINGS: ICD-10-CM

## 2024-08-15 DIAGNOSIS — Z87.891 PERSONAL HISTORY OF NICOTINE DEPENDENCE: ICD-10-CM

## 2024-08-15 DIAGNOSIS — R10.2 PELVIC AND PERINEAL PAIN: ICD-10-CM

## 2024-08-15 DIAGNOSIS — N89.8 OTHER SPECIFIED NONINFLAMMATORY DISORDERS OF VAGINA: ICD-10-CM

## 2024-08-15 LAB
APPEARANCE: CLEAR
BILIRUBIN URINE: NEGATIVE
BLOOD URINE: ABNORMAL
COLOR: YELLOW
GLUCOSE QUALITATIVE U: NEGATIVE
KETONES URINE: NEGATIVE
LEUKOCYTE ESTERASE URINE: NEGATIVE
NITRITE URINE: NEGATIVE
PH URINE: 5.5
PROTEIN URINE: NEGATIVE
SPECIFIC GRAVITY URINE: >=1.03
UROBILINOGEN URINE: 0.2 (ref 0.2–?)

## 2024-08-15 PROCEDURE — 99459 PELVIC EXAMINATION: CPT

## 2024-08-15 PROCEDURE — 99213 OFFICE O/P EST LOW 20 MIN: CPT

## 2024-08-15 RX ORDER — TERCONAZOLE 8 MG/G
0.8 CREAM VAGINAL
Qty: 1 | Refills: 4 | Status: ACTIVE | COMMUNITY
Start: 2024-08-15 | End: 1900-01-01

## 2024-08-15 NOTE — PHYSICAL EXAM
[Chaperone Present] : A chaperone was present in the examining room during all aspects of the physical examination [[id number]] : A chaperone was present during the pelvic exam. [Appropriately responsive] : appropriately responsive [Alert] : alert [No Acute Distress] : no acute distress [Oriented x3] : oriented x3 [Labia Majora] : normal [Labia Minora] : normal [Atrophy] : atrophy [Dry Mucosa] : dry mucosa [No Bleeding] : There was no active vaginal bleeding [Normal] : normal [Uterine Adnexae] : normal [Discharge] : a  ~M vaginal discharge was present [Moderate] : moderate [White] : white [Thick] : thick

## 2024-08-15 NOTE — PHYSICAL EXAM
[Chaperone Present] : A chaperone was present in the examining room during all aspects of the physical examination [[id number]] : A chaperone was present during the pelvic exam. [Appropriately responsive] : appropriately responsive [Alert] : alert [No Acute Distress] : no acute distress [Oriented x3] : oriented x3 [Labia Majora] : normal [Labia Minora] : normal [Atrophy] : atrophy [Dry Mucosa] : dry mucosa [No Bleeding] : There was no active vaginal bleeding [Normal] : normal [Uterine Adnexae] : normal [Discharge] : a  ~M vaginal discharge was present [Moderate] : moderate [White] : white [Thick] : thick

## 2024-08-19 PROBLEM — Z87.898 HISTORY OF ABDOMINAL PAIN: Status: RESOLVED | Noted: 2023-03-02 | Resolved: 2024-08-19

## 2024-08-19 PROBLEM — Z78.9 CURRENT NON-SMOKER: Status: ACTIVE | Noted: 2024-08-15

## 2024-08-19 PROBLEM — R33.9 INCOMPLETE BLADDER EMPTYING: Status: RESOLVED | Noted: 2019-09-13 | Resolved: 2024-08-19

## 2024-08-19 PROBLEM — Z87.891 FORMER SMOKER: Status: ACTIVE | Noted: 2024-08-15

## 2024-08-19 PROBLEM — Z01.419 ENCOUNTER FOR WELL WOMAN EXAM WITH ROUTINE GYNECOLOGICAL EXAM: Status: RESOLVED | Noted: 2024-02-06 | Resolved: 2024-08-19

## 2024-08-19 PROBLEM — R10.2 PELVIC PAIN: Status: RESOLVED | Noted: 2019-09-13 | Resolved: 2024-08-19

## 2024-08-19 NOTE — END OF VISIT
[FreeTextEntry3] : I, Ana Jones solely acted as scribe for Dr. Aleah Wang on 08/15/2024  All medical entries made by the Scribe were at my, Dr. Bryant, direction and personally dictated by me on 08/15/2024 . I have reviewed the chart and agree that the record accurately reflects my personal performance of the history, physical exam, assessment and plan. I have also personally directed, reviewed, and agreed with the chart.

## 2024-08-19 NOTE — HISTORY OF PRESENT ILLNESS
[HPV test offered] : HPV test offered [N] : Patient reports normal menses [Y] : Positive pregnancy history [Menarche Age: ____] : age at menarche was [unfilled] [Currently Active] : currently active [Men] : men [Mammogramdate] : 04/22/2024 [TextBox_19] : BR1 [PapSmeardate] : 02/06/2024 [TextBox_31] : Negative [BoneDensityDate] : 02/22/2023 [TextBox_37] : Osteopenia [ColonoscopyDate] : 07/06/2022 [TextBox_43] : Polyp, Pt was told to repeat Colonoscopy in 5 years.  [TextBox_78] : Negative [HPVDate] : 02/06/2024 [LMPDate] : 1992 [de-identified] : had a Total Hysterectomy.  [PGxTotal] : 6 [[facility] : 5 [[facility]] : 5 [PGHxABSpont] : 1 [FreeTextEntry1] : 1992

## 2024-08-19 NOTE — PLAN
[FreeTextEntry1] : UCX sent out to r/o a UTI.   Thick white vaginal discharge consistent with yeast. Prescription for Terconazole given.   F/u annually or as needed.

## 2024-08-19 NOTE — END OF VISIT
[FreeTextEntry3] : I, Ana Joens solely acted as scribe for Dr. Aleah Wang on 08/15/2024  All medical entries made by the Scribe were at my, Dr. Bryant, direction and personally dictated by me on 08/15/2024 . I have reviewed the chart and agree that the record accurately reflects my personal performance of the history, physical exam, assessment and plan. I have also personally directed, reviewed, and agreed with the chart.

## 2024-08-19 NOTE — HISTORY OF PRESENT ILLNESS
[HPV test offered] : HPV test offered [N] : Patient reports normal menses [Y] : Positive pregnancy history [Menarche Age: ____] : age at menarche was [unfilled] [Currently Active] : currently active [Men] : men [Mammogramdate] : 04/22/2024 [TextBox_19] : BR1 [PapSmeardate] : 02/06/2024 [TextBox_31] : Negative [BoneDensityDate] : 02/22/2023 [TextBox_37] : Osteopenia [ColonoscopyDate] : 07/06/2022 [TextBox_43] : Polyp, Pt was told to repeat Colonoscopy in 5 years.  [HPVDate] : 02/06/2024 [TextBox_78] : Negative [LMPDate] : 1992 [de-identified] : had a Total Hysterectomy.  [PGxTotal] : 6 [[facility]] : 5 [[facility]] : 5 [PGHxABSpont] : 1 [FreeTextEntry1] : 1992

## 2024-08-29 ENCOUNTER — RX RENEWAL (OUTPATIENT)
Age: 69
End: 2024-08-29

## 2024-09-12 ENCOUNTER — APPOINTMENT (OUTPATIENT)
Dept: ORTHOPEDIC SURGERY | Facility: CLINIC | Age: 69
End: 2024-09-12
Payer: MEDICARE

## 2024-09-12 VITALS
HEART RATE: 60 BPM | BODY MASS INDEX: 29.45 KG/M2 | WEIGHT: 150 LBS | DIASTOLIC BLOOD PRESSURE: 75 MMHG | HEIGHT: 60 IN | SYSTOLIC BLOOD PRESSURE: 117 MMHG

## 2024-09-12 DIAGNOSIS — Z96.641 PRESENCE OF RIGHT ARTIFICIAL HIP JOINT: ICD-10-CM

## 2024-09-12 PROCEDURE — 99214 OFFICE O/P EST MOD 30 MIN: CPT

## 2024-09-12 PROCEDURE — 73502 X-RAY EXAM HIP UNI 2-3 VIEWS: CPT

## 2024-09-12 PROCEDURE — G2211 COMPLEX E/M VISIT ADD ON: CPT

## 2024-09-12 NOTE — DISCUSSION/SUMMARY
[de-identified] :  MARI ROBERTO is a 68 year old female who presents with a well fixed right hip replacement.  She underwent workup for infection which was negative per the patient.  She also underwent an MRI which did not reveal any loosening of the implants or surrounding fluid collections.  The patient's pain is out of proportion to the objective findings on exam and she is hypersensitive to light touch of the skin all around the hip and proximal thigh.  I recommended she follow-up with pain management for a possible regional pain syndrome for further management.  There is no indication for revision hip replacement.  She was also advised to continue to follow-up with her spine physician as an alternative etiology contributing to her discomfort

## 2024-09-12 NOTE — HISTORY OF PRESENT ILLNESS
[de-identified] : Ms. MARI ROBERTO is a 68 year old female presenting for evaluation of right hip pain. Patient had a right hip replacement  with Dr Pruitt 15 months ago. She has had pain in the hip region since surgery. She generalizes the pain to the groin, as well as lateral aspect of the leg and buttock. She denies any falls or trauma. She had the hip aspirated in May 2024 resulting in a negative culture. She has seen her spine doctor as well who is denying any spinal involvement. Patient has tried PT and NSAIDs without improvement.

## 2024-09-12 NOTE — PHYSICAL EXAM
[de-identified] :  The patient appears well nourished and in no apparent distress. The patient is alert and oriented to person, place, and time. Affect and mood appear normal. The head is normocephalic and atraumatic. The eyes reveal normal sclera and extra ocular muscles are intact. The tongue is midline with no apparent lesions. Skin shows normal turgor with no evidence of eczema or psoriasis. No respiratory distress noted. Sensation grossly intact. [de-identified] :  Exam of the right hip shows hip internal rotation of 25 degrees.  No pain with internal rotation.  External rotation 30 degrees with mild pain.  She can do a straight leg raise.  She is able to walk with no limp.  She is hyper-sensitive to the slightest touch of her hip along the lateral tissues when lying in the left lateral decubitus position. 5/5 motor strength bilaterally distally. Sensation intact distally. [de-identified] : X-ray: AP of the pelvis and 2 views of the right hip demonstrate a right total hip arthroplasty in good position, with no evidence of fracture, loosening, or dislocation.

## 2024-09-30 ENCOUNTER — OFFICE (OUTPATIENT)
Dept: URBAN - METROPOLITAN AREA CLINIC 109 | Facility: CLINIC | Age: 69
Setting detail: OPHTHALMOLOGY
End: 2024-09-30
Payer: MEDICARE

## 2024-09-30 DIAGNOSIS — H25.13: ICD-10-CM

## 2024-09-30 DIAGNOSIS — H40.013: ICD-10-CM

## 2024-09-30 DIAGNOSIS — H16.223: ICD-10-CM

## 2024-09-30 PROCEDURE — 92014 COMPRE OPH EXAM EST PT 1/>: CPT | Performed by: OPHTHALMOLOGY

## 2024-09-30 PROCEDURE — 92133 CPTRZD OPH DX IMG PST SGM ON: CPT | Performed by: OPHTHALMOLOGY

## 2024-09-30 ASSESSMENT — CONFRONTATIONAL VISUAL FIELD TEST (CVF)
OD_FINDINGS: FULL
OS_FINDINGS: FULL

## 2024-10-03 ENCOUNTER — RX RENEWAL (OUTPATIENT)
Age: 69
End: 2024-10-03

## 2024-10-21 ENCOUNTER — MED ADMIN CHARGE (OUTPATIENT)
Age: 69
End: 2024-10-21

## 2024-10-21 ENCOUNTER — APPOINTMENT (OUTPATIENT)
Dept: FAMILY MEDICINE | Facility: CLINIC | Age: 69
End: 2024-10-21

## 2024-10-21 VITALS
DIASTOLIC BLOOD PRESSURE: 82 MMHG | OXYGEN SATURATION: 98 % | WEIGHT: 150 LBS | BODY MASS INDEX: 29.45 KG/M2 | HEART RATE: 65 BPM | HEIGHT: 60 IN | SYSTOLIC BLOOD PRESSURE: 122 MMHG

## 2024-10-21 DIAGNOSIS — Z96.641 PAIN IN RIGHT HIP: ICD-10-CM

## 2024-10-21 DIAGNOSIS — E78.5 HYPERLIPIDEMIA, UNSPECIFIED: ICD-10-CM

## 2024-10-21 DIAGNOSIS — R73.9 HYPERGLYCEMIA, UNSPECIFIED: ICD-10-CM

## 2024-10-21 DIAGNOSIS — M25.551 PAIN IN RIGHT HIP: ICD-10-CM

## 2024-10-21 DIAGNOSIS — E03.9 HYPOTHYROIDISM, UNSPECIFIED: ICD-10-CM

## 2024-10-21 DIAGNOSIS — Z23 ENCOUNTER FOR IMMUNIZATION: ICD-10-CM

## 2024-10-21 DIAGNOSIS — G89.29 PAIN IN RIGHT HIP: ICD-10-CM

## 2024-10-21 PROCEDURE — 90662 IIV NO PRSV INCREASED AG IM: CPT

## 2024-10-21 PROCEDURE — 99214 OFFICE O/P EST MOD 30 MIN: CPT | Mod: 25

## 2024-10-21 PROCEDURE — G0008: CPT

## 2024-10-21 PROCEDURE — 36415 COLL VENOUS BLD VENIPUNCTURE: CPT

## 2024-10-21 RX ORDER — FLUCONAZOLE 100 MG/1
100 TABLET ORAL
Qty: 3 | Refills: 0 | Status: COMPLETED | COMMUNITY
Start: 2024-08-16

## 2024-10-21 RX ORDER — TOBRAMYCIN AND DEXAMETHASONE 3; 1 MG/ML; MG/ML
0.3-0.1 SUSPENSION/ DROPS OPHTHALMIC
Qty: 2 | Refills: 0 | Status: COMPLETED | COMMUNITY
Start: 2024-06-07

## 2024-10-21 RX ORDER — BENZONATATE 200 MG/1
200 CAPSULE ORAL 3 TIMES DAILY
Qty: 30 | Refills: 3 | Status: ACTIVE | COMMUNITY
Start: 2024-10-21 | End: 1900-01-01

## 2024-10-21 RX ORDER — NITROFURANTOIN MACROCRYSTALS 50 MG/1
50 CAPSULE ORAL
Qty: 90 | Refills: 0 | Status: COMPLETED | COMMUNITY
Start: 2024-09-06

## 2024-11-18 ENCOUNTER — APPOINTMENT (OUTPATIENT)
Dept: FAMILY MEDICINE | Facility: CLINIC | Age: 69
End: 2024-11-18
Payer: MEDICARE

## 2024-11-18 VITALS
HEIGHT: 60 IN | SYSTOLIC BLOOD PRESSURE: 114 MMHG | DIASTOLIC BLOOD PRESSURE: 70 MMHG | HEART RATE: 66 BPM | WEIGHT: 150 LBS | OXYGEN SATURATION: 98 % | BODY MASS INDEX: 29.45 KG/M2

## 2024-11-18 DIAGNOSIS — E78.5 HYPERLIPIDEMIA, UNSPECIFIED: ICD-10-CM

## 2024-11-18 DIAGNOSIS — K27.9 PEPTIC ULCER, SITE UNSPECIFIED, UNSPECIFIED AS ACUTE OR CHRONIC, W/OUT HEMORRHAGE OR PERFORATION: ICD-10-CM

## 2024-11-18 DIAGNOSIS — R73.9 HYPERGLYCEMIA, UNSPECIFIED: ICD-10-CM

## 2024-11-18 DIAGNOSIS — M25.551 PAIN IN RIGHT HIP: ICD-10-CM

## 2024-11-18 DIAGNOSIS — M85.80 OTHER SPECIFIED DISORDERS OF BONE DENSITY AND STRUCTURE, UNSPECIFIED SITE: ICD-10-CM

## 2024-11-18 DIAGNOSIS — R53.83 OTHER FATIGUE: ICD-10-CM

## 2024-11-18 DIAGNOSIS — E03.9 HYPOTHYROIDISM, UNSPECIFIED: ICD-10-CM

## 2024-11-18 DIAGNOSIS — Z00.00 ENCOUNTER FOR GENERAL ADULT MEDICAL EXAMINATION W/OUT ABNORMAL FINDINGS: ICD-10-CM

## 2024-11-18 PROCEDURE — G0439: CPT

## 2024-11-18 PROCEDURE — 93000 ELECTROCARDIOGRAM COMPLETE: CPT

## 2024-11-18 RX ORDER — FAMOTIDINE 20 MG/1
20 TABLET, FILM COATED ORAL
Qty: 180 | Refills: 0 | Status: ACTIVE | COMMUNITY
Start: 2024-11-05

## 2024-11-18 RX ORDER — PANTOPRAZOLE 20 MG/1
20 TABLET, DELAYED RELEASE ORAL
Qty: 90 | Refills: 0 | Status: ACTIVE | COMMUNITY
Start: 2024-11-13

## 2024-12-16 NOTE — H&P ADULT - PROBLEM SELECTOR PLAN 5
[de-identified] : s/p septo/[id number]/15/24- his breathing and sleep are improved.  He is working with Dr. Woods regarding next steps with respect to his severe RUBY. 
Continue home Synthroid 137 mcg daily while inpatient

## 2024-12-30 ENCOUNTER — APPOINTMENT (OUTPATIENT)
Dept: FAMILY MEDICINE | Facility: CLINIC | Age: 69
End: 2024-12-30
Payer: MEDICARE

## 2024-12-30 VITALS
SYSTOLIC BLOOD PRESSURE: 122 MMHG | HEIGHT: 60 IN | DIASTOLIC BLOOD PRESSURE: 78 MMHG | BODY MASS INDEX: 28.86 KG/M2 | OXYGEN SATURATION: 96 % | WEIGHT: 147 LBS | TEMPERATURE: 98.1 F | HEART RATE: 76 BPM

## 2024-12-30 DIAGNOSIS — E03.9 HYPOTHYROIDISM, UNSPECIFIED: ICD-10-CM

## 2024-12-30 DIAGNOSIS — Z87.442 PERSONAL HISTORY OF URINARY CALCULI: ICD-10-CM

## 2024-12-30 DIAGNOSIS — E78.5 HYPERLIPIDEMIA, UNSPECIFIED: ICD-10-CM

## 2024-12-30 DIAGNOSIS — Z01.818 ENCOUNTER FOR OTHER PREPROCEDURAL EXAMINATION: ICD-10-CM

## 2024-12-30 DIAGNOSIS — J45.909 UNSPECIFIED ASTHMA, UNCOMPLICATED: ICD-10-CM

## 2024-12-30 PROCEDURE — 99214 OFFICE O/P EST MOD 30 MIN: CPT

## 2025-01-07 ENCOUNTER — RX RENEWAL (OUTPATIENT)
Age: 70
End: 2025-01-07

## 2025-02-25 ENCOUNTER — APPOINTMENT (OUTPATIENT)
Dept: OBGYN | Facility: CLINIC | Age: 70
End: 2025-02-25
Payer: MEDICARE

## 2025-02-25 VITALS
BODY MASS INDEX: 28.86 KG/M2 | HEIGHT: 60 IN | SYSTOLIC BLOOD PRESSURE: 116 MMHG | WEIGHT: 147 LBS | DIASTOLIC BLOOD PRESSURE: 68 MMHG

## 2025-02-25 DIAGNOSIS — Z01.818 ENCOUNTER FOR OTHER PREPROCEDURAL EXAMINATION: ICD-10-CM

## 2025-02-25 DIAGNOSIS — Z87.39 PERSONAL HISTORY OF OTHER DISEASES OF THE MUSCULOSKELETAL SYSTEM AND CONNECTIVE TISSUE: ICD-10-CM

## 2025-02-25 DIAGNOSIS — Z01.411 ENCOUNTER FOR GYNECOLOGICAL EXAMINATION (GENERAL) (ROUTINE) WITH ABNORMAL FINDINGS: ICD-10-CM

## 2025-02-25 DIAGNOSIS — Z12.31 ENCOUNTER FOR SCREENING MAMMOGRAM FOR MALIGNANT NEOPLASM OF BREAST: ICD-10-CM

## 2025-02-25 DIAGNOSIS — N95.2 POSTMENOPAUSAL ATROPHIC VAGINITIS: ICD-10-CM

## 2025-02-25 DIAGNOSIS — Z87.42 PERSONAL HISTORY OF OTHER DISEASES OF THE FEMALE GENITAL TRACT: ICD-10-CM

## 2025-02-25 DIAGNOSIS — M85.80 OTHER SPECIFIED DISORDERS OF BONE DENSITY AND STRUCTURE, UNSPECIFIED SITE: ICD-10-CM

## 2025-02-25 DIAGNOSIS — R87.612 LOW GRADE SQUAMOUS INTRAEPITHELIAL LESION ON CYTOLOGIC SMEAR OF CERVIX (LGSIL): ICD-10-CM

## 2025-02-25 DIAGNOSIS — Z01.419 ENCOUNTER FOR GYNECOLOGICAL EXAMINATION (GENERAL) (ROUTINE) W/OUT ABNORMAL FINDINGS: ICD-10-CM

## 2025-02-25 DIAGNOSIS — N89.8 OTHER SPECIFIED NONINFLAMMATORY DISORDERS OF VAGINA: ICD-10-CM

## 2025-02-25 LAB
APPEARANCE: CLEAR
BILIRUBIN URINE: NEGATIVE
BLOOD URINE: NEGATIVE
COLOR: YELLOW
GLUCOSE QUALITATIVE U: NEGATIVE
KETONES URINE: NEGATIVE
LEUKOCYTE ESTERASE URINE: NEGATIVE
NITRITE URINE: NEGATIVE
PH URINE: 5
PROTEIN URINE: NEGATIVE
SPECIFIC GRAVITY URINE: 1.02
UROBILINOGEN URINE: 0.2 (ref 0.2–?)

## 2025-02-25 PROCEDURE — 99213 OFFICE O/P EST LOW 20 MIN: CPT | Mod: 25

## 2025-02-25 PROCEDURE — G0101: CPT

## 2025-02-25 PROCEDURE — 99459 PELVIC EXAMINATION: CPT

## 2025-02-25 RX ORDER — TERCONAZOLE 8 MG/G
0.8 CREAM VAGINAL
Qty: 1 | Refills: 4 | Status: ACTIVE | COMMUNITY
Start: 2025-02-25 | End: 1900-01-01

## 2025-02-27 LAB
BV BACTERIA RRNA VAG QL NAA+PROBE: NOT DETECTED
C GLABRATA RNA VAG QL NAA+PROBE: NOT DETECTED
CANDIDA RRNA VAG QL PROBE: NOT DETECTED
T VAGINALIS RRNA SPEC QL NAA+PROBE: NOT DETECTED

## 2025-03-01 PROBLEM — N95.2 ATROPHIC VAGINITIS: Status: RESOLVED | Noted: 2019-09-13 | Resolved: 2025-03-01

## 2025-03-01 PROBLEM — R87.612 LGSIL ON PAP SMEAR OF CERVIX: Status: RESOLVED | Noted: 2020-02-25 | Resolved: 2025-03-01

## 2025-03-31 ENCOUNTER — EMERGENCY (EMERGENCY)
Facility: HOSPITAL | Age: 70
LOS: 1 days | Discharge: HOME | End: 2025-03-31
Attending: STUDENT IN AN ORGANIZED HEALTH CARE EDUCATION/TRAINING PROGRAM | Admitting: STUDENT IN AN ORGANIZED HEALTH CARE EDUCATION/TRAINING PROGRAM
Payer: MEDICARE

## 2025-03-31 VITALS — RESPIRATION RATE: 16 BRPM | SYSTOLIC BLOOD PRESSURE: 103 MMHG | DIASTOLIC BLOOD PRESSURE: 63 MMHG | HEART RATE: 74 BPM

## 2025-03-31 VITALS
RESPIRATION RATE: 17 BRPM | SYSTOLIC BLOOD PRESSURE: 148 MMHG | WEIGHT: 145.95 LBS | DIASTOLIC BLOOD PRESSURE: 93 MMHG | OXYGEN SATURATION: 99 % | HEART RATE: 84 BPM | TEMPERATURE: 98 F

## 2025-03-31 DIAGNOSIS — Z98.1 ARTHRODESIS STATUS: Chronic | ICD-10-CM

## 2025-03-31 LAB
ALBUMIN SERPL ELPH-MCNC: 3.2 G/DL — LOW (ref 3.3–5)
ALP SERPL-CCNC: 66 U/L — SIGNIFICANT CHANGE UP (ref 40–120)
ALT FLD-CCNC: 24 U/L — SIGNIFICANT CHANGE UP (ref 12–78)
ANION GAP SERPL CALC-SCNC: 4 MMOL/L — LOW (ref 5–17)
APPEARANCE UR: CLEAR — SIGNIFICANT CHANGE UP
AST SERPL-CCNC: 21 U/L — SIGNIFICANT CHANGE UP (ref 15–37)
BASOPHILS # BLD AUTO: 0.04 K/UL — SIGNIFICANT CHANGE UP (ref 0–0.2)
BASOPHILS NFR BLD AUTO: 0.3 % — SIGNIFICANT CHANGE UP (ref 0–2)
BILIRUB UR-MCNC: NEGATIVE — SIGNIFICANT CHANGE UP
BUN SERPL-MCNC: 18 MG/DL — SIGNIFICANT CHANGE UP (ref 7–23)
CALCIUM SERPL-MCNC: 9.1 MG/DL — SIGNIFICANT CHANGE UP (ref 8.5–10.1)
CHLORIDE SERPL-SCNC: 112 MMOL/L — HIGH (ref 96–108)
CO2 SERPL-SCNC: 25 MMOL/L — SIGNIFICANT CHANGE UP (ref 22–31)
COLOR SPEC: YELLOW — SIGNIFICANT CHANGE UP
CREAT SERPL-MCNC: 0.59 MG/DL — SIGNIFICANT CHANGE UP (ref 0.5–1.3)
DIFF PNL FLD: NEGATIVE — SIGNIFICANT CHANGE UP
EGFR: 98 ML/MIN/1.73M2 — SIGNIFICANT CHANGE UP
EGFR: 98 ML/MIN/1.73M2 — SIGNIFICANT CHANGE UP
EOSINOPHIL # BLD AUTO: 0.02 K/UL — SIGNIFICANT CHANGE UP (ref 0–0.5)
EOSINOPHIL NFR BLD AUTO: 0.2 % — SIGNIFICANT CHANGE UP (ref 0–6)
GLUCOSE SERPL-MCNC: 96 MG/DL — SIGNIFICANT CHANGE UP (ref 70–99)
GLUCOSE UR QL: NEGATIVE MG/DL — SIGNIFICANT CHANGE UP
HCT VFR BLD CALC: 40.7 % — SIGNIFICANT CHANGE UP (ref 34.5–45)
HGB BLD-MCNC: 13.7 G/DL — SIGNIFICANT CHANGE UP (ref 11.5–15.5)
IMM GRANULOCYTES NFR BLD AUTO: 0.5 % — SIGNIFICANT CHANGE UP (ref 0–0.9)
KETONES UR-MCNC: ABNORMAL MG/DL
LEUKOCYTE ESTERASE UR-ACNC: NEGATIVE — SIGNIFICANT CHANGE UP
LIDOCAIN IGE QN: 24 U/L — SIGNIFICANT CHANGE UP (ref 13–75)
LYMPHOCYTES # BLD AUTO: 1.25 K/UL — SIGNIFICANT CHANGE UP (ref 1–3.3)
LYMPHOCYTES # BLD AUTO: 9.9 % — LOW (ref 13–44)
MCHC RBC-ENTMCNC: 31.1 PG — SIGNIFICANT CHANGE UP (ref 27–34)
MCHC RBC-ENTMCNC: 33.7 G/DL — SIGNIFICANT CHANGE UP (ref 32–36)
MCV RBC AUTO: 92.3 FL — SIGNIFICANT CHANGE UP (ref 80–100)
MONOCYTES # BLD AUTO: 0.39 K/UL — SIGNIFICANT CHANGE UP (ref 0–0.9)
MONOCYTES NFR BLD AUTO: 3.1 % — SIGNIFICANT CHANGE UP (ref 2–14)
NEUTROPHILS NFR BLD AUTO: 86 % — HIGH (ref 43–77)
NITRITE UR-MCNC: NEGATIVE — SIGNIFICANT CHANGE UP
NRBC BLD AUTO-RTO: 0 /100 WBCS — SIGNIFICANT CHANGE UP (ref 0–0)
PH UR: 6.5 — SIGNIFICANT CHANGE UP (ref 5–8)
PLATELET # BLD AUTO: 223 K/UL — SIGNIFICANT CHANGE UP (ref 150–400)
POTASSIUM SERPL-MCNC: 4.1 MMOL/L — SIGNIFICANT CHANGE UP (ref 3.5–5.3)
POTASSIUM SERPL-SCNC: 4.1 MMOL/L — SIGNIFICANT CHANGE UP (ref 3.5–5.3)
PROT SERPL-MCNC: 7.4 G/DL — SIGNIFICANT CHANGE UP (ref 6–8.3)
PROT UR-MCNC: NEGATIVE MG/DL — SIGNIFICANT CHANGE UP
RBC # BLD: 4.41 M/UL — SIGNIFICANT CHANGE UP (ref 3.8–5.2)
RBC # FLD: 13.5 % — SIGNIFICANT CHANGE UP (ref 10.3–14.5)
SODIUM SERPL-SCNC: 141 MMOL/L — SIGNIFICANT CHANGE UP (ref 135–145)
SP GR SPEC: 1.01 — SIGNIFICANT CHANGE UP (ref 1–1.03)
UROBILINOGEN FLD QL: 0.2 MG/DL — SIGNIFICANT CHANGE UP (ref 0.2–1)
WBC # BLD: 12.57 K/UL — HIGH (ref 3.8–10.5)
WBC # FLD AUTO: 12.57 K/UL — HIGH (ref 3.8–10.5)

## 2025-03-31 PROCEDURE — 74176 CT ABD & PELVIS W/O CONTRAST: CPT | Mod: 26

## 2025-03-31 PROCEDURE — 93005 ELECTROCARDIOGRAM TRACING: CPT

## 2025-03-31 PROCEDURE — 96375 TX/PRO/DX INJ NEW DRUG ADDON: CPT

## 2025-03-31 PROCEDURE — 74176 CT ABD & PELVIS W/O CONTRAST: CPT | Mod: MC

## 2025-03-31 PROCEDURE — 99285 EMERGENCY DEPT VISIT HI MDM: CPT | Mod: 25

## 2025-03-31 PROCEDURE — 80053 COMPREHEN METABOLIC PANEL: CPT

## 2025-03-31 PROCEDURE — 93010 ELECTROCARDIOGRAM REPORT: CPT

## 2025-03-31 PROCEDURE — 71045 X-RAY EXAM CHEST 1 VIEW: CPT | Mod: 26

## 2025-03-31 PROCEDURE — 71045 X-RAY EXAM CHEST 1 VIEW: CPT

## 2025-03-31 PROCEDURE — 99285 EMERGENCY DEPT VISIT HI MDM: CPT

## 2025-03-31 PROCEDURE — 96374 THER/PROPH/DIAG INJ IV PUSH: CPT

## 2025-03-31 PROCEDURE — 81003 URINALYSIS AUTO W/O SCOPE: CPT

## 2025-03-31 PROCEDURE — 85025 COMPLETE CBC W/AUTO DIFF WBC: CPT

## 2025-03-31 PROCEDURE — 83690 ASSAY OF LIPASE: CPT

## 2025-03-31 PROCEDURE — 36415 COLL VENOUS BLD VENIPUNCTURE: CPT

## 2025-03-31 RX ORDER — HYDROMORPHONE/SOD CHLOR,ISO/PF 2 MG/10 ML
0.5 SYRINGE (ML) INJECTION ONCE
Refills: 0 | Status: DISCONTINUED | OUTPATIENT
Start: 2025-03-31 | End: 2025-03-31

## 2025-03-31 RX ORDER — ONDANSETRON HCL/PF 4 MG/2 ML
4 VIAL (ML) INJECTION ONCE
Refills: 0 | Status: COMPLETED | OUTPATIENT
Start: 2025-03-31 | End: 2025-03-31

## 2025-03-31 RX ORDER — KETOROLAC TROMETHAMINE 30 MG/ML
15 INJECTION, SOLUTION INTRAMUSCULAR; INTRAVENOUS ONCE
Refills: 0 | Status: DISCONTINUED | OUTPATIENT
Start: 2025-03-31 | End: 2025-03-31

## 2025-03-31 RX ADMIN — Medication 0.5 MILLIGRAM(S): at 12:24

## 2025-03-31 RX ADMIN — Medication 4 MILLIGRAM(S): at 10:47

## 2025-03-31 RX ADMIN — KETOROLAC TROMETHAMINE 15 MILLIGRAM(S): 30 INJECTION, SOLUTION INTRAMUSCULAR; INTRAVENOUS at 12:50

## 2025-03-31 RX ADMIN — Medication 0.5 MILLIGRAM(S): at 10:47

## 2025-03-31 RX ADMIN — Medication 1000 MILLILITER(S): at 10:47

## 2025-03-31 NOTE — ED PROVIDER NOTE - PATIENT PORTAL LINK FT
You can access the FollowMyHealth Patient Portal offered by [facility] by registering at the following website: [email or web address]. By joining [facility]’s FollowMyHealth portal, you will also be able to view your health information using other applications (apps) compatible with our system.

## 2025-03-31 NOTE — ED ADULT NURSE NOTE - OBJECTIVE STATEMENT
pt to er c/o ab pain with nausea iv started 20 angio right arm labs drawn iv infusing no active vomiting in er family at bedside nursing care ongoing

## 2025-03-31 NOTE — ED PROVIDER NOTE - CLINICAL SUMMARY MEDICAL DECISION MAKING FREE TEXT BOX
Here with nausea, vomiting, lower abdominal pain. differential diagnosis inclusive of colitis, diverticulitis, enteritis, kidney stone, uti, pyelonephritis. check labs, CT, treat symptoms as needed, re-evaluate.

## 2025-03-31 NOTE — ED PROVIDER NOTE - CARE PROVIDER_API CALL
Magen Chandler.  Gastroenterology  [address] [place] [id number]  Phone: [phone]  Fax: [phone]  Follow Up Time: 1-3 Days

## 2025-03-31 NOTE — ED PROVIDER NOTE - PROGRESS NOTE DETAILS
Patient with improved symptoms.  Results discussed with patient and  at bedside.  Will discharge with GI follow-up.

## 2025-03-31 NOTE — ED PROVIDER NOTE - NSFOLLOWUPINSTRUCTIONS_ED_ALL_ED_FT
Please follow up with your Primary Care Physician and any specialists as discussed- follow up with your Gastroenterologists and Urologist.  Please take your medications as prescribed and or instructed.  If your symptoms persist or worsen, please seek care. Either return to the Emergency Department, go to urgent care or see your primary care doctor.  Please refer to general information and instructions attached or below:   Acute Abdominal Pain    WHAT YOU NEED TO KNOW:    The cause of your abdominal pain may not be found. If a cause is found, treatment will depend on what the cause is.     DISCHARGE INSTRUCTIONS:    Return to the emergency department if:     You vomit blood or cannot stop vomiting.      You have blood in your bowel movement or it looks like tar.       You have bleeding from your rectum.       Your abdomen is larger than usual, more painful, and hard.       You have severe pain in your abdomen.       You stop passing gas and having bowel movements.       You feel weak, dizzy, or faint.    Contact your healthcare provider if:     You have a fever.      You have new signs and symptoms.      Your symptoms do not get better with treatment.       You have questions or concerns about your condition or care.    Medicines may be given to decrease pain, treat an infection, and manage your symptoms. Take your medicine as directed. Call your healthcare provider if you think your medicine is not helping or if you have side effects. Tell him if you are allergic to any medicine. Keep a list of the medicines, vitamins, and herbs you take. Include the amounts, and when and why you take them. Bring the list or the pill bottles to follow-up visits. Carry your medicine list with you in case of an emergency.    Manage your symptoms:     Apply heat on your abdomen for 20 to 30 minutes every 2 hours for as many days as directed. Heat helps decrease pain and muscle spasms.       Manage your stress. Stress may cause abdominal pain. Your healthcare provider may recommend relaxation techniques and deep breathing exercises to help decrease your stress. Your healthcare provider may recommend you talk to someone about your stress or anxiety, such as a counselor or a trusted friend. Get plenty of sleep and exercise regularly.       Limit or do not drink alcohol. Alcohol can make your abdominal pain worse. Ask your healthcare provider if it is safe for you to drink alcohol. Also ask how much is safe for you to drink.       Do not smoke. Nicotine and other chemicals in cigarettes can damage your esophagus and stomach. Ask your healthcare provider for information if you currently smoke and need help to quit. E-cigarettes or smokeless tobacco still contain nicotine. Talk to your healthcare provider before you use these products.     Make changes to the food you eat as directed: Do not eat foods that cause abdominal pain or other symptoms. Eat small meals more often.     Eat more high-fiber foods if you are constipated. High-fiber foods include fruits, vegetables, whole-grain foods, and legumes.       Do not eat foods that cause gas if you have bloating. Examples include broccoli, cabbage, and cauliflower. Do not drink soda or carbonated drinks, because these may also cause gas.       Do not eat foods or drinks that contain sorbitol or fructose if you have diarrhea and bloating. Some examples are fruit juices, candy, jelly, and sugar-free gum.       Do not eat high-fat foods, such as fried foods, cheeseburgers, hot dogs, and desserts.      Limit or do not drink caffeine. Caffeine may make symptoms, such as heart burn or nausea, worse.       Drink plenty of liquids to prevent dehydration from diarrhea or vomiting. Ask your healthcare provider how much liquid to drink each day and which liquids are best for you.

## 2025-03-31 NOTE — ED PROVIDER NOTE - OBJECTIVE STATEMENT
68 yo F history of kidney stones, diverticulitis here complaining of abdominal pain with associated nausea, vomiting. patient reports lower abdominal cramping started last night then this morning developed nausea, vomiting. patient reports she was vomiting bile. patient reports soft stools. no fevers, chills. patient also reports she had some chest pain on arrival that has resolved.

## 2025-03-31 NOTE — ED PROVIDER NOTE - PHYSICAL EXAMINATION
2019 12:11
Vital signs as available reviewed.  General:  appears uncomfortable.  Head:  Normocephalic, atraumatic.  Eyes:  Conjunctiva pink, no icterus.  Cardiovascular:  Regular rate, no obvious murmur.  Respiratory:  Clear to auscultation, good air entry bilaterally.  Abdomen:  Soft, + right sided abdominal pain worse in RLQ.  Musculoskeletal:  No obvious deformity.  Neurologic: Alert and oriented, moving all extremities.  Skin:  Warm and dry.

## 2025-04-04 DIAGNOSIS — N28.89 OTHER SPECIFIED DISORDERS OF KIDNEY AND URETER: ICD-10-CM

## 2025-04-08 ENCOUNTER — APPOINTMENT (OUTPATIENT)
Dept: FAMILY MEDICINE | Facility: CLINIC | Age: 70
End: 2025-04-08
Payer: MEDICARE

## 2025-04-08 VITALS
SYSTOLIC BLOOD PRESSURE: 124 MMHG | BODY MASS INDEX: 29.06 KG/M2 | WEIGHT: 148 LBS | DIASTOLIC BLOOD PRESSURE: 80 MMHG | OXYGEN SATURATION: 98 % | HEART RATE: 66 BPM | HEIGHT: 60 IN

## 2025-04-08 DIAGNOSIS — R30.0 DYSURIA: ICD-10-CM

## 2025-04-08 DIAGNOSIS — N28.9 DISORDER OF KIDNEY AND URETER, UNSPECIFIED: ICD-10-CM

## 2025-04-08 DIAGNOSIS — F41.9 ANXIETY DISORDER, UNSPECIFIED: ICD-10-CM

## 2025-04-08 DIAGNOSIS — R73.9 HYPERGLYCEMIA, UNSPECIFIED: ICD-10-CM

## 2025-04-08 DIAGNOSIS — E78.5 HYPERLIPIDEMIA, UNSPECIFIED: ICD-10-CM

## 2025-04-08 DIAGNOSIS — E03.9 HYPOTHYROIDISM, UNSPECIFIED: ICD-10-CM

## 2025-04-08 PROCEDURE — 99214 OFFICE O/P EST MOD 30 MIN: CPT

## 2025-04-10 ENCOUNTER — RX RENEWAL (OUTPATIENT)
Age: 70
End: 2025-04-10

## 2025-04-14 PROBLEM — R30.0 DYSURIA: Status: ACTIVE | Noted: 2020-05-26

## 2025-04-14 RX ORDER — NITROFURANTOIN (MONOHYDRATE/MACROCRYSTALS) 25; 75 MG/1; MG/1
100 CAPSULE ORAL
Qty: 10 | Refills: 0 | Status: ACTIVE | COMMUNITY
Start: 2025-04-14 | End: 1900-01-01

## 2025-04-25 ENCOUNTER — APPOINTMENT (OUTPATIENT)
Dept: PODIATRY | Facility: CLINIC | Age: 70
End: 2025-04-25

## 2025-04-25 DIAGNOSIS — L60.3 NAIL DYSTROPHY: ICD-10-CM

## 2025-04-25 DIAGNOSIS — F19.91 OTHER PSYCHOACTIVE SUBSTANCE USE, UNSPECIFIED, IN REMISSION: ICD-10-CM

## 2025-04-25 DIAGNOSIS — I70.203 UNSPECIFIED ATHEROSCLEROSIS OF NATIVE ARTERIES OF EXTREMITIES, BILATERAL LEGS: ICD-10-CM

## 2025-04-25 DIAGNOSIS — M79.676 PAIN IN UNSPECIFIED TOE(S): ICD-10-CM

## 2025-04-25 PROCEDURE — 99202 OFFICE O/P NEW SF 15 MIN: CPT | Mod: 25

## 2025-04-25 PROCEDURE — 11056 PARNG/CUTG B9 HYPRKR LES 2-4: CPT | Mod: 59,Q9

## 2025-04-25 PROCEDURE — 11755 BIOPSY NAIL UNIT: CPT | Mod: 59,TA

## 2025-05-03 PROBLEM — L60.3 DYSTROPHIC NAIL: Status: ACTIVE | Noted: 2025-05-03

## 2025-05-03 PROBLEM — M79.676 TOE PAIN: Status: ACTIVE | Noted: 2025-05-03

## 2025-05-03 PROBLEM — I70.203 ATHEROSCLEROSIS OF NATIVE ARTERY OF BOTH LOWER EXTREMITIES, WITH UNSPECIFIED PRESENCE OF CLINICAL MANIFESTATION: Status: ACTIVE | Noted: 2025-05-03

## 2025-05-30 ENCOUNTER — APPOINTMENT (OUTPATIENT)
Dept: PODIATRY | Facility: CLINIC | Age: 70
End: 2025-05-30

## 2025-06-02 ENCOUNTER — APPOINTMENT (OUTPATIENT)
Dept: PODIATRY | Facility: CLINIC | Age: 70
End: 2025-06-02
Payer: MEDICARE

## 2025-06-02 DIAGNOSIS — B35.1 TINEA UNGUIUM: ICD-10-CM

## 2025-06-02 PROCEDURE — 99212 OFFICE O/P EST SF 10 MIN: CPT

## 2025-06-03 PROBLEM — B35.1 TINEA UNGUIUM: Status: ACTIVE | Noted: 2025-06-03

## 2025-06-03 RX ORDER — DOXYCYCLINE 100 MG/1
100 CAPSULE ORAL
Qty: 14 | Refills: 0 | Status: COMPLETED | COMMUNITY
Start: 2025-06-03 | End: 2025-06-10

## 2025-06-04 ENCOUNTER — OFFICE (OUTPATIENT)
Dept: URBAN - METROPOLITAN AREA CLINIC 109 | Facility: CLINIC | Age: 70
Setting detail: OPHTHALMOLOGY
End: 2025-06-04
Payer: MEDICARE

## 2025-06-04 DIAGNOSIS — H25.13: ICD-10-CM

## 2025-06-04 DIAGNOSIS — H40.013: ICD-10-CM

## 2025-06-04 DIAGNOSIS — H16.223: ICD-10-CM

## 2025-06-04 PROCEDURE — 92012 INTRM OPH EXAM EST PATIENT: CPT | Performed by: OPHTHALMOLOGY

## 2025-06-04 ASSESSMENT — KERATOMETRY
OD_K1POWER_DIOPTERS: 43.00
OD_K2POWER_DIOPTERS: 43.75
OS_K2POWER_DIOPTERS: 43.75
OS_K1POWER_DIOPTERS: 43.00
OS_AXISANGLE_DEGREES: 104
OD_AXISANGLE_DEGREES: 077

## 2025-06-04 ASSESSMENT — REFRACTION_CURRENTRX
OS_OVR_VA: 20/
OS_SPHERE: +2.00
OD_SPHERE: +2.00
OS_ADD: +2.00
OD_ADD: +2.50
OD_AXIS: 157
OS_AXIS: 85
OS_CYLINDER: -0.50
OD_OVR_VA: 20/
OD_VPRISM_DIRECTION: PROGS
OD_SPHERE: +2.00
OS_AXIS: 025
OS_VPRISM_DIRECTION: PROGS
OS_SPHERE: +2.50
OS_AXIS: 33
OS_ADD: +2.00
OD_CYLINDER: -0.75
OS_CYLINDER: -0.25
OD_ADD: +2.00
OD_SPHERE: +2.25
OD_AXIS: 126
OS_SPHERE: +2.50
OD_CYLINDER: -0.50
OD_VPRISM_DIRECTION: PROGS
OD_ADD: +2.50
OS_VPRISM_DIRECTION: PROGS
OS_OVR_VA: 20/
OS_CYLINDER: -0.25
OS_ADD: +2.50
OD_AXIS: 150
OD_CYLINDER: -0.50
OS_OVR_VA: 20/
OD_OVR_VA: 20/
OD_OVR_VA: 20/

## 2025-06-04 ASSESSMENT — REFRACTION_MANIFEST
OS_SPHERE: +2.50
OD_CYLINDER: -0.50
OD_AXIS: 140
OS_VA1: 20/20
OD_SPHERE: +2.50
OD_VA1: 20/20
OU_VA: 20/20
OD_ADD: +2.50
OS_CYLINDER: -0.50
OS_AXIS: 075
OS_ADD: +2.50

## 2025-06-04 ASSESSMENT — REFRACTION_AUTOREFRACTION
OS_AXIS: 074
OS_SPHERE: +2.25
OD_CYLINDER: -0.75
OD_SPHERE: +2.50
OD_AXIS: 139
OS_CYLINDER: -0.50

## 2025-06-04 ASSESSMENT — PACHYMETRY
OD_CT_CORRECTION: -4
OS_CT_UM: 595
OS_CT_CORRECTION: -4
OD_CT_UM: 590

## 2025-06-04 ASSESSMENT — DECREASING TEAR LAKE - SEVERITY SCORE
OS_DEC_TEARLAKE: T
OD_DEC_TEARLAKE: T

## 2025-06-04 ASSESSMENT — VISUAL ACUITY
OD_BCVA: 20/30-2
OS_BCVA: 20/20-2

## 2025-06-04 ASSESSMENT — CONFRONTATIONAL VISUAL FIELD TEST (CVF)
OD_FINDINGS: FULL
OS_FINDINGS: FULL

## 2025-06-04 ASSESSMENT — SUPERFICIAL PUNCTATE KERATITIS (SPK)
OS_SPK: T
OD_SPK: T

## 2025-06-05 ENCOUNTER — NON-APPOINTMENT (OUTPATIENT)
Age: 70
End: 2025-06-05

## 2025-07-02 ENCOUNTER — APPOINTMENT (OUTPATIENT)
Dept: PODIATRY | Facility: CLINIC | Age: 70
End: 2025-07-02

## 2025-07-11 ENCOUNTER — APPOINTMENT (OUTPATIENT)
Dept: FAMILY MEDICINE | Facility: CLINIC | Age: 70
End: 2025-07-11

## 2025-07-11 VITALS
HEART RATE: 65 BPM | OXYGEN SATURATION: 98 % | SYSTOLIC BLOOD PRESSURE: 102 MMHG | WEIGHT: 148 LBS | BODY MASS INDEX: 29.06 KG/M2 | DIASTOLIC BLOOD PRESSURE: 76 MMHG | HEIGHT: 60 IN

## 2025-07-11 PROBLEM — R39.89 BLADDER PAIN: Status: ACTIVE | Noted: 2025-07-11

## 2025-07-11 PROBLEM — G62.9 PERIPHERAL NEUROPATHY: Status: ACTIVE | Noted: 2025-07-11

## 2025-07-11 PROBLEM — N94.10 DYSPAREUNIA, FEMALE: Status: ACTIVE | Noted: 2025-07-11

## 2025-07-11 PROBLEM — M79.2 NEURALGIA: Status: ACTIVE | Noted: 2025-07-11

## 2025-07-11 PROCEDURE — 99214 OFFICE O/P EST MOD 30 MIN: CPT

## 2025-07-24 ENCOUNTER — APPOINTMENT (OUTPATIENT)
Dept: ORTHOPEDIC SURGERY | Facility: CLINIC | Age: 70
End: 2025-07-24

## 2025-07-24 ENCOUNTER — NON-APPOINTMENT (OUTPATIENT)
Age: 70
End: 2025-07-24

## 2025-07-29 ENCOUNTER — RX RENEWAL (OUTPATIENT)
Age: 70
End: 2025-07-29

## 2025-08-27 ENCOUNTER — APPOINTMENT (OUTPATIENT)
Dept: OBGYN | Facility: CLINIC | Age: 70
End: 2025-08-27
Payer: MEDICARE

## 2025-08-27 VITALS
SYSTOLIC BLOOD PRESSURE: 118 MMHG | WEIGHT: 143.31 LBS | DIASTOLIC BLOOD PRESSURE: 70 MMHG | HEIGHT: 60 IN | BODY MASS INDEX: 28.13 KG/M2

## 2025-08-27 DIAGNOSIS — Z87.898 PERSONAL HISTORY OF OTHER SPECIFIED CONDITIONS: ICD-10-CM

## 2025-08-27 DIAGNOSIS — L29.2 PRURITUS VULVAE: ICD-10-CM

## 2025-08-27 DIAGNOSIS — Z87.42 PERSONAL HISTORY OF OTHER DISEASES OF THE FEMALE GENITAL TRACT: ICD-10-CM

## 2025-08-27 DIAGNOSIS — N94.89 OTHER SPECIFIED CONDITIONS ASSOCIATED WITH FEMALE GENITAL ORGANS AND MENSTRUAL CYCLE: ICD-10-CM

## 2025-08-27 DIAGNOSIS — R39.15 URGENCY OF URINATION: ICD-10-CM

## 2025-08-27 DIAGNOSIS — N89.8 OTHER SPECIFIED NONINFLAMMATORY DISORDERS OF VAGINA: ICD-10-CM

## 2025-08-27 LAB
APPEARANCE: CLEAR
BILIRUBIN URINE: NEGATIVE
BLOOD URINE: NEGATIVE
COLOR: YELLOW
GLUCOSE QUALITATIVE U: NEGATIVE
KETONES URINE: NEGATIVE
LEUKOCYTE ESTERASE URINE: NEGATIVE
NITRITE URINE: NEGATIVE
PH URINE: 6.5
PROTEIN URINE: NEGATIVE
SPECIFIC GRAVITY URINE: 1.02
UROBILINOGEN URINE: 0.2 (ref 0.2–?)

## 2025-08-27 PROCEDURE — 99213 OFFICE O/P EST LOW 20 MIN: CPT

## 2025-08-27 RX ORDER — CLOTRIMAZOLE AND BETAMETHASONE DIPROPIONATE 10; .5 MG/G; MG/G
1-0.05 CREAM TOPICAL TWICE DAILY
Qty: 1 | Refills: 2 | Status: ACTIVE | COMMUNITY
Start: 2025-08-27 | End: 1900-01-01

## 2025-08-27 RX ORDER — TERCONAZOLE 8 MG/G
0.8 CREAM VAGINAL
Qty: 1 | Refills: 3 | Status: ACTIVE | COMMUNITY
Start: 2025-08-27 | End: 1900-01-01

## 2025-08-28 PROBLEM — Z87.898 HISTORY OF DYSURIA: Status: RESOLVED | Noted: 2020-05-26 | Resolved: 2025-08-28

## 2025-08-28 PROBLEM — Z87.898 HISTORY OF URINARY INCONTINENCE: Status: RESOLVED | Noted: 2019-09-13 | Resolved: 2025-08-28

## 2025-09-02 ENCOUNTER — TRANSCRIPTION ENCOUNTER (OUTPATIENT)
Age: 70
End: 2025-09-02

## 2025-09-03 RX ORDER — NITROFURANTOIN (MONOHYDRATE/MACROCRYSTALS) 25; 75 MG/1; MG/1
100 CAPSULE ORAL
Qty: 14 | Refills: 0 | Status: ACTIVE | COMMUNITY
Start: 2025-09-02